# Patient Record
Sex: FEMALE | Race: BLACK OR AFRICAN AMERICAN | NOT HISPANIC OR LATINO | Employment: OTHER | URBAN - METROPOLITAN AREA
[De-identification: names, ages, dates, MRNs, and addresses within clinical notes are randomized per-mention and may not be internally consistent; named-entity substitution may affect disease eponyms.]

---

## 2017-01-06 ENCOUNTER — OFFICE VISIT (OUTPATIENT)
Dept: FAMILY MEDICINE | Facility: CLINIC | Age: 77
End: 2017-01-06

## 2017-01-06 VITALS
OXYGEN SATURATION: 97 % | DIASTOLIC BLOOD PRESSURE: 68 MMHG | HEART RATE: 84 BPM | BODY MASS INDEX: 25.52 KG/M2 | RESPIRATION RATE: 18 BRPM | WEIGHT: 135 LBS | SYSTOLIC BLOOD PRESSURE: 110 MMHG | TEMPERATURE: 98.1 F

## 2017-01-06 DIAGNOSIS — R42 DIZZINESS: Primary | ICD-10-CM

## 2017-01-06 DIAGNOSIS — M54.16 CHRONIC LUMBAR RADICULOPATHY: ICD-10-CM

## 2017-01-06 NOTE — PROGRESS NOTES
HPI:       Lou Winston is a 77 year old who presents for the following  Patient presents with:  Musculoskeletal Problem      Back Pain Follow Up       Description:   Location of pain:  Lumbar,  bilateral  Character of pain: sharp  Pain radiation: Does not radiate and radiates below the knee   Since last visit, pain is:  worsened  New numbness or weakness in legs, not attributed to pain:  Yes Details: lower extremity    Intensity: Currently 9/10,    History:   Pain interferes with job: Yes Details: cannot do anything at home.  Therapies tried without relief: muscle relaxants, NSAIDs, Physical Therapy and steroid injection  Therapies tried with relief: acetaminophen (Tylenol) 1000 mg TID     Accompanying Signs & Symptoms:  Risk of Fracture:  None  Risk of Cauda Equina:  None  Risk of Infection:  None  Risk of Cancer:  None             Dizziness: Report she has been dealing with dizziness for the past one month. Notices dizziness worse with standing and walking. Describe the dizziness as feeling blackout. Endorse palpitation. Not sure about the names of her medications. Per our medical records, patient is on minipress 6 mg at bedtime.     Problem, Medication and Allergy Lists were reviewed and are current.  Patient is an established patient of this clinic.         Review of Systems:   Review of Systems   Constitutional: Negative for fever and fatigue.   Respiratory: Negative for cough and shortness of breath.    Cardiovascular: Negative for chest pain, palpitations and leg swelling.   Musculoskeletal: Positive for back pain (chronic ).   Neurological: Positive for dizziness.             Physical Exam:   Patient Vitals for the past 24 hrs:   BP Temp Temp src Pulse Resp SpO2 Weight   01/06/17 1526 110/68 mmHg 98.1  F (36.7  C) Oral 84 18 97 % 135 lb (61.236 kg)     Body mass index is 25.52 kg/(m^2).  Vitals were reviewed and were normal     Physical Exam   Constitutional: She is oriented to person, place, and  time. She appears well-developed and well-nourished. No distress.   HENT:   Head: Normocephalic and atraumatic.   Cardiovascular: Normal rate, regular rhythm and normal heart sounds.    No murmur heard.  Pulmonary/Chest: Effort normal and breath sounds normal. No respiratory distress. She has no wheezes. She has no rales.   Musculoskeletal:        Lumbar back: She exhibits decreased range of motion and tenderness. She exhibits no bony tenderness, no swelling, no edema and no deformity.   Neurological: She is alert and oriented to person, place, and time.       Results:      Results from the last 24 hoursNo results found for this or any previous visit (from the past 24 hour(s)).  Assessment and Plan     Lou was seen today for musculoskeletal problem.    Diagnoses and all orders for this visit:    Dizziness: EKG showed normal sinus and normal orthostatic BP.   -Reviewed her medications list, but patient is does not know the names of medications she is taking. Discontinue prazosin and advised to schedule an appointment with one week for medication reconciliation and encouraged to bring all her medication pills with her to clinic   -Reassurance provided    -     EKG 12-lead complete w/read - Clinics  -     Orthostatic blood pressure and pulse    Chronic lumbar radiculopathy: Patient had MRI done in 2009 which showed disc herniation of L4-L5.  Failed multiple steroid injections and physical therapy. She was seen by surgeon and was told that she is not a good candidate for surgery.   -Continue scheduled tylenol 1000 mg TID   -Diclofenac cream   - Consider discontinue Zoloft and switching to Cymbalta since it will help with the pain. I am waiting until patient has an appointment with me or Pharm D for medication reconciliation   -     diclofenac (VOLTAREN) 1 % GEL topical gel; Apply 4 grams to lower back four times daily using enclosed dosing card.      Medications Discontinued During This Encounter   Medication Reason      prazosin (MINIPRESS) 2 MG capsule Stopped by Patient     diclofenac (VOLTAREN) 1 % GEL Reorder     Options for treatment and follow-up care were reviewed with the patient. Lou Winston  engaged in the decision making process and verbalized understanding of the options discussed and agreed with the final plan.    Tony Alarcon MD  PGY 3 General acute hospital  [phone](pg)

## 2017-01-06 NOTE — PROGRESS NOTES
Preceptor Attestation:   Patient seen and discussed with the resident. Reviewed EKG findings and agree with resident.  Assessment and plan reviewed with resident and agreed upon.   Supervising Physician:  Yonny Ibanez MD  [facility]

## 2017-01-06 NOTE — MR AVS SNAPSHOT
After Visit Summary   1/6/2017    Lou Winston    MRN: [id number]           Patient Information     Date Of Birth          1940        Visit Information        Provider Department      1/6/2017 3:20 PM Tony Alarcon MD [facility]        Today's Diagnoses     Chronic lumbar radiculopathy    -  1     Dizziness           Care Instructions    Here is the plan from today's visit    1. Chronic lumbar radiculopathy  -Schedule an appointment with either Dr. Alarcon or Pharm D for medication recon  -Continue scheduled tylenol and scheduled diclofenac cream   - diclofenac (VOLTAREN) 1 % GEL topical gel; Apply 4 grams to lower back four times daily using enclosed dosing card.  Dispense: 100 g; Refill: 11    2. Dizziness: Normal orthostatic BP pressure and EKG unremarkable  -Discontinue prazosin for now   - EKG 12-lead complete w/read - Clinics      Please call or return to clinic if your symptoms don't go away.    Follow up plan  Follow up in one week    Thank you for coming to [facility] today.  Lab Testing:  **If you had lab testing today and your results are reassuring or normal they will be mailed to you or sent through Raiing within 7 days.   **If the lab tests need quick action we will call you with the results.  The phone number we will call with results is # [phone] (home) . If this is not the best number please call our clinic and change the number.  Medication Refills:  If you need any refills please call your pharmacy and they will contact us.   If you need to  your refill at a new pharmacy, please contact the new pharmacy directly. The new pharmacy will help you get your medications transferred faster.   Scheduling:  If you have any concerns about today's visit or wish to schedule another appointment please call our office during normal business hours [phone] (8-5:00 M-F)  If a referral was made to a [facility] and you don't get  a call from central scheduling please call [phone].  If a Mammogram was ordered for you at [facility] call [phone] to schedule or change your appointment.  If you had an XRay/CT/Ultrasound/MRI ordered the number is [phone] to schedule or change your radiology appointment.   Medical Concerns:  If you have urgent medical concerns please call [phone] at any time of the day.  If you have a medical emergency please call [phone].              Follow-ups after your visit        Who to contact     Please call your clinic at [phone] to:    Ask questions about your health    Make or cancel appointments    Discuss your medicines    Learn about your test results    Speak to your doctor   If you have compliments or concerns about an experience at your clinic, or if you wish to file a complaint, please contact [facility] Patient Relations at [phone] or email us at [email or web address]         Additional Information About Your Visit        Sookbox[employer] Rocket Information     Halton is an electronic gateway that provides easy, online access to your medical records. With Halton, you can request a clinic appointment, read your test results, renew a prescription or communicate with your care team.     To sign up for Halton visit the website at [email or web address]   You will be asked to enter the access code listed below, as well as some personal information. Please follow the directions to create your username and password.     Your access code is: [email or web address]  Expires: 2017  4:14 PM     Your access code will  in 90 days. If you need help or a new code, please contact your [facility] or call [phone] for assistance.        Care EveryWhere ID     This is your Care EveryWhere ID. This could be used by other organizations to access your [place] medical records  [id number]        Your Vitals Were     Pulse Temperature  Respirations Pulse Oximetry Breastfeeding?       84 98.1  F (36.7  C) (Oral) 18 97% No        Blood Pressure from Last 3 Encounters:   01/06/17 110/68   07/21/16 117/77   06/24/16 116/79    Weight from Last 3 Encounters:   01/06/17 135 lb (61.236 kg)   07/21/16 131 lb 12.8 oz (59.784 kg)   06/24/16 136 lb 6.4 oz (61.871 kg)              We Performed the Following     EKG 12-lead complete w/read - Clinics     Orthostatic blood pressure and pulse          Where to get your medicines      These medications were sent to [facility] [place], [place] - [address]  [address] [facility] [address]     Phone:  [phone]    - diclofenac 1 % Gel topical gel       Primary Care Provider Office Phone # Fax #    Tony Alarcon [phone]       [facility] [address] [facility] [address]        Thank you!     Thank you for choosing [facility]  for your care. Our goal is always to provide you with excellent care. Hearing back from our patients is one way we can continue to improve our services. Please take a few minutes to complete the written survey that you may receive in the mail after your visit with us. Thank you!             Your Updated Medication List - Protect others around you: Learn how to safely use, store and throw away your medicines at www.disposemymeds.org.          This list is accurate as of: 1/6/17  4:14 PM.  Always use your most recent med list.                   Brand Name Dispense Instructions for use    * acetaminophen 650 MG CR tablet    TYLENOL    100 tablet    Take 1 tablet (650 mg) by mouth 3 times daily       * acetaminophen 500 MG tablet    TYLENOL    100 tablet    Take 2 tablets (1,000 mg) by mouth 3 times daily as needed for mild pain       * acetaminophen 500 MG tablet    TYLENOL    100 tablet    Take 2 tablets (1,000 mg) by mouth every 8 hours as needed for mild pain       alum & mag hydroxide-simethicone 200-200-20  MG/5ML Susp suspension    MYLANTA/MAALOX    1 Bottle    Take 30 mLs by mouth every 4 hours as needed       busPIRone 15 MG tablet    BUSPAR    270 tablet    Take 1 tablet (15 mg) by mouth 3 times daily       * calcium carbonate 500 MG chewable tablet    TUMS    150 tablet    Take 1 tablet (500 mg) by mouth 3 times daily as needed for heartburn       * calcium carbonate 500 MG chewable tablet    TUMS    120 tablet    Take 1 tablet (500 mg) by mouth daily       cyclobenzaprine 10 MG tablet    FLEXERIL    14 tablet    Take 1 tablet (10 mg) by mouth nightly as needed for muscle spasms       diclofenac 1 % Gel topical gel    VOLTAREN    100 g    Apply 4 grams to lower back four times daily using enclosed dosing card.       gabapentin 100 MG capsule    NEURONTIN    180 capsule    TAKE TWO CAPSULES BY MOUTH AT BEDTIME       pantoprazole 40 MG EC tablet    PROTONIX    30 tablet    TAKE ONE TABLET BY MOUTH EVERY DAY 30-60 MINUTES BEFORE A MEAL       senna-docusate 8.6-50 MG per tablet    SENOKOT-S;PERICOLACE    60 tablet    Take 1 tablet by mouth 2 times daily as needed for constipation       sertraline 100 MG tablet    ZOLOFT    180 tablet    Take 2 tablets (200 mg) by mouth daily       UNKNOWN TO PATIENT          * Notice:  This list has 5 medication(s) that are the same as other medications prescribed for you. Read the directions carefully, and ask your doctor or other care provider to review them with you.

## 2017-01-06 NOTE — PATIENT INSTRUCTIONS
Here is the plan from today's visit    1. Chronic lumbar radiculopathy  -Schedule an appointment with either Dr. Alarcon or Pharm D for medication recon  -Continue scheduled tylenol and scheduled diclofenac cream   - diclofenac (VOLTAREN) 1 % GEL topical gel; Apply 4 grams to lower back four times daily using enclosed dosing card.  Dispense: 100 g; Refill: 11    2. Dizziness: Normal orthostatic BP pressure and EKG unremarkable  -Discontinue prazosin for now   - EKG 12-lead complete w/read - Clinics      Please call or return to clinic if your symptoms don't go away.    Follow up plan  Follow up in one week    Thank you for coming to [facility] today.  Lab Testing:  **If you had lab testing today and your results are reassuring or normal they will be mailed to you or sent through EventMama within 7 days.   **If the lab tests need quick action we will call you with the results.  The phone number we will call with results is # [phone] (home) . If this is not the best number please call our clinic and change the number.  Medication Refills:  If you need any refills please call your pharmacy and they will contact us.   If you need to  your refill at a new pharmacy, please contact the new pharmacy directly. The new pharmacy will help you get your medications transferred faster.   Scheduling:  If you have any concerns about today's visit or wish to schedule another appointment please call our office during normal business hours [phone] (8-5:00 M-F)  If a referral was made to a [facility] and you don't get a call from central scheduling please call [phone].  If a Mammogram was ordered for you at [facility] call [phone] to schedule or change your appointment.  If you had an XRay/CT/Ultrasound/MRI ordered the number is [phone] to schedule or change your radiology appointment.   Medical Concerns:  If you have urgent medical concerns please call [phone] at any  time of the day.  If you have a medical emergency please call 911.

## 2017-01-15 ASSESSMENT — ENCOUNTER SYMPTOMS
SHORTNESS OF BREATH: 0
FATIGUE: 0
BACK PAIN: 1
FEVER: 0
DIZZINESS: 1
PALPITATIONS: 0
COUGH: 0

## 2017-01-17 ENCOUNTER — OFFICE VISIT (OUTPATIENT)
Dept: FAMILY MEDICINE | Facility: CLINIC | Age: 77
End: 2017-01-17

## 2017-01-17 ENCOUNTER — TELEPHONE (OUTPATIENT)
Dept: FAMILY MEDICINE | Facility: CLINIC | Age: 77
End: 2017-01-17

## 2017-01-17 VITALS
BODY MASS INDEX: 25.71 KG/M2 | HEART RATE: 79 BPM | RESPIRATION RATE: 14 BRPM | SYSTOLIC BLOOD PRESSURE: 121 MMHG | WEIGHT: 136 LBS | TEMPERATURE: 98.1 F | DIASTOLIC BLOOD PRESSURE: 74 MMHG | OXYGEN SATURATION: 100 %

## 2017-01-17 DIAGNOSIS — M54.42 CHRONIC BILATERAL LOW BACK PAIN WITH LEFT-SIDED SCIATICA: ICD-10-CM

## 2017-01-17 DIAGNOSIS — Z00.00 PREVENTATIVE HEALTH CARE: ICD-10-CM

## 2017-01-17 DIAGNOSIS — F33.41 RECURRENT MAJOR DEPRESSIVE DISORDER, IN PARTIAL REMISSION (H): Primary | ICD-10-CM

## 2017-01-17 DIAGNOSIS — G89.29 CHRONIC BILATERAL LOW BACK PAIN WITH LEFT-SIDED SCIATICA: ICD-10-CM

## 2017-01-17 DIAGNOSIS — H10.10 SEASONAL ALLERGIC CONJUNCTIVITIS: ICD-10-CM

## 2017-01-17 DIAGNOSIS — H04.123 DRY EYES: ICD-10-CM

## 2017-01-17 RX ORDER — CARBOXYMETHYLCELLULOSE SODIUM 5 MG/ML
1 SOLUTION/ DROPS OPHTHALMIC 3 TIMES DAILY PRN
Qty: 1 BOTTLE | Refills: 3 | Status: SHIPPED | OUTPATIENT
Start: 2017-01-17 | End: 2017-09-29

## 2017-01-17 RX ORDER — DULOXETIN HYDROCHLORIDE 30 MG/1
CAPSULE, DELAYED RELEASE ORAL
Qty: 180 CAPSULE | Refills: 3 | Status: SHIPPED | OUTPATIENT
Start: 2017-01-17 | End: 2017-01-18

## 2017-01-17 NOTE — MR AVS SNAPSHOT
After Visit Summary   1/17/2017    Lou Winston    MRN: [id number]           Patient Information     Date Of Birth          1940        Visit Information        Provider Department      1/17/2017 3:40 PM Tony Alarcon MD [facility]        Today's Diagnoses     Recurrent major depressive disorder, in partial remission (H)    -  1     Preventative health care         Chronic bilateral low back pain with left-sided sciatica         Seasonal allergic conjunctivitis         Dry eyes           Care Instructions    Here is the plan from today's visit    1. Recurrent major depressive disorder, in partial remission (H)  - DULoxetine (CYMBALTA) 30 MG EC capsule; Take 1 tablet daily for one week then take 2 tablets daily  Dispense: 180 capsule; Refill: 3    2. Preventative health care  - ADMIN VACCINE, INITIAL  - ADMIN VACCINE, EACH ADDITIONAL  - PNEUMOCOCCAL CONJ VACCINE 13 VALENT IM (PCV13)  - Flu vaccine, high dose    3. Chronic bilateral low back pain with left-sided sciatica  - DULoxetine (CYMBALTA) 30 MG EC capsule; Take 1 tablet daily for one week then take 2 tablets daily  Dispense: 180 capsule; Refill: 3    4. Seasonal allergic conjunctivitis  - ketotifen (KP KETOTIFEN FUMARATE) 0.025 % SOLN ophthalmic solution; Place 1 drop into both eyes 2 times daily  Dispense: 1 Bottle; Refill: 3    5. Dry eyes  - carboxymethylcellulose (REFRESH PLUS) 0.5 % SOLN ophthalmic solution; Place 1 drop into both eyes 3 times daily as needed for dry eyes  Dispense: 1 Bottle; Refill: 3      Please call or return to clinic if your symptoms don't go away.    Follow up plan  Follow up in 3 months     Thank you for coming to [facility] today.  Lab Testing:  **If you had lab testing today and your results are reassuring or normal they will be mailed to you or sent through Inviragen within 7 days.   **If the lab tests need quick action we will call you with the results.  The phone number we will  call with results is # [phone] (home) . If this is not the best number please call our clinic and change the number.  Medication Refills:  If you need any refills please call your pharmacy and they will contact us.   If you need to  your refill at a new pharmacy, please contact the new pharmacy directly. The new pharmacy will help you get your medications transferred faster.   Scheduling:  If you have any concerns about today's visit or wish to schedule another appointment please call our office during normal business hours [phone] (8-5:00 M-F)  If a referral was made to a [facility] and you don't get a call from central scheduling please call [phone].  If a Mammogram was ordered for you at [facility] call [phone] to schedule or change your appointment.  If you had an XRay/CT/Ultrasound/MRI ordered the number is [phone] to schedule or change your radiology appointment.   Medical Concerns:  If you have urgent medical concerns please call [phone] at any time of the day.  If you have a medical emergency please call [phone].        Follow-ups after your visit        Who to contact     Please call your clinic at [phone] to:    Ask questions about your health    Make or cancel appointments    Discuss your medicines    Learn about your test results    Speak to your doctor   If you have compliments or concerns about an experience at your clinic, or if you wish to file a complaint, please contact [facility] Patient Relations at [phone] or email us at [email or web address]         Additional Information About Your Visit        [employer] Information     [employer] is an electronic gateway that provides easy, online access to your medical records. With [employer], you can request a clinic appointment, read your test results, renew a prescription or communicate with your care team.     To sign up for [employer] visit the website  at [email or web address]   You will be asked to enter the access code listed below, as well as some personal information. Please follow the directions to create your username and password.     Your access code is: [email or web address]  Expires: 2017  4:14 PM     Your access code will  in 90 days. If you need help or a new code, please contact your [facility] or call [phone] for assistance.        Care EveryWhere ID     This is your Care EveryWhere ID. This could be used by other organizations to access your [place] medical records  [id number]        Your Vitals Were     Pulse Temperature Respirations Pulse Oximetry          79 98.1  F (36.7  C) (Oral) 14 100%         Blood Pressure from Last 3 Encounters:   17 121/74   17 110/68   16 117/77    Weight from Last 3 Encounters:   17 136 lb (61.689 kg)   17 135 lb (61.236 kg)   16 131 lb 12.8 oz (59.784 kg)              We Performed the Following     ADMIN VACCINE, EACH ADDITIONAL     ADMIN VACCINE, INITIAL     Flu vaccine, high dose     PNEUMOCOCCAL CONJ VACCINE 13 VALENT IM (PCV13)          Today's Medication Changes          These changes are accurate as of: 17  4:03 PM.  If you have any questions, ask your nurse or doctor.               Start taking these medicines.        Dose/Directions    carboxymethylcellulose 0.5 % Soln ophthalmic solution   Commonly known as:  REFRESH PLUS   Used for:  Dry eyes   Started by:  Tony Alarcon MD        Dose:  1 drop   Place 1 drop into both eyes 3 times daily as needed for dry eyes   Quantity:  1 Bottle   Refills:  3       DULoxetine 30 MG EC capsule   Commonly known as:  CYMBALTA   Used for:  Recurrent major depressive disorder, in partial remission (H), Chronic bilateral low back pain with left-sided sciatica   Started by:  Tony Alarcon MD        Take 1 tablet daily for one week then take 2 tablets daily   Quantity:  180 capsule    Refills:  3       ketotifen 0.025 % Soln ophthalmic solution   Commonly known as:  KP KETOTIFEN FUMARATE   Used for:  Seasonal allergic conjunctivitis   Started by:  Tony Alarcon MD        Dose:  1 drop   Place 1 drop into both eyes 2 times daily   Quantity:  1 Bottle   Refills:  3            Where to get your medicines      These medications were sent to [facility] [place], [place] - [address]  [address] [facility] [address]     Phone:  [phone]    - carboxymethylcellulose 0.5 % Soln ophthalmic solution  - DULoxetine 30 MG EC capsule  - ketotifen 0.025 % Soln ophthalmic solution             Primary Care Provider Office Phone # Fax #    Tony Alarcon [phone]       [facility] [address] [facility] [address]        Thank you!     Thank you for choosing [facility]  for your care. Our goal is always to provide you with excellent care. Hearing back from our patients is one way we can continue to improve our services. Please take a few minutes to complete the written survey that you may receive in the mail after your visit with us. Thank you!             Your Updated Medication List - Protect others around you: Learn how to safely use, store and throw away your medicines at www.disposemymeds.org.          This list is accurate as of: 1/17/17  4:03 PM.  Always use your most recent med list.                   Brand Name Dispense Instructions for use    acetaminophen 500 MG tablet    TYLENOL    100 tablet    Take 2 tablets (1,000 mg) by mouth every 8 hours as needed for mild pain       carboxymethylcellulose 0.5 % Soln ophthalmic solution    REFRESH PLUS    1 Bottle    Place 1 drop into both eyes 3 times daily as needed for dry eyes       diclofenac 1 % Gel topical gel    VOLTAREN    100 g    Apply 4 grams to lower back four times daily using enclosed dosing card.       DULoxetine 30 MG EC capsule    CYMBALTA    180 capsule    Take  1 tablet daily for one week then take 2 tablets daily       gabapentin 100 MG capsule    NEURONTIN    180 capsule    TAKE TWO CAPSULES BY MOUTH AT BEDTIME       ketotifen 0.025 % Soln ophthalmic solution    KP KETOTIFEN FUMARATE    1 Bottle    Place 1 drop into both eyes 2 times daily       pantoprazole 40 MG EC tablet    PROTONIX    30 tablet    TAKE ONE TABLET BY MOUTH EVERY DAY 30-60 MINUTES BEFORE A MEAL       UNKNOWN TO PATIENT

## 2017-01-17 NOTE — TELEPHONE ENCOUNTER
Insurance allows QTY# 34 per fill. Please submit for Prior Authorization       PRIOR AUTHORIZATION FOR   Drug: Duloxetine 30mg Capsules  Insurance: [employer]  ID Number: [id number]  BIN Number: [id number]  N Number: N/A  Group Number: [id number]  Phone Number: [phone]    Please notify pharmacy if Prior Auth is approved or denied    Thank You  Cha Franklin CPhT, [facility] Technician  [phone]  [email or web address]

## 2017-01-17 NOTE — PROGRESS NOTES
Preceptor Attestation:   Patient seen and discussed with the resident. Assessment and plan reviewed with resident and agreed upon.   Supervising Physician:  Tiffany Cabrera MD  [facility]

## 2017-01-17 NOTE — PATIENT INSTRUCTIONS
Here is the plan from today's visit    1. Recurrent major depressive disorder, in partial remission (H)  - DULoxetine (CYMBALTA) 30 MG EC capsule; Take 1 tablet daily for one week then take 2 tablets daily  Dispense: 180 capsule; Refill: 3    2. Preventative health care  - ADMIN VACCINE, INITIAL  - ADMIN VACCINE, EACH ADDITIONAL  - PNEUMOCOCCAL CONJ VACCINE 13 VALENT IM (PCV13)  - Flu vaccine, high dose    3. Chronic bilateral low back pain with left-sided sciatica  - DULoxetine (CYMBALTA) 30 MG EC capsule; Take 1 tablet daily for one week then take 2 tablets daily  Dispense: 180 capsule; Refill: 3    4. Seasonal allergic conjunctivitis  - ketotifen (KP KETOTIFEN FUMARATE) 0.025 % SOLN ophthalmic solution; Place 1 drop into both eyes 2 times daily  Dispense: 1 Bottle; Refill: 3    5. Dry eyes  - carboxymethylcellulose (REFRESH PLUS) 0.5 % SOLN ophthalmic solution; Place 1 drop into both eyes 3 times daily as needed for dry eyes  Dispense: 1 Bottle; Refill: 3      Please call or return to clinic if your symptoms don't go away.    Follow up plan  Follow up in 3 months     Thank you for coming to [facility] today.  Lab Testing:  **If you had lab testing today and your results are reassuring or normal they will be mailed to you or sent through [employer] within 7 days.   **If the lab tests need quick action we will call you with the results.  The phone number we will call with results is # [phone] (home) . If this is not the best number please call our clinic and change the number.  Medication Refills:  If you need any refills please call your pharmacy and they will contact us.   If you need to  your refill at a new pharmacy, please contact the new pharmacy directly. The new pharmacy will help you get your medications transferred faster.   Scheduling:  If you have any concerns about today's visit or wish to schedule another appointment please call our office during normal business hours [phone]  (8-5:00 M-F)  If a referral was made to a [facility] and you don't get a call from central scheduling please call [phone].  If a Mammogram was ordered for you at [facility] call [phone] to schedule or change your appointment.  If you had an XRay/CT/Ultrasound/MRI ordered the number is [phone] to schedule or change your radiology appointment.   Medical Concerns:  If you have urgent medical concerns please call [phone] at any time of the day.  If you have a medical emergency please call [phone].

## 2017-01-17 NOTE — PROGRESS NOTES
HPI:       Lou Winston is a 77 year old who presents for the following  Patient presents with:  Leg Pain: right leg   Recheck Medication: pt would like to go over medication   Refill Request: eye drops   Imm/Inj: PCV13 and flu    Medication reconcillation: Patient is here to go over her medication list. She brought her empty bottle pills and medications pills. Reports she stopped taking prazosin and zoloft one month ago. States she notices her acid reflux worse when she takes Zoloft. Minipress caused her to be lightheaded. She only takes scheduled tylenol 1000 mg TID, gabapentin 200 mg at bedtime and protonix once daily.      Problem, Medication and Allergy Lists were reviewed and are current.  Patient is an established patient of this clinic.         Review of Systems:   Review of Systems   Constitutional: Negative for fever and fatigue.   Eyes: Positive for discharge and itching.   Respiratory: Negative for cough and shortness of breath.    Cardiovascular: Negative for chest pain, palpitations and leg swelling.   Musculoskeletal: Positive for back pain.   Neurological: Negative for light-headedness.   Psychiatric/Behavioral: Positive for dysphoric mood. Negative for suicidal ideas, sleep disturbance and self-injury.             Physical Exam:   Patient Vitals for the past 24 hrs:   BP Temp Temp src Pulse Resp SpO2 Weight   01/17/17 1541 121/74 mmHg 98.1  F (36.7  C) Oral 79 14 100 % 136 lb (61.689 kg)     Body mass index is 25.71 kg/(m^2).  Vitals were reviewed and were normal     Physical Exam   Constitutional: She is oriented to person, place, and time. She appears well-developed and well-nourished. No distress.   HENT:   Head: Normocephalic and atraumatic.   Eyes: Pupils are equal, round, and reactive to light. Right eye exhibits discharge. Left eye exhibits discharge. Right conjunctiva is injected. Left conjunctiva is injected.   Cardiovascular: Normal rate, regular rhythm and normal heart sounds.     No murmur heard.  Pulmonary/Chest: Effort normal. No respiratory distress. She has no wheezes. She has no rales.   Neurological: She is alert and oriented to person, place, and time.   Psychiatric: She has a normal mood and affect. Her speech is normal and behavior is normal. Thought content normal. Cognition and memory are normal.         Results:      Results from the last 24 hoursNo results found for this or any previous visit (from the past 24 hour(s)).  Assessment and Plan     Lou was seen today for leg pain, recheck medication, refill request and imm/inj.    Diagnoses and all orders for this visit:    Recurrent major depressive disorder, in partial remission (H)  -Discontinued Zoloft on her own one month ago and added Duloxetine to help with depression and chronic back pain   -     DULoxetine (CYMBALTA) 30 MG EC capsule; Take 1 tablet daily for one week then take 2 tablets daily  -Follow up in 3 months     Chronic bilateral low back pain with left-sided sciatica:   -Continue scheduled tylenol 1000mg TID and diclofenac 1% gel QID prn   -Added Cymbalta to help with depression and pain   -Gabapentin 200 mg at bedtime to help with neuropathy pain   -     DULoxetine (CYMBALTA) 30 MG EC capsule; Take 1 tablet daily for one week then take 2 tablets daily    Seasonal allergic conjunctivitis  -     ketotifen ( KETOTIFEN FUMARATE) 0.025 % SOLN ophthalmic solution; Place 1 drop into both eyes 2 times daily    Dry eyes  -     carboxymethylcellulose (REFRESH PLUS) 0.5 % SOLN ophthalmic solution; Place 1 drop into both eyes 3 times daily as needed for dry eyes    Preventative health care  -     ADMIN VACCINE, INITIAL  -     ADMIN VACCINE, EACH ADDITIONAL  -     PNEUMOCOCCAL CONJ VACCINE 13 VALENT IM (PCV13)  -     Flu vaccine, high dose      Medications Discontinued During This Encounter   Medication Reason     calcium carbonate (TUMS) 500 MG chewable tablet Stopped by Patient     alum & mag hydroxide-simethicone  (MYLANTA/MAALOX) 200-200-20 MG/5ML SUSP Stopped by Patient     acetaminophen (TYLENOL) 650 MG CR tablet Stopped by Patient     cyclobenzaprine (FLEXERIL) 10 MG tablet Stopped by Patient     calcium carbonate (TUMS) 500 MG chewable tablet Stopped by Patient     sertraline (ZOLOFT) 100 MG tablet Stopped by Patient     acetaminophen (TYLENOL) 500 MG tablet Stopped by Patient     busPIRone (BUSPAR) 15 MG tablet Stopped by Patient     senna-docusate (SENOKOT-S;PERICOLACE) 8.6-50 MG per tablet Stopped by Patient     Options for treatment and follow-up care were reviewed with the patient. Lou Winston  engaged in the decision making process and verbalized understanding of the options discussed and agreed with the final plan.    Tony Alarcon MD  PGY 3 [facility]  [phone](pg)

## 2017-01-18 RX ORDER — DULOXETIN HYDROCHLORIDE 60 MG/1
CAPSULE, DELAYED RELEASE ORAL
Qty: 120 CAPSULE | Refills: 3 | Status: SHIPPED | OUTPATIENT
Start: 2017-01-18 | End: 2017-04-17

## 2017-01-18 ASSESSMENT — PATIENT HEALTH QUESTIONNAIRE - PHQ9: SUM OF ALL RESPONSES TO PHQ QUESTIONS 1-9: 10

## 2017-01-24 ASSESSMENT — ENCOUNTER SYMPTOMS
EYE DISCHARGE: 1
LIGHT-HEADEDNESS: 0
SLEEP DISTURBANCE: 0
COUGH: 0
DYSPHORIC MOOD: 1
EYE ITCHING: 1
SHORTNESS OF BREATH: 0
PALPITATIONS: 0
FATIGUE: 0
FEVER: 0
BACK PAIN: 1

## 2017-03-21 DIAGNOSIS — M54.16 CHRONIC LUMBAR RADICULOPATHY: ICD-10-CM

## 2017-03-21 NOTE — TELEPHONE ENCOUNTER
Refill Request:     Drug:Tylenol 500mg  Last Fill Date: 11/22/2016  Last Fill Quantity: 100  Last Office Visit: 01/17/17    Thank you,  Deepika Warren, PharmD  [facility]

## 2017-03-22 ENCOUNTER — TELEPHONE (OUTPATIENT)
Dept: FAMILY MEDICINE | Facility: CLINIC | Age: 77
End: 2017-03-22

## 2017-03-22 DIAGNOSIS — M54.16 CHRONIC LUMBAR RADICULOPATHY: ICD-10-CM

## 2017-03-22 RX ORDER — ACETAMINOPHEN 500 MG
1000 TABLET ORAL EVERY 8 HOURS PRN
Qty: 100 TABLET | Refills: 0 | Status: SHIPPED | OUTPATIENT
Start: 2017-03-22 | End: 2017-03-22

## 2017-03-22 RX ORDER — ACETAMINOPHEN 500 MG
1000 TABLET ORAL EVERY 8 HOURS PRN
Qty: 100 TABLET | Refills: 0 | Status: SHIPPED | OUTPATIENT
Start: 2017-03-22 | End: 2017-05-15

## 2017-04-10 DIAGNOSIS — Z78.9 PATIENT TRAVELS: Primary | ICD-10-CM

## 2017-04-17 ENCOUNTER — OFFICE VISIT (OUTPATIENT)
Dept: FAMILY MEDICINE | Facility: CLINIC | Age: 77
End: 2017-04-17

## 2017-04-17 VITALS
BODY MASS INDEX: 26.87 KG/M2 | WEIGHT: 142.2 LBS | HEART RATE: 83 BPM | TEMPERATURE: 98.3 F | RESPIRATION RATE: 18 BRPM | SYSTOLIC BLOOD PRESSURE: 123 MMHG | OXYGEN SATURATION: 96 % | DIASTOLIC BLOOD PRESSURE: 78 MMHG

## 2017-04-17 DIAGNOSIS — G89.29 CHRONIC BILATERAL LOW BACK PAIN WITH LEFT-SIDED SCIATICA: ICD-10-CM

## 2017-04-17 DIAGNOSIS — M79.7 FIBROMYALGIA: Primary | ICD-10-CM

## 2017-04-17 DIAGNOSIS — F33.41 RECURRENT MAJOR DEPRESSIVE DISORDER, IN PARTIAL REMISSION (H): ICD-10-CM

## 2017-04-17 DIAGNOSIS — M54.42 CHRONIC BILATERAL LOW BACK PAIN WITH LEFT-SIDED SCIATICA: ICD-10-CM

## 2017-04-17 RX ORDER — DULOXETIN HYDROCHLORIDE 60 MG/1
CAPSULE, DELAYED RELEASE ORAL
Qty: 120 CAPSULE | Refills: 0 | Status: SHIPPED | OUTPATIENT
Start: 2017-04-17 | End: 2017-05-01

## 2017-04-17 NOTE — PATIENT INSTRUCTIONS
Here is the plan from today's visit    1. Recurrent major depressive disorder, in partial remission (H)-  -Will resume Cymbalta today. Take 1 tablet daily for one week then increase to 1 tablet twice daily   - DULoxetine (CYMBALTA) 60 MG EC capsule; Take 1 tablet daily then increase to 1 tablet BID  Dispense: 120 capsule; Refill: 0    2. Chronic bilateral low back pain with left-sided sciatica  - DULoxetine (CYMBALTA) 60 MG EC capsule; Take 1 tablet daily then increase to 1 tablet BID  Dispense: 120 capsule; Refill: 0        Please call or return to clinic if your symptoms don't go away.    Follow up plan  Follow up in 3 weeks    Thank you for coming to [facility] today.  Lab Testing:  **If you had lab testing today and your results are reassuring or normal they will be mailed to you or sent through [employer] within 7 days.   **If the lab tests need quick action we will call you with the results.  The phone number we will call with results is # [phone] (home) . If this is not the best number please call our clinic and change the number.  Medication Refills:  If you need any refills please call your pharmacy and they will contact us.   If you need to  your refill at a new pharmacy, please contact the new pharmacy directly. The new pharmacy will help you get your medications transferred faster.   Scheduling:  If you have any concerns about today's visit or wish to schedule another appointment please call our office during normal business hours [phone] (8-5:00 M-F)  If a referral was made to a [facility] and you don't get a call from central scheduling please call [phone].  If a Mammogram was ordered for you at [facility] call [phone] to schedule or change your appointment.  If you had an XRay/CT/Ultrasound/MRI ordered the number is [phone] to schedule or change your radiology appointment.   Medical Concerns:  If you have urgent medical concerns please  call [phone] at any time of the day.  If you have a medical emergency please call [phone].

## 2017-04-17 NOTE — PROGRESS NOTES
HPI:       Lou Winston is a 77 year old who presents for the following  Patient presents with:  Pain: all over body. muscle and joint pain.    Fibromyalgia: Report she has been having pain all over her body and her muscle for the past 2 weeks. She has been out of Cymbalta for the past 2 weeks. States the Cymbalta helped with the body pain. Notices fatigue, sleepiness and tiredness.     Chronic lumbar radiculopathy: Reports her back pain is better. She takes tylenol 1000 mg TID and diclofenac cream. She also feels the duloxetine helps with her back pain.       Major depression: Report her mood has been good except the past several weeks. She has been feeling more tired, fatigue and tired. Sleeps all day        Problem, Medication and Allergy Lists were reviewed and are current.  Patient is an established patient of this clinic.         Review of Systems:   Review of Systems   Constitutional: Positive for fatigue. Negative for diaphoresis and fever.   Respiratory: Negative for cough and shortness of breath.    Cardiovascular: Negative for chest pain, palpitations and leg swelling.   Musculoskeletal: Positive for back pain and myalgias.   Psychiatric/Behavioral: Positive for dysphoric mood and sleep disturbance. Negative for suicidal ideas.             Physical Exam:   Patient Vitals for the past 24 hrs:   BP Temp Temp src Pulse Resp SpO2 Weight   04/17/17 1414 123/78 98.3  F (36.8  C) Oral 83 18 96 % 142 lb 3.2 oz (64.5 kg)     Body mass index is 26.87 kg/(m^2).  Vitals were reviewed and were normal     Physical Exam   Constitutional: She is oriented to person, place, and time. She appears well-developed and well-nourished. No distress.   HENT:   Head: Normocephalic and atraumatic.   Cardiovascular: Normal rate, regular rhythm and normal heart sounds.    No murmur heard.  Pulmonary/Chest: Effort normal and breath sounds normal. No respiratory distress. She has no wheezes. She has no rales.   Neurological:  She is alert and oriented to person, place, and time.   Psychiatric: Her speech is normal. Judgment and thought content normal. She is withdrawn. Cognition and memory are normal. She exhibits a depressed mood.       Results:      Results from the last 24 hoursNo results found for this or any previous visit (from the past 24 hour(s)).  Assessment and Plan     Patient Instructions   Here is the plan from today's visit    1. Recurrent major depressive disorder, in partial remission (H)-She had PHQ-9 score of 12 today. Discussed with the patient that her symptoms could be due to abrupt discontinuation of Duloxetine. Since her duloxetine helps with her depression, back pain and fibromyalgia.   -Will resume Cymbalta today. Take 1 tablet daily for one week then increase to 1 tablet twice daily  -Follow up in 3 weeks    - DULoxetine (CYMBALTA) 60 MG EC capsule; Take 1 tablet daily then increase to 1 tablet BID  Dispense: 120 capsule; Refill: 0    2. Chronic bilateral low back pain with left-sided sciatica  - DULoxetine (CYMBALTA) 60 MG EC capsule; Take 1 tablet daily then increase to 1 tablet BID  Dispense: 120 capsule; Refill: 0      Please call or return to clinic if your symptoms don't go away.    Follow up plan  Follow up in 3 weeks    Thank you for coming to [facility] today.  .  Medications Discontinued During This Encounter   Medication Reason     DULoxetine (CYMBALTA) 60 MG EC capsule Reorder     Options for treatment and follow-up care were reviewed with the patient. Lou Winston  engaged in the decision making process and verbalized understanding of the options discussed and agreed with the final plan.    Tony Alarcon MD  PGY 3 [facility]  [phone](pg)

## 2017-04-17 NOTE — PROGRESS NOTES
Preceptor Attestation:   Patient seen and discussed with the resident. Assessment and plan reviewed with resident and agreed upon.   Supervising Physician:  Yonny Ibanez MD  [facility]

## 2017-04-17 NOTE — NURSING NOTE
name: Irageorge Wray  Language: Oromo  Agency: [employer]  Phone number: [phone]    phq9 form given to  to help pt fill out

## 2017-04-17 NOTE — MR AVS SNAPSHOT
After Visit Summary   4/17/2017    Lou Winston    MRN: [id number]           Patient Information     Date Of Birth          1940        Visit Information        Provider Department      4/17/2017 2:00 PM Tony Alarcon MD [facility]        Today's Diagnoses     Fibromyalgia    -  1    Recurrent major depressive disorder, in partial remission (H)        Chronic bilateral low back pain with left-sided sciatica          Care Instructions    Here is the plan from today's visit    1. Recurrent major depressive disorder, in partial remission (H)-  -Will resume Cymbalta today. Take 1 tablet daily for one week then increase to 1 tablet twice daily   - DULoxetine (CYMBALTA) 60 MG EC capsule; Take 1 tablet daily then increase to 1 tablet BID  Dispense: 120 capsule; Refill: 0    2. Chronic bilateral low back pain with left-sided sciatica  - DULoxetine (CYMBALTA) 60 MG EC capsule; Take 1 tablet daily then increase to 1 tablet BID  Dispense: 120 capsule; Refill: 0        Please call or return to clinic if your symptoms don't go away.    Follow up plan  Follow up in 3 weeks    Thank you for coming to [facility] today.  Lab Testing:  **If you had lab testing today and your results are reassuring or normal they will be mailed to you or sent through [employer] within 7 days.   **If the lab tests need quick action we will call you with the results.  The phone number we will call with results is # [phone] (home) . If this is not the best number please call our clinic and change the number.  Medication Refills:  If you need any refills please call your pharmacy and they will contact us.   If you need to  your refill at a new pharmacy, please contact the new pharmacy directly. The new pharmacy will help you get your medications transferred faster.   Scheduling:  If you have any concerns about today's visit or wish to schedule another appointment please call our office during  normal business hours [phone] (8-5:00 M-F)  If a referral was made to a [facility] and you don't get a call from central scheduling please call [phone].  If a Mammogram was ordered for you at [facility] call [phone] to schedule or change your appointment.  If you had an XRay/CT/Ultrasound/MRI ordered the number is [phone] to schedule or change your radiology appointment.   Medical Concerns:  If you have urgent medical concerns please call [phone] at any time of the day.  If you have a medical emergency please call [phone].        Follow-ups after your visit        Who to contact     Please call your clinic at [phone] to:    Ask questions about your health    Make or cancel appointments    Discuss your medicines    Learn about your test results    Speak to your doctor   If you have compliments or concerns about an experience at your clinic, or if you wish to file a complaint, please contact [facility] Patient Relations at [phone] or email us at [email or web address]         Additional Information About Your Visit        [employer] Information     [employer] is an electronic gateway that provides easy, online access to your medical records. With [employer], you can request a clinic appointment, read your test results, renew a prescription or communicate with your care team.     To sign up for [employer] visit the website at [email or web address]   You will be asked to enter the access code listed below, as well as some personal information. Please follow the directions to create your username and password.     Your access code is: [email or web address]  Expires: 2017  2:44 PM     Your access code will  in 90 days. If you need help or a new code, please contact your [facility] or call [phone] for assistance.        Care EveryWhere ID     This is your Care EveryWhere ID. This could be used  by other organizations to access your [facility] medical records  [id number]        Your Vitals Were     Pulse Temperature Respirations Pulse Oximetry BMI (Body Mass Index)       83 98.3  F (36.8  C) (Oral) 18 96% 26.87 kg/m2        Blood Pressure from Last 3 Encounters:   04/17/17 123/78   01/17/17 121/74   01/06/17 110/68    Weight from Last 3 Encounters:   04/17/17 142 lb 3.2 oz (64.5 kg)   01/17/17 136 lb (61.7 kg)   01/06/17 135 lb (61.2 kg)              Today, you had the following     No orders found for display         Today's Medication Changes          These changes are accurate as of: 4/17/17  2:45 PM.  If you have any questions, ask your nurse or doctor.               These medicines have changed or have updated prescriptions.        Dose/Directions    DULoxetine 60 MG EC capsule   Commonly known as:  CYMBALTA   This may have changed:  additional instructions   Used for:  Recurrent major depressive disorder, in partial remission (H), Chronic bilateral low back pain with left-sided sciatica   Changed by:  Tony Alarcon MD        Take 1 tablet daily then increase to 1 tablet BID   Quantity:  120 capsule   Refills:  0            Where to get your medicines      These medications were sent to [facility] [place], [place] - [address] [facility] [address]     Phone:  [phone]     DULoxetine 60 MG EC capsule                Primary Care Provider Office Phone # Fax #    Tony Alarcon [phone]       [facility] [address] [place] [id number]        Thank you!     Thank you for choosing [facility]  for your care. Our goal is always to provide you with excellent care. Hearing back from our patients is one way we can continue to improve our services. Please take a few minutes to complete the written survey that you may receive in the mail after your visit with us. Thank you!             Your Updated Medication List -  Protect others around you: Learn how to safely use, store and throw away your medicines at www.disposemymeds.org.          This list is accurate as of: 4/17/17  2:45 PM.  Always use your most recent med list.                   Brand Name Dispense Instructions for use    acetaminophen 500 MG tablet    TYLENOL    100 tablet    Take 2 tablets (1,000 mg) by mouth every 8 hours as needed for mild pain       carboxymethylcellulose 0.5 % Soln ophthalmic solution    REFRESH PLUS    1 Bottle    Place 1 drop into both eyes 3 times daily as needed for dry eyes       diclofenac 1 % Gel topical gel    VOLTAREN    100 g    Apply 4 grams to lower back four times daily using enclosed dosing card.       DULoxetine 60 MG EC capsule    CYMBALTA    120 capsule    Take 1 tablet daily then increase to 1 tablet BID       gabapentin 100 MG capsule    NEURONTIN    180 capsule    TAKE TWO CAPSULES BY MOUTH AT BEDTIME       ketotifen 0.025 % Soln ophthalmic solution    KP KETOTIFEN FUMARATE    1 Bottle    Place 1 drop into both eyes 2 times daily       pantoprazole 40 MG EC tablet    PROTONIX    30 tablet    TAKE ONE TABLET BY MOUTH EVERY DAY 30-60 MINUTES BEFORE A MEAL       UNKNOWN TO PATIENT

## 2017-04-19 PROBLEM — M79.7 FIBROMYALGIA: Status: ACTIVE | Noted: 2017-04-19

## 2017-04-19 ASSESSMENT — ENCOUNTER SYMPTOMS
SHORTNESS OF BREATH: 0
DYSPHORIC MOOD: 1
FEVER: 0
PALPITATIONS: 0
COUGH: 0
BACK PAIN: 1
SLEEP DISTURBANCE: 1
FATIGUE: 1
MYALGIAS: 1
DIAPHORESIS: 0

## 2017-05-01 ENCOUNTER — OFFICE VISIT (OUTPATIENT)
Dept: FAMILY MEDICINE | Facility: CLINIC | Age: 77
End: 2017-05-01

## 2017-05-01 VITALS
TEMPERATURE: 98.3 F | SYSTOLIC BLOOD PRESSURE: 110 MMHG | WEIGHT: 141 LBS | DIASTOLIC BLOOD PRESSURE: 74 MMHG | HEART RATE: 80 BPM | BODY MASS INDEX: 26.64 KG/M2 | RESPIRATION RATE: 18 BRPM | OXYGEN SATURATION: 100 %

## 2017-05-01 DIAGNOSIS — M54.16 CHRONIC LUMBAR RADICULOPATHY: ICD-10-CM

## 2017-05-01 DIAGNOSIS — F33.1 MAJOR DEPRESSIVE DISORDER, RECURRENT EPISODE, MODERATE (H): Primary | ICD-10-CM

## 2017-05-01 PROBLEM — M79.7 FIBROMYALGIA: Status: RESOLVED | Noted: 2017-04-19 | Resolved: 2017-05-01

## 2017-05-01 RX ORDER — DULOXETIN HYDROCHLORIDE 60 MG/1
60 CAPSULE, DELAYED RELEASE ORAL 2 TIMES DAILY
Qty: 120 CAPSULE | Refills: 3 | Status: SHIPPED | OUTPATIENT
Start: 2017-05-01 | End: 2017-05-15

## 2017-05-01 ASSESSMENT — ENCOUNTER SYMPTOMS
COUGH: 0
BACK PAIN: 1
PALPITATIONS: 0
FATIGUE: 0
FEVER: 0
SHORTNESS OF BREATH: 0

## 2017-05-01 NOTE — PROGRESS NOTES
HPI:       Lou Winston is a 77 year old who presents for the following  Patient presents with:  Generalized Body Aches    Chronic back pain/Fibromyalgia/MDD follow-up: Patient was seen on 4/17 and she was out her Cymbalta last time. We restarted her duloxetine and now doing much better. Reports her pain and mood is better. She is leaving on May 15th, 2017 and going to [facility]. She is able to do activities and house chores.  States she is feeling very good    Problem, Medication and Allergy Lists were reviewed and are current.  Patient is an established patient of this clinic.         Review of Systems:   Review of Systems   Constitutional: Negative for fatigue and fever.   Respiratory: Negative for cough and shortness of breath.    Cardiovascular: Negative for chest pain, palpitations and leg swelling.   Musculoskeletal: Positive for back pain.             Physical Exam:   Patient Vitals for the past 24 hrs:   BP Temp Temp src Pulse Resp SpO2 Weight   05/01/17 1358 110/74 98.3  F (36.8  C) Oral 80 18 100 % 141 lb (64 kg)     Body mass index is 26.64 kg/(m^2).  Vitals were reviewed and were normal     Physical Exam   Constitutional: She is oriented to person, place, and time. She appears well-developed and well-nourished. No distress.   HENT:   Head: Normocephalic and atraumatic.   Cardiovascular: Normal rate, regular rhythm and normal heart sounds.    No murmur heard.  Pulmonary/Chest: Effort normal and breath sounds normal. No respiratory distress. She has no wheezes. She has no rales.   Neurological: She is alert and oriented to person, place, and time.   Psychiatric: Her speech is normal and behavior is normal. Judgment and thought content normal. Cognition and memory are normal.   Mood and affect: Happy and engaging.        Results:      Results from the last 24 hoursNo results found for this or any previous visit (from the past 24 hour(s)).  Assessment and Plan     Lou was seen today for  generalized body aches.    Diagnoses and all orders for this visit:    Major depressive disorder, recurrent episode, moderate (H)  Chronic lumbar radiculopathy: Scored 6 on PHQ-9 today. Patient looks happy and engaging.   -Encouraged continue Cymbalta 60 BID. Discussed the withdrawal symptoms with abrupt cessation with Cymbalta. Advised to  her medications three days prior to her emptying her pill box  -Follow up after she returns from [place].   -     DULoxetine (CYMBALTA) 60 MG EC capsule; Take 1 capsule (60 mg) by mouth 2 times daily      Medications Discontinued During This Encounter   Medication Reason     DULoxetine (CYMBALTA) 60 MG EC capsule Reorder     Options for treatment and follow-up care were reviewed with the patient. Lou Winston  engaged in the decision making process and verbalized understanding of the options discussed and agreed with the final plan.    Tony Alarcon MD  PGY 3 [facility]  [phone](pg)

## 2017-05-01 NOTE — MR AVS SNAPSHOT
After Visit Summary   5/1/2017    Lou Winston    MRN: [id number]           Patient Information     Date Of Birth          1940        Visit Information        Provider Department      5/1/2017 1:20 PM Tony Alarcon MD [facility]        Today's Diagnoses     Major depressive disorder, recurrent episode, moderate (H)    -  1    Chronic lumbar radiculopathy        Fibromyalgia        Recurrent major depressive disorder, in partial remission (H)        Chronic bilateral low back pain with left-sided sciatica          Care Instructions    Recurrent major depressive disorder, in partial remission (H)  Chronic lumbar radiculopathy  Fibromyalgia  -Continue Duloxetine   -Safe travel and follow up after she returns to [facility]     - DULoxetine (CYMBALTA) 60 MG EC capsule; Take 1 capsule (60 mg) by mouth 2 times daily  Dispense: 120 capsule; Refill: 3            Follow-ups after your visit        Who to contact     Please call your clinic at [phone] to:    Ask questions about your health    Make or cancel appointments    Discuss your medicines    Learn about your test results    Speak to your doctor   If you have compliments or concerns about an experience at your clinic, or if you wish to file a complaint, please contact [facility] Patient Relations at [phone] or email us at [email or web address]         Additional Information About Your Visit        MyChart Information     Tunaspot is an electronic gateway that provides easy, online access to your medical records. With Tunaspot, you can request a clinic appointment, read your test results, renew a prescription or communicate with your care team.     To sign up for [employer] visit the website at [email or web address]   You will be asked to enter the access code listed below, as well as some personal information. Please follow the directions to create your username and password.      Your access code is: [email or web address]  Expires: 2017  2:44 PM     Your access code will  in 90 days. If you need help or a new code, please contact your [facility] or call [phone] for assistance.        Care EveryWhere ID     This is your Care EveryWhere ID. This could be used by other organizations to access your [facility] medical records  [id number]        Your Vitals Were     Pulse Temperature Respirations Pulse Oximetry Breastfeeding? BMI (Body Mass Index)    80 98.3  F (36.8  C) (Oral) 18 100% No 26.64 kg/m2       Blood Pressure from Last 3 Encounters:   17 110/74   17 123/78   17 121/74    Weight from Last 3 Encounters:   17 141 lb (64 kg)   17 142 lb 3.2 oz (64.5 kg)   17 136 lb (61.7 kg)              Today, you had the following     No orders found for display         Today's Medication Changes          These changes are accurate as of: 17  2:21 PM.  If you have any questions, ask your nurse or doctor.               These medicines have changed or have updated prescriptions.        Dose/Directions    DULoxetine 60 MG EC capsule   Commonly known as:  CYMBALTA   This may have changed:    - how much to take  - how to take this  - when to take this  - additional instructions   Used for:  Recurrent major depressive disorder, in partial remission (H), Chronic bilateral low back pain with left-sided sciatica   Changed by:  Tony Alracon MD        Dose:  60 mg   Take 1 capsule (60 mg) by mouth 2 times daily   Quantity:  120 capsule   Refills:  3            Where to get your medicines      These medications were sent to [facility] [place], [place] - [address] [facility] [address]     Phone:  [phone]     DULoxetine 60 MG EC capsule                Primary Care Provider Office Phone # Fax #    Tony Alarcon [phone]       [facility] [address]  [place]  [place] [address]        Thank you!     Thank you for choosing [facility]  for your care. Our goal is always to provide you with excellent care. Hearing back from our patients is one way we can continue to improve our services. Please take a few minutes to complete the written survey that you may receive in the mail after your visit with us. Thank you!             Your Updated Medication List - Protect others around you: Learn how to safely use, store and throw away your medicines at www.disposemymeds.org.          This list is accurate as of: 5/1/17  2:21 PM.  Always use your most recent med list.                   Brand Name Dispense Instructions for use    acetaminophen 500 MG tablet    TYLENOL    100 tablet    Take 2 tablets (1,000 mg) by mouth every 8 hours as needed for mild pain       carboxymethylcellulose 0.5 % Soln ophthalmic solution    REFRESH PLUS    1 Bottle    Place 1 drop into both eyes 3 times daily as needed for dry eyes       diclofenac 1 % Gel topical gel    VOLTAREN    100 g    Apply 4 grams to lower back four times daily using enclosed dosing card.       DULoxetine 60 MG EC capsule    CYMBALTA    120 capsule    Take 1 capsule (60 mg) by mouth 2 times daily       gabapentin 100 MG capsule    NEURONTIN    180 capsule    TAKE TWO CAPSULES BY MOUTH AT BEDTIME       ketotifen 0.025 % Soln ophthalmic solution    KP KETOTIFEN FUMARATE    1 Bottle    Place 1 drop into both eyes 2 times daily       pantoprazole 40 MG EC tablet    PROTONIX    30 tablet    TAKE ONE TABLET BY MOUTH EVERY DAY 30-60 MINUTES BEFORE A MEAL       UNKNOWN TO PATIENT

## 2017-05-01 NOTE — PROGRESS NOTES
Preceptor Attestation:   Patient seen and discussed with the resident. Assessment and plan reviewed with resident and agreed upon.   Supervising Physician:  Yonny Ibanez MD  [facility]

## 2017-05-01 NOTE — PATIENT INSTRUCTIONS
Recurrent major depressive disorder, in partial remission (H)  Chronic lumbar radiculopathy  Fibromyalgia  -Continue Duloxetine   -Safe travel and follow up after she returns to [facility]     - DULoxetine (CYMBALTA) 60 MG EC capsule; Take 1 capsule (60 mg) by mouth 2 times daily  Dispense: 120 capsule; Refill: 3

## 2017-05-02 ASSESSMENT — PATIENT HEALTH QUESTIONNAIRE - PHQ9: SUM OF ALL RESPONSES TO PHQ QUESTIONS 1-9: 6

## 2017-05-11 ENCOUNTER — OFFICE VISIT (OUTPATIENT)
Dept: FAMILY MEDICINE | Facility: CLINIC | Age: 77
End: 2017-05-11
Payer: COMMERCIAL

## 2017-05-11 VITALS — HEART RATE: 69 BPM | TEMPERATURE: 97.5 F | DIASTOLIC BLOOD PRESSURE: 77 MMHG | SYSTOLIC BLOOD PRESSURE: 122 MMHG

## 2017-05-11 DIAGNOSIS — Z71.84 TRAVEL ADVICE ENCOUNTER: Primary | ICD-10-CM

## 2017-05-11 DIAGNOSIS — Z23 NEED FOR VACCINATION: ICD-10-CM

## 2017-05-11 PROCEDURE — 90471 IMMUNIZATION ADMIN: CPT | Performed by: NURSE PRACTITIONER

## 2017-05-11 PROCEDURE — 99402 PREV MED CNSL INDIV APPRX 30: CPT | Mod: 25 | Performed by: NURSE PRACTITIONER

## 2017-05-11 PROCEDURE — 90472 IMMUNIZATION ADMIN EACH ADD: CPT | Performed by: NURSE PRACTITIONER

## 2017-05-11 PROCEDURE — 90715 TDAP VACCINE 7 YRS/> IM: CPT | Performed by: NURSE PRACTITIONER

## 2017-05-11 PROCEDURE — 90632 HEPA VACCINE ADULT IM: CPT | Performed by: NURSE PRACTITIONER

## 2017-05-11 PROCEDURE — 90691 TYPHOID VACCINE IM: CPT | Performed by: NURSE PRACTITIONER

## 2017-05-11 NOTE — MR AVS SNAPSHOT
After Visit Summary   5/11/2017    Lou Winston    MRN: [id number]           Patient Information     Date Of Birth          1940        Visit Information        Provider Department      5/11/2017 1:15 PM Laya Freedman, LEE CNP; [employer] [facility] [employer] [facility]        Today's Diagnoses     Travel advice encounter    -  1    Need for vaccination          Care Instructions    Today May 11, 2017 you received the    Hepatitis A Vaccine - Please return on 11/7/17 or later for your 2nd and final dose.    Tetanus (Tdap) Vaccine    Typhoid - injectable. This vaccine is valid for two years.   .    These appointments can be made as a NURSE ONLY visit.    **It is very important for the vaccinations to be given on the scheduled day(s), this helps ensure you receive the full effectiveness of the vaccine.**    Please call [facility] with any questions [phone]    Thank you for visiting [facility]            Follow-ups after your visit        Who to contact     If you have questions or need follow up information about [facility]'s clinic visit or your schedule please contact [facility] directly at [phone].  Normal or non-critical lab and imaging results will be communicated to you by MyChart, letter or phone within 4 business days after the clinic has received the results. If you do not hear from us within 7 days, please contact the clinic through MyChart or phone. If you have a critical or abnormal lab result, we will notify you by phone as soon as possible.  Submit refill requests through Snapjoy or call your pharmacy and they will forward the refill request to us. Please allow 3 business days for your refill to be completed.          Additional Information About Your Visit        MyChart Information     Snapjoy lets you send messages to your doctor, view your test results, renew your prescriptions, schedule appointments and  "more. To sign up, go to [email or web address] . Click on \"Log in\" on the left side of the screen, which will take you to the Welcome page. Then click on \"Sign up Now\" on the right side of the page.     You will be asked to enter the access code listed below, as well as some personal information. Please follow the directions to create your username and password.     Your access code is: [email or web address]  Expires: 2017  2:44 PM     Your access code will  in 90 days. If you need help or a new code, please call your [place] clinic or [phone].        Care EveryWhere ID     This is your Care EveryWhere ID. This could be used by other organizations to access your [place] medical records  [id number]        Your Vitals Were     Pulse Temperature Breastfeeding?             69 97.5  F (36.4  C) (Oral) No          Blood Pressure from Last 3 Encounters:   17 122/77   17 110/74   17 123/78    Weight from Last 3 Encounters:   17 141 lb (64 kg)   17 142 lb 3.2 oz (64.5 kg)   17 136 lb (61.7 kg)              We Performed the Following     HEPA VACCINE ADULT IM     TDAP VACCINE (ADACEL)     TYPHOID VACCINE, IM        Primary Care Provider Office Phone # Fax #    Tony Alarcon [phone]       [facility] [address] [facility] [address]        Thank you!     Thank you for choosing [facility]  for your care. Our goal is always to provide you with excellent care. Hearing back from our patients is one way we can continue to improve our services. Please take a few minutes to complete the written survey that you may receive in the mail after your visit with us. Thank you!             Your Updated Medication List - Protect others around you: Learn how to safely use, store and throw away your medicines at www.disposemymeds.org.          This list is accurate as of: 17  1:29 PM.  Always use your most recent med list.                   " Brand Name Dispense Instructions for use    acetaminophen 500 MG tablet    TYLENOL    100 tablet    Take 2 tablets (1,000 mg) by mouth every 8 hours as needed for mild pain       carboxymethylcellulose 0.5 % Soln ophthalmic solution    REFRESH PLUS    1 Bottle    Place 1 drop into both eyes 3 times daily as needed for dry eyes       diclofenac 1 % Gel topical gel    VOLTAREN    100 g    Apply 4 grams to lower back four times daily using enclosed dosing card.       DULoxetine 60 MG EC capsule    CYMBALTA    120 capsule    Take 1 capsule (60 mg) by mouth 2 times daily       gabapentin 100 MG capsule    NEURONTIN    180 capsule    TAKE TWO CAPSULES BY MOUTH AT BEDTIME       ketotifen 0.025 % Soln ophthalmic solution    KP KETOTIFEN FUMARATE    1 Bottle    Place 1 drop into both eyes 2 times daily       pantoprazole 40 MG EC tablet    PROTONIX    30 tablet    TAKE ONE TABLET BY MOUTH EVERY DAY 30-60 MINUTES BEFORE A MEAL       UNKNOWN TO PATIENT

## 2017-05-11 NOTE — PATIENT INSTRUCTIONS
Today May 11, 2017 you received the    Hepatitis A Vaccine - Please return on 11/7/17 or later for your 2nd and final dose.    Tetanus (Tdap) Vaccine    Typhoid - injectable. This vaccine is valid for two years.   .    These appointments can be made as a NURSE ONLY visit.    **It is very important for the vaccinations to be given on the scheduled day(s), this helps ensure you receive the full effectiveness of the vaccine.**    Please call [facility] with any questions [phone]    Thank you for visiting [facility]

## 2017-05-11 NOTE — PROGRESS NOTES
Nurse Note      Itinerary:  [place]       Departure Date: 05/20/2017      Return Date: 06/21/2017      Length of Trip 1 month      Reason for Travel: Visiting friends and relatives           Urban or rural: urban      Accommodations: Family home        IMMUNIZATION HISTORY  Have you received any immunizations within the past 4 weeks?  No  Have you ever fainted from having your blood drawn or from an injection?  No  Have you ever had a fever reaction to vaccination?  No  Have you ever had any bad reaction or side effect from any vaccination?  No  Have you ever had hepatitis A or B vaccine?  Yes  Do you live (or work closely) with anyone who has AIDS, an AIDS-like condition, any other immune disorder or who is on chemotherapy for cancer?  No  Do you have a family history of immunodeficiency?  No  Have you received any injection of immune globulin or any blood products during the past 12 months?  No    Patient roomed by SAULO Segundo  Lou Winston is a 77 year old female seen today with child for counsultation for international travel to [place] for Visiting friends and relatives.  Patient will be departing in  9 day(s) and staying for   1 month(s) and  traveling with mother.      Patient itinerary :  will be in the urban region [facility] which presents risk for food borne illnesses, motor vehicle accidents, Typhoid and Leishmaniasis. exposure.      Patient's activities will include sightseeing and visiting friends .    Patient's country of birth is [place]    Special medical concerns: Anxiety/depression  Pre-travel questionnaire was completed by patient and reviewed by provider.     Vitals: /77  Pulse 69  Temp 97.5  F (36.4  C) (Oral)  Breastfeeding? No  BMI= There is no height or weight on file to calculate BMI.    EXAM:  General:  Well-nourished, well-developed in no acute distress.  Appears to be stated age, interacts appropriately and expresses understanding of information  given to patient.    Current Outpatient Prescriptions   Medication Sig Dispense Refill     DULoxetine (CYMBALTA) 60 MG EC capsule Take 1 capsule (60 mg) by mouth 2 times daily 120 capsule 3     acetaminophen (TYLENOL) 500 MG tablet Take 2 tablets (1,000 mg) by mouth every 8 hours as needed for mild pain 100 tablet 0     ketotifen (KP KETOTIFEN FUMARATE) 0.025 % SOLN ophthalmic solution Place 1 drop into both eyes 2 times daily 1 Bottle 3     carboxymethylcellulose (REFRESH PLUS) 0.5 % SOLN ophthalmic solution Place 1 drop into both eyes 3 times daily as needed for dry eyes 1 Bottle 3     diclofenac (VOLTAREN) 1 % GEL topical gel Apply 4 grams to lower back four times daily using enclosed dosing card. 100 g 11     gabapentin (NEURONTIN) 100 MG capsule TAKE TWO CAPSULES BY MOUTH AT BEDTIME 180 capsule 1     pantoprazole (PROTONIX) 40 MG enteric coated tablet TAKE ONE TABLET BY MOUTH EVERY DAY 30-60 MINUTES BEFORE A MEAL 30 tablet 3     UNKNOWN TO PATIENT        Patient Active Problem List   Diagnosis     Health Care Home     Anxiety disorder     Chronic post-traumatic stress disorder     Chronic tension type headache     Cognitive disorder     Herniated disc     Limb pain     Lower back pain     Lumbago     Lumbar radiculopathy     Vitamin D deficiency     Major depressive disorder, recurrent episode, moderate (H)     H. pylori infection     Osteopenia     Subclinical hyperthyroidism     Gastroesophageal reflux disease with esophagitis     Prediabetes     Chronic pain syndrome     Chronic lumbar radiculopathy     No Known Allergies      Immunizations discussed include:   Hepatitis A:  Ordered/given today, risks, benefits and side effects reviewed  Hepatitis B: Declined  Not concerned about risk of disease  Influenza: Declined  Not concerned about risk of disease  Typhoid: Ordered/given today, risks, benefits and side effects reviewed  Rabies: Declined  Cost  Not concerned about risk of disease  Yellow Fever: Not  indicated  Bhutanese Encephalitis: Not indicated  Meningococcus: Not indicated  Tetanus/Diphtheria: Ordered/given today, risks, benefits and side effects reviewed  Measles/Mumps/Rubella: Immune by disease history per patient report  Cholera: Not needed  Polio: Up to date  Pneumococcal: Up to date  Varicella: Up to date  Zostavax:  deferred  HPV:  Not indicated  TB:  Low risk     Altitude Exposure on this trip: No    ASSESSMENT/PLAN:    ICD-10-CM    1. Travel advice encounter Z71.89 TDAP VACCINE (ADACEL)     TYPHOID VACCINE, IM     HEPA VACCINE ADULT IM   2. Need for vaccination Z23 TDAP VACCINE (ADACEL)     TYPHOID VACCINE, IM     HEPA VACCINE ADULT IM     I have reviewed general recommendations for safe travel   including: food/water precautions, insect precautions, safer sex   practices given high prevalence of Zika, HIV and other STDs,   roadway safety. Educational materials and [employer] report provided.    Malaraia prophylaxis recommended: none  Symptomatic treatment for traveler's diarrhea: loperamide/diphenoxylate  Altitude illness prevention and treatment: no      Evacuation insurance advised and resources were provided to patient.    Total visit time 30 minutes  with over 50% of time spent counseling patient as detailed above.    Laya Freedman CNP

## 2017-05-15 ENCOUNTER — OFFICE VISIT (OUTPATIENT)
Dept: FAMILY MEDICINE | Facility: CLINIC | Age: 77
End: 2017-05-15

## 2017-05-15 VITALS
RESPIRATION RATE: 16 BRPM | DIASTOLIC BLOOD PRESSURE: 69 MMHG | TEMPERATURE: 98.2 F | WEIGHT: 141.2 LBS | SYSTOLIC BLOOD PRESSURE: 107 MMHG | OXYGEN SATURATION: 96 % | HEART RATE: 85 BPM | BODY MASS INDEX: 26.68 KG/M2

## 2017-05-15 DIAGNOSIS — F33.1 MAJOR DEPRESSIVE DISORDER, RECURRENT EPISODE, MODERATE (H): ICD-10-CM

## 2017-05-15 DIAGNOSIS — M54.16 CHRONIC LUMBAR RADICULOPATHY: ICD-10-CM

## 2017-05-15 DIAGNOSIS — A08.4 VIRAL GASTROENTERITIS: Primary | ICD-10-CM

## 2017-05-15 DIAGNOSIS — K21.9 GASTROESOPHAGEAL REFLUX DISEASE WITHOUT ESOPHAGITIS: ICD-10-CM

## 2017-05-15 RX ORDER — DULOXETIN HYDROCHLORIDE 60 MG/1
60 CAPSULE, DELAYED RELEASE ORAL 2 TIMES DAILY
Qty: 120 CAPSULE | Refills: 11 | Status: SHIPPED | OUTPATIENT
Start: 2017-05-15 | End: 2018-05-25

## 2017-05-15 RX ORDER — PANTOPRAZOLE SODIUM 40 MG/1
TABLET, DELAYED RELEASE ORAL
Qty: 30 TABLET | Refills: 11 | Status: SHIPPED | OUTPATIENT
Start: 2017-05-15 | End: 2018-02-19

## 2017-05-15 RX ORDER — ACETAMINOPHEN 500 MG
1000 TABLET ORAL EVERY 8 HOURS PRN
Qty: 100 TABLET | Refills: 0 | Status: SHIPPED | OUTPATIENT
Start: 2017-05-15 | End: 2017-05-15

## 2017-05-15 RX ORDER — ACETAMINOPHEN 500 MG
1000 TABLET ORAL EVERY 8 HOURS PRN
Qty: 100 TABLET | Refills: 11 | Status: SHIPPED | OUTPATIENT
Start: 2017-05-15 | End: 2018-02-19

## 2017-05-15 RX ORDER — LOPERAMIDE HYDROCHLORIDE 2 MG/1
TABLET ORAL
Qty: 30 TABLET | Refills: 0 | Status: SHIPPED | OUTPATIENT
Start: 2017-05-15 | End: 2018-10-10

## 2017-05-15 ASSESSMENT — ANXIETY QUESTIONNAIRES
1. FEELING NERVOUS, ANXIOUS, OR ON EDGE: SEVERAL DAYS
GAD7 TOTAL SCORE: 8
7. FEELING AFRAID AS IF SOMETHING AWFUL MIGHT HAPPEN: SEVERAL DAYS
2. NOT BEING ABLE TO STOP OR CONTROL WORRYING: SEVERAL DAYS
IF YOU CHECKED OFF ANY PROBLEMS ON THIS QUESTIONNAIRE, HOW DIFFICULT HAVE THESE PROBLEMS MADE IT FOR YOU TO DO YOUR WORK, TAKE CARE OF THINGS AT HOME, OR GET ALONG WITH OTHER PEOPLE: SOMEWHAT DIFFICULT
3. WORRYING TOO MUCH ABOUT DIFFERENT THINGS: SEVERAL DAYS
6. BECOMING EASILY ANNOYED OR IRRITABLE: MORE THAN HALF THE DAYS
5. BEING SO RESTLESS THAT IT IS HARD TO SIT STILL: SEVERAL DAYS

## 2017-05-15 ASSESSMENT — PATIENT HEALTH QUESTIONNAIRE - PHQ9: 5. POOR APPETITE OR OVEREATING: SEVERAL DAYS

## 2017-05-15 NOTE — PROGRESS NOTES
Preceptor Attestation:   Patient seen and discussed with the resident. Assessment and plan reviewed with resident and agreed upon.   Supervising Physician:  Walter Horn MD  [facility]

## 2017-05-15 NOTE — PATIENT INSTRUCTIONS
Here is the plan from today's visit    1. Viral gastroenteritis  PLAN:  -Patient education and reassurance provided.  -Discussed etiology and expected course.   -Supportive care.  Encourage fluids.  - loperamide (IMODIUM A-D) 2 MG tablet; Take 2 tabs (4 mg) after first loose stool, and then take one tab (2 mg) after each diarrheal stool.  Max of 8 tabs (16 mg) per day.  Dispense: 30 tablet; Refill: 0    2. Chronic lumbar radiculopathy  - acetaminophen (TYLENOL) 500 MG tablet; Take 2 tablets (1,000 mg) by mouth every 8 hours as needed for mild pain  Dispense: 100 tablet; Refill: 11  - diclofenac (VOLTAREN) 1 % GEL topical gel; Apply 4 grams to lower back four times daily using enclosed dosing card.  Dispense: 100 g; Refill: 11  - DULoxetine (CYMBALTA) 60 MG EC capsule; Take 1 capsule (60 mg) by mouth 2 times daily  Dispense: 120 capsule; Refill: 11    3. Gastroesophageal reflux disease without esophagitis  - pantoprazole (PROTONIX) 40 MG EC tablet; TAKE ONE TABLET BY MOUTH EVERY DAY 30-60 MINUTES BEFORE A MEAL  Dispense: 30 tablet; Refill: 11    4. Major depressive disorder, recurrent episode, moderate (H)    - DULoxetine (CYMBALTA) 60 MG EC capsule; Take 1 capsule (60 mg) by mouth 2 times daily  Dispense: 120 capsule; Refill: 11      Please call or return to clinic if your symptoms don't go away.    Follow up plan  Follow up as needed    Thank you for coming to [facility] today.  Lab Testing:  **If you had lab testing today and your results are reassuring or normal they will be mailed to you or sent through [employer]ience within 7 days.   **If the lab tests need quick action we will call you with the results.  The phone number we will call with results is # [phone] (home) . If this is not the best number please call our clinic and change the number.  Medication Refills:  If you need any refills please call your pharmacy and they will contact us.   If you need to  your refill at a new pharmacy, please  contact the new pharmacy directly. The new pharmacy will help you get your medications transferred faster.   Scheduling:  If you have any concerns about today's visit or wish to schedule another appointment please call our office during normal business hours [phone] (8-5:00 M-F)  If a referral was made to a [facility] and you don't get a call from central scheduling please call [phone].  If a Mammogram was ordered for you at [facility] call [phone] to schedule or change your appointment.  If you had an XRay/CT/Ultrasound/MRI ordered the number is [phone] to schedule or change your radiology appointment.   Medical Concerns:  If you have urgent medical concerns please call [phone] at any time of the day.  If you have a medical emergency please call 614.

## 2017-05-15 NOTE — NURSING NOTE
name: Iramadonna Wray  Language: Oromo  Agency: [employer]  Phone number: [phone]    Mirela Nieto

## 2017-05-15 NOTE — TELEPHONE ENCOUNTER
MAPAP 500mg Tablets      Last Written Prescription Date: 03/22/17  Last Fill Quantity: 100,  # refills: 0   Last Office Visit with [facility], [facility] or [facility] prescribing provider: 05/11/17    Thank You  Cha Franklin CPhT, [facility] Technician  [phone]  [email or web address]

## 2017-05-15 NOTE — PROGRESS NOTES
HPI:       Lou Winston is a 77 year old who presents for the following  Patient presents with:  Refill Request  Abdominal Pain: diarrhea, cramping for 2 days    Diarrhea: Report she has been dealing with diarrhea for the past 2 days. Denies any fever. Endorse mild abdomina pain. Denies any bloody stools. She drink plenty of fluids. She is leaving on May 20th, 2017 to [facility] to visit her son.     Chronic lumbar radiculopathy; Report her back pain is better with diclofenac cream and duloxetine. She is able to do activities.     Depression: Reports she is feeling better and able to do activities. She feels like the medications helps. She feels sad that I am leaving the clinic. She is excited that she is leaving in 5 days to [place] and spent time with her son.     Problem, Medication and Allergy Lists were reviewed and are current.  Patient is an established patient of this clinic.         Review of Systems:   Review of Systems   Constitutional: Positive for fatigue. Negative for fever.   Respiratory: Negative for cough and shortness of breath.    Cardiovascular: Negative for chest pain, palpitations and leg swelling.   Gastrointestinal: Positive for abdominal pain and diarrhea. Negative for nausea and vomiting.   Musculoskeletal: Positive for back pain.   Psychiatric/Behavioral: Negative for dysphoric mood.             Physical Exam:   Patient Vitals for the past 24 hrs:   BP Temp Temp src Pulse Resp SpO2 Weight   05/15/17 1627 107/69 98.2  F (36.8  C) Oral 85 16 96 % 141 lb 3.2 oz (64 kg)     Body mass index is 26.68 kg/(m^2).  Vitals were reviewed and were normal     Physical Exam   Constitutional: She is oriented to person, place, and time. She appears well-developed and well-nourished.   HENT:   Head: Normocephalic and atraumatic.   Cardiovascular: Normal rate, regular rhythm and normal heart sounds.    No murmur heard.  Pulmonary/Chest: Effort normal and breath sounds normal. No respiratory  distress. She has no wheezes. She has no rales.   Neurological: She is alert and oriented to person, place, and time.   Psychiatric: She has a normal mood and affect. Her speech is normal and behavior is normal. Judgment and thought content normal. Cognition and memory are normal.       Results:      Results from the last 24 hoursNo results found for this or any previous visit (from the past 24 hour(s)).  Assessment and Plan     Lou was seen today for refill request and abdominal pain.    Diagnoses and all orders for this visit:    Viral gastroenteritis:  PLAN:  -Patient education and reassurance provided.  -Discussed etiology and expected course.   -Supportive care.  Encourage fluids.  -     loperamide (IMODIUM A-D) 2 MG tablet; Take 2 tabs (4 mg) after first loose stool, and then take one tab (2 mg) after each diarrheal stool.  Max of 8 tabs (16 mg) per day.    Major depressive disorder, recurrent episode, moderate (H): improving. Her PHQ-9 score of 12 today.   -Continue Duloxetine 60 mg BID and refilled for 11 months   -Encouraged to continue exercise and getting involve at social events   -     DULoxetine (CYMBALTA) 60 MG EC capsule; Take 1 capsule (60 mg) by mouth 2 times daily    Chronic lumbar radiculopathy  -     acetaminophen (TYLENOL) 500 MG tablet; Take 2 tablets (1,000 mg) by mouth every 8 hours as needed for mild pain  -     diclofenac (VOLTAREN) 1 % GEL topical gel; Apply 4 grams to lower back four times daily using enclosed dosing card.  -     DULoxetine (CYMBALTA) 60 MG EC capsule; Take 1 capsule (60 mg) by mouth 2 times daily    Gastroesophageal reflux disease without esophagitis  -     pantoprazole (PROTONIX) 40 MG EC tablet; TAKE ONE TABLET BY MOUTH EVERY DAY 30-60 MINUTES BEFORE A MEAL      There are no discontinued medications.  Options for treatment and follow-up care were reviewed with the patient. Lou Winston  engaged in the decision making process and verbalized understanding of the  options discussed and agreed with the final plan.    Tony Alarcon MD  PGY 3 [facility]  [phone](pg)

## 2017-05-15 NOTE — MR AVS SNAPSHOT
After Visit Summary   5/15/2017    Lou Winston    MRN: [id number]           Patient Information     Date Of Birth          1940        Visit Information        Provider Department      5/15/2017 4:20 PM Tony Alarcon MD [facility]        Today's Diagnoses     Viral gastroenteritis    -  1    Chronic lumbar radiculopathy        Gastroesophageal reflux disease without esophagitis        Major depressive disorder, recurrent episode, moderate (H)          Care Instructions    Here is the plan from today's visit    1. Viral gastroenteritis  PLAN:  -Patient education and reassurance provided.  -Discussed etiology and expected course.   -Supportive care.  Encourage fluids.  - loperamide (IMODIUM A-D) 2 MG tablet; Take 2 tabs (4 mg) after first loose stool, and then take one tab (2 mg) after each diarrheal stool.  Max of 8 tabs (16 mg) per day.  Dispense: 30 tablet; Refill: 0    2. Chronic lumbar radiculopathy  - acetaminophen (TYLENOL) 500 MG tablet; Take 2 tablets (1,000 mg) by mouth every 8 hours as needed for mild pain  Dispense: 100 tablet; Refill: 11  - diclofenac (VOLTAREN) 1 % GEL topical gel; Apply 4 grams to lower back four times daily using enclosed dosing card.  Dispense: 100 g; Refill: 11  - DULoxetine (CYMBALTA) 60 MG EC capsule; Take 1 capsule (60 mg) by mouth 2 times daily  Dispense: 120 capsule; Refill: 11    3. Gastroesophageal reflux disease without esophagitis  - pantoprazole (PROTONIX) 40 MG EC tablet; TAKE ONE TABLET BY MOUTH EVERY DAY 30-60 MINUTES BEFORE A MEAL  Dispense: 30 tablet; Refill: 11    4. Major depressive disorder, recurrent episode, moderate (H)    - DULoxetine (CYMBALTA) 60 MG EC capsule; Take 1 capsule (60 mg) by mouth 2 times daily  Dispense: 120 capsule; Refill: 11      Please call or return to clinic if your symptoms don't go away.    Follow up plan  Follow up as needed    Thank you for coming to [facility] today.  Lab Testing:  **If  you had lab testing today and your results are reassuring or normal they will be mailed to you or sent through [employer] within 7 days.   **If the lab tests need quick action we will call you with the results.  The phone number we will call with results is # [phone] (home) . If this is not the best number please call our clinic and change the number.  Medication Refills:  If you need any refills please call your pharmacy and they will contact us.   If you need to  your refill at a new pharmacy, please contact the new pharmacy directly. The new pharmacy will help you get your medications transferred faster.   Scheduling:  If you have any concerns about today's visit or wish to schedule another appointment please call our office during normal business hours [phone] (8-5:00 M-F)  If a referral was made to a [facility] and you don't get a call from central scheduling please call [phone].  If a Mammogram was ordered for you at [facility] call [phone] to schedule or change your appointment.  If you had an XRay/CT/Ultrasound/MRI ordered the number is [phone] to schedule or change your radiology appointment.   Medical Concerns:  If you have urgent medical concerns please call [phone] at any time of the day.  If you have a medical emergency please call 360.        Follow-ups after your visit        Who to contact     Please call your clinic at [phone] to:    Ask questions about your health    Make or cancel appointments    Discuss your medicines    Learn about your test results    Speak to your doctor   If you have compliments or concerns about an experience at your clinic, or if you wish to file a complaint, please contact [facility] Patient Relations at [phone] or email us at [email or web address]         Additional Information About Your Visit        [employer] Information     [employer] is an electronic gateway that  provides easy, online access to your medical records. With [employer], you can request a clinic appointment, read your test results, renew a prescription or communicate with your care team.     To sign up for [employer] visit the website at [email or web address]   You will be asked to enter the access code listed below, as well as some personal information. Please follow the directions to create your username and password.     Your access code is: [email or web address]  Expires: 2017  2:44 PM     Your access code will  in 90 days. If you need help or a new code, please contact your [facility] or call [phone] for assistance.        Care EveryWhere ID     This is your Care EveryWhere ID. This could be used by other organizations to access your [facility] medical records  [id number]        Your Vitals Were     Pulse Temperature Respirations Pulse Oximetry BMI (Body Mass Index)       85 98.2  F (36.8  C) (Oral) 16 96% 26.68 kg/m2        Blood Pressure from Last 3 Encounters:   05/15/17 107/69   17 122/77   17 110/74    Weight from Last 3 Encounters:   05/15/17 141 lb 3.2 oz (64 kg)   17 141 lb (64 kg)   17 142 lb 3.2 oz (64.5 kg)              Today, you had the following     No orders found for display         Today's Medication Changes          These changes are accurate as of: 5/15/17  4:58 PM.  If you have any questions, ask your nurse or doctor.               Start taking these medicines.        Dose/Directions    acetaminophen 500 MG tablet   Commonly known as:  TYLENOL   Used for:  Chronic lumbar radiculopathy   Started by:  Tony Alarcon MD        Dose:  1000 mg   Take 2 tablets (1,000 mg) by mouth every 8 hours as needed for mild pain   Quantity:  100 tablet   Refills:  11       loperamide 2 MG tablet   Commonly known as:  IMODIUM A-D   Used for:  Viral gastroenteritis   Started by:  Tony Alarcon MD        Take 2 tabs (4 mg) after first loose  stool, and then take one tab (2 mg) after each diarrheal stool.  Max of 8 tabs (16 mg) per day.   Quantity:  30 tablet   Refills:  0            Where to get your medicines      These medications were sent to [facility] [place], [place] - [address] [facility] [address]     Phone:  [phone]     acetaminophen 500 MG tablet    diclofenac 1 % Gel topical gel    DULoxetine 60 MG EC capsule    loperamide 2 MG tablet    pantoprazole 40 MG EC tablet                Primary Care Provider Office Phone # Fax #    Tony Alarcon [phone]       [facility] [address]  [facility] [address]        Thank you!     Thank you for choosing [facility]  for your care. Our goal is always to provide you with excellent care. Hearing back from our patients is one way we can continue to improve our services. Please take a few minutes to complete the written survey that you may receive in the mail after your visit with us. Thank you!             Your Updated Medication List - Protect others around you: Learn how to safely use, store and throw away your medicines at www.disposemymeds.org.          This list is accurate as of: 5/15/17  4:58 PM.  Always use your most recent med list.                   Brand Name Dispense Instructions for use    acetaminophen 500 MG tablet    TYLENOL    100 tablet    Take 2 tablets (1,000 mg) by mouth every 8 hours as needed for mild pain       carboxymethylcellulose 0.5 % Soln ophthalmic solution    REFRESH PLUS    1 Bottle    Place 1 drop into both eyes 3 times daily as needed for dry eyes       diclofenac 1 % Gel topical gel    VOLTAREN    100 g    Apply 4 grams to lower back four times daily using enclosed dosing card.       DULoxetine 60 MG EC capsule    CYMBALTA    120 capsule    Take 1 capsule (60 mg) by mouth 2 times daily       gabapentin 100 MG capsule    NEURONTIN    180 capsule    TAKE TWO CAPSULES BY MOUTH AT  BEDTIME       ketotifen 0.025 % Soln ophthalmic solution    KP KETOTIFEN FUMARATE    1 Bottle    Place 1 drop into both eyes 2 times daily       loperamide 2 MG tablet    IMODIUM A-D    30 tablet    Take 2 tabs (4 mg) after first loose stool, and then take one tab (2 mg) after each diarrheal stool.  Max of 8 tabs (16 mg) per day.       pantoprazole 40 MG EC tablet    PROTONIX    30 tablet    TAKE ONE TABLET BY MOUTH EVERY DAY 30-60 MINUTES BEFORE A MEAL       UNKNOWN TO PATIENT

## 2017-05-16 ASSESSMENT — ENCOUNTER SYMPTOMS
DIARRHEA: 1
DYSPHORIC MOOD: 0
COUGH: 0
VOMITING: 0
BACK PAIN: 1
FEVER: 0
FATIGUE: 1
NAUSEA: 0
PALPITATIONS: 0
ABDOMINAL PAIN: 1
SHORTNESS OF BREATH: 0

## 2017-05-16 ASSESSMENT — ANXIETY QUESTIONNAIRES: GAD7 TOTAL SCORE: 8

## 2017-05-16 ASSESSMENT — PATIENT HEALTH QUESTIONNAIRE - PHQ9: SUM OF ALL RESPONSES TO PHQ QUESTIONS 1-9: 12

## 2017-06-28 ENCOUNTER — DOCUMENTATION ONLY (OUTPATIENT)
Dept: FAMILY MEDICINE | Facility: CLINIC | Age: 77
End: 2017-06-28

## 2017-07-14 NOTE — PROGRESS NOTES
Visit to the client's home for annual health risk assessment and PCA assessment.  An  was present.    Current situation/living environment/hospitalization: Client is living alone in a one bedroom apartment with support from PCA and HMKR. Client is clean and free from clutter.     Activities of daily living (ADL)/instrumental activities of daily living (IADL) and functional issues: Client states that due to her low back pain that radiates down her right leg, she needs assistance with all IADL's d/t poor standing endurance and inability to lift and carry things. She also said she needs help dressing and bathing her L/E because she is not able to bend all the way forward d/t to pain. Recommend assistance in and out of tub for safety. HMKR and PCA in place to meet needs.     Health concerns/updates: As stated above, clients main c/o today is her pain. She said the neurontin helps but doesn't last. She said she would like to try PT again. She went in the past and thought it helped some. She will discuss with PCP at next appt.     Cognition/mental health: Client reports that she is more forgetful to recent events. She also reports that she feels like her depression has been better the past month. She went to see her son in [place] recently and she thinks this helped. Her ADC was increased last year which she said has been helpful as well.     Additional info: Discussed mammoram and colon CA screening with client. Client declines.     Client's Plan of Care consists of:  PCA (1.25 hours a day), HMKG (5 hours a week), Adult day care (3 days a week) and Supplies and equipment as needed      Melanie Lewis RN  [facility] Care Coordinator  [phone]

## 2017-07-14 NOTE — PROGRESS NOTES
Visit to the client's home for annual health risk assessment and PCA assessment.  An  was present.    Current situation/living environment/hospitalization: Client is living alone in a one bedroom apartment with support from PCA and HMKR. Client is clean and free from clutter.     Activities of daily living (ADL)/instrumental activities of daily living (IADL) and functional issues: Client states that due to her low back pain that radiates down her right leg, she needs assistance with all IADL's d/t poor standing endurance and inability to lift and carry things. She also said she needs help dressing and bathing her L/E because she is not able to bend all the way forward d/t to pain. Recommend assistance in and out of tub for safety. HMKR and PCA in place to meet needs.     Health concerns/updates: As stated above, clients main c/o today is her pain. She said the neurontin helps but doesn't last. She said she would like to try PT again. She went in the past and thought it helped some. She will discuss with PCP at next appt.     Cognition/mental health: Client reports that she is more forgetful to recent events. She also reports that she feels like her depression has been better the past month. She went to see her son in [place] recently and she thinks this helped. Her ADC was increased last year which she said has been helpful as well.     Additional info: Discussed mammoram and colon CA screening with client. Client declines.     Client's Plan of Care consists of:  PCA (1.25 hours a day), HMKG (5 hours a week), Adult day care (3 days a week) and Supplies and equipment as needed      Melanie Lewis RN  [facility] Care Coordinator  [phone]

## 2017-07-25 ENCOUNTER — OFFICE VISIT (OUTPATIENT)
Dept: FAMILY MEDICINE | Facility: CLINIC | Age: 77
End: 2017-07-25

## 2017-07-25 VITALS
OXYGEN SATURATION: 100 % | WEIGHT: 144.8 LBS | DIASTOLIC BLOOD PRESSURE: 77 MMHG | TEMPERATURE: 97.9 F | SYSTOLIC BLOOD PRESSURE: 122 MMHG | RESPIRATION RATE: 18 BRPM | BODY MASS INDEX: 27.36 KG/M2 | HEART RATE: 83 BPM

## 2017-07-25 DIAGNOSIS — M54.16 CHRONIC LUMBAR RADICULOPATHY: Primary | ICD-10-CM

## 2017-07-25 ASSESSMENT — ENCOUNTER SYMPTOMS
BACK PAIN: 1
PALPITATIONS: 0
COUGH: 0
SHORTNESS OF BREATH: 0

## 2017-07-25 ASSESSMENT — PAIN SCALES - GENERAL: PAINLEVEL: EXTREME PAIN (9)

## 2017-07-25 NOTE — PROGRESS NOTES
Preceptor Attestation:   Patient seen and discussed with the resident. Assessment and plan reviewed with resident and agreed upon.   Supervising Physician:  Tiffany Cabrera MD  [facility]

## 2017-07-25 NOTE — MR AVS SNAPSHOT
After Visit Summary   7/25/2017    Lou Winston    MRN: [id number]           Patient Information     Date Of Birth          1940        Visit Information        Provider Department      7/25/2017 4:00 PM Tony Alarcon MD [facility]        Today's Diagnoses     Chronic lumbar radiculopathy    -  1      Care Instructions    Here is the plan from today's visit    1. Chronic lumbar radiculopathy  -Increase Duloxetine 60 mg twice daily   -Continue diclofenac cream four times per day and tylenol prn   -Will do trial of physical therapy for 6 weeks.   -IF not improving then consider back surgery   - [facility]        Please call or return to clinic if your symptoms don't go away.    Follow up plan  FOllow up in 7 weeks     Thank you for coming to [facility] today.  Lab Testing:  **If you had lab testing today and your results are reassuring or normal they will be mailed to you or sent through [employer] within 7 days.   **If the lab tests need quick action we will call you with the results.  The phone number we will call with results is # [phone] (home) . If this is not the best number please call our clinic and change the number.  Medication Refills:  If you need any refills please call your pharmacy and they will contact us.   If you need to  your refill at a new pharmacy, please contact the new pharmacy directly. The new pharmacy will help you get your medications transferred faster.   Scheduling:  If you have any concerns about today's visit or wish to schedule another appointment please call our office during normal business hours [phone] (8-5:00 M-F)  If a referral was made to a [facility] and you don't get a call from central scheduling please call [phone].  If a Mammogram was ordered for you at [facility] call [phone] to schedule or change your appointment.  If you had an XRay/CT/Ultrasound/MRI  "ordered the number is [phone] to schedule or change your radiology appointment.   Medical Concerns:  If you have urgent medical concerns please call [phone] at any time of the day.  If you have a medical emergency please call 911.          Follow-ups after your visit        Additional Services     [facility]       *This therapy referral will be filtered to a centralized scheduling office at [facility] and the patient will receive a call to schedule an appointment at a [facility] location most convenient for them. *     [facility] provides Physical Therapy evaluation and treatment and many specialty services across the [facility] system.  If requesting a specialty program, please choose from the list below.    If you have not heard from the scheduling office within 2 business days, please call [phone] for all locations, with the exception of [place], please call [phone].  Treatment: Evaluation & Treatment  Special Instructions/Modalities: None  Special Programs: None    Please be aware that coverage of these services is subject to the terms and limitations of your health insurance plan.  Call member services at your health plan with any benefit or coverage questions.      **Note to Provider:  If you are referring outside of [facility] for the therapy appointment, please list the name of the location in the \"special instructions\" above, print the referral and give to the patient to schedule the appointment.                  Who to contact     Please call your clinic at [phone] to:    Ask questions about your health    Make or cancel appointments    Discuss your medicines    Learn about your test results    Speak to your doctor   If you have compliments or concerns about an experience at your clinic, or if you wish to file a complaint, please contact [facility] Patient Relations at [phone] or email us at " [email or web address]         Additional Information About Your Visit        Verified [employer] Information     [employer] is an electronic gateway that provides easy, online access to your medical records. With [employer], you can request a clinic appointment, read your test results, renew a prescription or communicate with your care team.     To sign up for [employer] visit the website at [email or web address]   You will be asked to enter the access code listed below, as well as some personal information. Please follow the directions to create your username and password.     Your access code is: [email or web address]  Expires: 10/23/2017  4:39 PM     Your access code will  in 90 days. If you need help or a new code, please contact your [facility] or call [phone] for assistance.        Care EveryWhere ID     This is your Care EveryWhere ID. This could be used by other organizations to access your [facility] medical records  [id number]        Your Vitals Were     Pulse Temperature Respirations Pulse Oximetry Breastfeeding? BMI (Body Mass Index)    83 97.9  F (36.6  C) (Oral) 18 100% No 27.36 kg/m2       Blood Pressure from Last 3 Encounters:   17 122/77   05/15/17 107/69   17 122/77    Weight from Last 3 Encounters:   17 144 lb 12.8 oz (65.7 kg)   05/15/17 141 lb 3.2 oz (64 kg)   17 141 lb (64 kg)              We Performed the Following     [facility]        Primary Care Provider Office Phone # Fax #    Ernestomeghana Esteban [phone]       [facility]  [address] [address]        Equal Access to Services     MAHI LYNCH : Giselle Rocha, joann christensen, mary noriega. So [facility] [phone].    ATENCIÓN: Si habla español, tiene a castano disposición servicios gratuitos de asistencia lingüística. Llame al [phone].    We comply  with applicable federal civil rights laws and [place] laws. We do not discriminate on the basis of race, color, national origin, age, disability sex, sexual orientation or gender identity.            Thank you!     Thank you for choosing [facility]  for your care. Our goal is always to provide you with excellent care. Hearing back from our patients is one way we can continue to improve our services. Please take a few minutes to complete the written survey that you may receive in the mail after your visit with us. Thank you!             Your Updated Medication List - Protect others around you: Learn how to safely use, store and throw away your medicines at www.disposemymeds.org.          This list is accurate as of: 7/25/17  4:40 PM.  Always use your most recent med list.                   Brand Name Dispense Instructions for use Diagnosis    acetaminophen 500 MG tablet    TYLENOL    100 tablet    Take 2 tablets (1,000 mg) by mouth every 8 hours as needed for mild pain    Chronic lumbar radiculopathy       carboxymethylcellulose 0.5 % Soln ophthalmic solution    REFRESH PLUS    1 Bottle    Place 1 drop into both eyes 3 times daily as needed for dry eyes    Dry eyes       diclofenac 1 % Gel topical gel    VOLTAREN    100 g    Apply 4 grams to lower back four times daily using enclosed dosing card.    Chronic lumbar radiculopathy       DULoxetine 60 MG EC capsule    CYMBALTA    120 capsule    Take 1 capsule (60 mg) by mouth 2 times daily    Major depressive disorder, recurrent episode, moderate (H), Chronic lumbar radiculopathy       gabapentin 100 MG capsule    NEURONTIN    180 capsule    TAKE TWO CAPSULES BY MOUTH AT BEDTIME    Chronic pain syndrome       ketotifen 0.025 % Soln ophthalmic solution    KP KETOTIFEN FUMARATE    1 Bottle    Place 1 drop into both eyes 2 times daily    Seasonal allergic conjunctivitis       loperamide 2 MG tablet    IMODIUM A-D    30 tablet    Take 2 tabs (4 mg)  after first loose stool, and then take one tab (2 mg) after each diarrheal stool.  Max of 8 tabs (16 mg) per day.    Viral gastroenteritis       pantoprazole 40 MG EC tablet    PROTONIX    30 tablet    TAKE ONE TABLET BY MOUTH EVERY DAY 30-60 MINUTES BEFORE A MEAL    Gastroesophageal reflux disease without esophagitis       UNKNOWN TO PATIENT       Stephen

## 2017-07-25 NOTE — PROGRESS NOTES
HPI:       Lou Winston is a 77 year old who presents for the following  Patient presents with:  Back Pain: starts in low back going all the way down right leg pain, ongoing but geting worse     Chronic lumbar radiculopathy: Report she started having back pain. She takes duloxetine 60 mg once or twice daily. She takes tylenol 500 mg three times per day. She recently traveled to [place] and came back to few weeks. She had MRI in 2011 which showed there is a broad-based disc bulge asymmetric to the right with facet arthropathy at L4-L5 causing mild to moderate right neural foraminal stenosis. She had tried steroid injection in the past with mild improvement. She is not interested in surgery option. She would like to try physical therapy      Problem, Medication and Allergy Lists were reviewed and are current.  Patient is an established patient of this clinic.         Review of Systems:   Review of Systems   Respiratory: Negative for cough and shortness of breath.    Cardiovascular: Negative for chest pain, palpitations and leg swelling.   Musculoskeletal: Positive for back pain.             Physical Exam:   Patient Vitals for the past 24 hrs:   BP Temp Temp src Pulse Resp SpO2 Weight   07/25/17 1624 122/77 97.9  F (36.6  C) Oral 83 18 100 % 144 lb 12.8 oz (65.7 kg)     Body mass index is 27.36 kg/(m^2).  Vitals were reviewed and were normal     Physical Exam   Constitutional: She is oriented to person, place, and time. She appears well-developed and well-nourished. No distress.   HENT:   Head: Normocephalic and atraumatic.   Cardiovascular: Normal rate, regular rhythm and normal heart sounds.    No murmur heard.  Pulmonary/Chest: Effort normal and breath sounds normal. No respiratory distress. She has no wheezes. She has no rales.   Musculoskeletal:        Lumbar back: She exhibits decreased range of motion, tenderness and bony tenderness.   Neurological: She is alert and oriented to person, place, and  time.         Results:      Results from the last 24 hoursNo results found for this or any previous visit (from the past 24 hour(s)).  Assessment and Plan     Patient Instructions   Here is the plan from today's visit    1. Chronic lumbar radiculopathy: MRI in 2011 showed large disc bulging  asymmetric to the right with facet arthropathy at L4-L5 causing moderate neural foraminal stenosis.  -Increase Duloxetine to 60 mg twice daily   -Continue diclofenac cream four times per day and tylenol prn   -Will do trial of physical therapy for 6 weeks.   -MRI of lumbar to revaluate the lumbar stenosis progression.  -Sport Medicine referral    - [facility]    Please call or return to clinic if your symptoms don't go away.    Follow up plan  FOllow up in 7 weeks     Thank you for coming to [facility] today.    There are no discontinued medications.  Options for treatment and follow-up care were reviewed with the patient. Lou Winston  engaged in the decision making process and verbalized understanding of the options discussed and agreed with the final plan.    oTny Alarcon MD  PGY 3 [facility]  [phone](pg)

## 2017-07-25 NOTE — PATIENT INSTRUCTIONS
Here is the plan from today's visit    1. Chronic lumbar radiculopathy  -Increase Duloxetine 60 mg twice daily   -Continue diclofenac cream four times per day and tylenol prn   -Will do trial of physical therapy for 6 weeks.   -IF not improving then consider back surgery   - [facility]        Please call or return to clinic if your symptoms don't go away.    Follow up plan  FOllow up in 7 weeks     Thank you for coming to [facility] today.  Lab Testing:  **If you had lab testing today and your results are reassuring or normal they will be mailed to you or sent through [employer] within 7 days.   **If the lab tests need quick action we will call you with the results.  The phone number we will call with results is # [phone] (home) . If this is not the best number please call our clinic and change the number.  Medication Refills:  If you need any refills please call your pharmacy and they will contact us.   If you need to  your refill at a new pharmacy, please contact the new pharmacy directly. The new pharmacy will help you get your medications transferred faster.   Scheduling:  If you have any concerns about today's visit or wish to schedule another appointment please call our office during normal business hours [phone] (8-5:00 M-F)  If a referral was made to a [facility] and you don't get a call from central scheduling please call [phone].  If a Mammogram was ordered for you at [facility] call [phone] to schedule or change your appointment.  If you had an XRay/CT/Ultrasound/MRI ordered the number is [phone] to schedule or change your radiology appointment.   Medical Concerns:  If you have urgent medical concerns please call [phone] at any time of the day.  If you have a medical emergency please call [phone].

## 2017-07-25 NOTE — NURSING NOTE
nterpreter name:Cydney Griffin  Language: Oromo  Agency: [facility]  Phone number: [phone]  Rocio Small CMA

## 2017-07-27 ENCOUNTER — TELEPHONE (OUTPATIENT)
Dept: FAMILY MEDICINE | Facility: CLINIC | Age: 77
End: 2017-07-27

## 2017-08-14 ENCOUNTER — HOSPITAL ENCOUNTER (OUTPATIENT)
Dept: MRI IMAGING | Facility: CLINIC | Age: 77
Discharge: HOME | End: 2017-08-14
Attending: FAMILY MEDICINE | Admitting: FAMILY MEDICINE
Payer: COMMERCIAL

## 2017-08-14 DIAGNOSIS — M54.16 CHRONIC LUMBAR RADICULOPATHY: ICD-10-CM

## 2017-08-14 PROCEDURE — 72148 MRI LUMBAR SPINE W/O DYE: CPT

## 2017-09-29 ENCOUNTER — OFFICE VISIT (OUTPATIENT)
Dept: FAMILY MEDICINE | Facility: CLINIC | Age: 77
End: 2017-09-29

## 2017-09-29 VITALS
TEMPERATURE: 98.4 F | BODY MASS INDEX: 27.66 KG/M2 | WEIGHT: 146.4 LBS | DIASTOLIC BLOOD PRESSURE: 79 MMHG | OXYGEN SATURATION: 98 % | HEART RATE: 79 BPM | RESPIRATION RATE: 16 BRPM | SYSTOLIC BLOOD PRESSURE: 117 MMHG

## 2017-09-29 DIAGNOSIS — M54.16 CHRONIC LUMBAR RADICULOPATHY: ICD-10-CM

## 2017-09-29 DIAGNOSIS — M62.81 GENERALIZED MUSCLE WEAKNESS: Primary | ICD-10-CM

## 2017-09-29 DIAGNOSIS — H04.123 DRY EYES: ICD-10-CM

## 2017-09-29 DIAGNOSIS — R53.82 CHRONIC FATIGUE: ICD-10-CM

## 2017-09-29 LAB
CK SERPL-CCNC: NORMAL U/L (ref 30–225)
TSH SERPL DL<=0.005 MIU/L-ACNC: 0.68 MU/L (ref 0.4–4)

## 2017-09-29 RX ORDER — CARBOXYMETHYLCELLULOSE SODIUM 5 MG/ML
1 SOLUTION/ DROPS OPHTHALMIC 3 TIMES DAILY PRN
Qty: 1 BOTTLE | Refills: 3 | Status: SHIPPED | OUTPATIENT
Start: 2017-09-29 | End: 2018-02-19

## 2017-09-29 NOTE — PATIENT INSTRUCTIONS
Here is the plan from today's visit    1. Chronic lumbar radiculopathy  - diclofenac (VOLTAREN) 1 % GEL topical gel; Apply 4 grams to lower back four times daily using enclosed dosing card.  Dispense: 100 g; Refill: 11    2. Dry eyes  - carboxymethylcellulose (REFRESH PLUS) 0.5 % SOLN ophthalmic solution; Place 1 drop into both eyes 3 times daily as needed for dry eyes  Dispense: 1 Bottle; Refill: 3    3. Generalized muscle weakness  - Vitamin D Level  - CK total  - Erythrocyte Sedimentation Rate (LabDAQ)  - TSH with free T4 reflex  - schedule an appointment to get a dexa scan done at [facility]    Please call or return to clinic if your symptoms don't go away.    Follow up plan as needed     Thank you for coming to [facility] today.  Lab Testing:  **If you had lab testing today and your results are reassuring or normal they will be mailed to you or sent through [employer] within 7 days.   **If the lab tests need quick action we will call you with the results.  The phone number we will call with results is # [phone] (home) . If this is not the best number please call our clinic and change the number.  Medication Refills:  If you need any refills please call your pharmacy and they will contact us.   If you need to  your refill at a new pharmacy, please contact the new pharmacy directly. The new pharmacy will help you get your medications transferred faster.   Scheduling:  If you have any concerns about today's visit or wish to schedule another appointment please call our office during normal business hours [phone] (8-5:00 M-F)  If a referral was made to a [facility] and you don't get a call from central scheduling please call [phone].  If a Mammogram was ordered for you at [facility] call [phone] to schedule or change your appointment.  If you had an XRay/CT/Ultrasound/MRI ordered the number is [phone] to schedule or change your radiology  appointment.   Medical Concerns:  If you have urgent medical concerns please call [phone] at any time of the day.

## 2017-09-29 NOTE — NURSING NOTE
name: Iramadonna Wray  Language: Oromo  Agency: [employer]  Phone number: [phone]    Mirela Nieto

## 2017-09-29 NOTE — MR AVS SNAPSHOT
After Visit Summary   9/29/2017    Lou Winston    MRN: [id number]           Patient Information     Date Of Birth          1940        Visit Information        Provider Department      9/29/2017 3:00 PM Herminia Esteban MD [facility]        Today's Diagnoses     Generalized muscle weakness    -  1    Chronic lumbar radiculopathy        Dry eyes          Care Instructions    Here is the plan from today's visit    1. Chronic lumbar radiculopathy  - diclofenac (VOLTAREN) 1 % GEL topical gel; Apply 4 grams to lower back four times daily using enclosed dosing card.  Dispense: 100 g; Refill: 11    2. Dry eyes  - carboxymethylcellulose (REFRESH PLUS) 0.5 % SOLN ophthalmic solution; Place 1 drop into both eyes 3 times daily as needed for dry eyes  Dispense: 1 Bottle; Refill: 3    3. Generalized muscle weakness  - Vitamin D Level  - CK total  - Erythrocyte Sedimentation Rate (LabDAQ)  - TSH with free T4 reflex  - schedule an appointment to get a dexa scan done at [facility]    Please call or return to clinic if your symptoms don't go away.    Follow up plan as needed     Thank you for coming to [facility] today.  Lab Testing:  **If you had lab testing today and your results are reassuring or normal they will be mailed to you or sent through [employer] within 7 days.   **If the lab tests need quick action we will call you with the results.  The phone number we will call with results is # [phone] (home) . If this is not the best number please call our clinic and change the number.  Medication Refills:  If you need any refills please call your pharmacy and they will contact us.   If you need to  your refill at a new pharmacy, please contact the new pharmacy directly. The new pharmacy will help you get your medications transferred faster.   Scheduling:  If you have any concerns about today's visit or wish to schedule another appointment please call our office  "during normal business hours [phone] (8-5:00 M-F)  If a referral was made to a [facility] and you don't get a call from central scheduling please call [phone].  If a Mammogram was ordered for you at [facility] call [phone] to schedule or change your appointment.  If you had an XRay/CT/Ultrasound/MRI ordered the number is [phone] to schedule or change your radiology appointment.   Medical Concerns:  If you have urgent medical concerns please call [phone] at any time of the day.            Follow-ups after your visit        Additional Services     [place] [facility]ERR[facility]       *This therapy referral will be filtered to a centralized scheduling office at [facility] and the patient will receive a call to schedule an appointment at a [facility] [place] location most convenient for them. *     [facility] provides Physical Therapy evaluation and treatment and many specialty services across the [place] system.  If requesting a specialty program, please choose from the list below.    If you have not heard from the scheduling office within 2 business days, please call [phone] for all locations, with the exception of [place], please call [phone].  Treatment: Evaluation & Treatment  Special Instructions/Modalities: none  Special Programs: none, at your discretion    Please be aware that coverage of these services is subject to the terms and limitations of your health insurance plan.  Call member services at your health plan with any benefit or coverage questions.      **Note to Provider:  If you are referring outside of [facility] for the therapy appointment, please list the name of the location in the \"special instructions\" above, print the referral and give to the patient to schedule the appointment.            MASSAGE THERAPY REFERRAL       Please be aware that coverage of these services is subject to the terms and limitations of " your health insurance plan.  Call member services at your health plan with any benefit or coverage questions.      Please bring the following to your appointment:    >>   Any x-rays, CTs or MRIs which have been performed.  Contact the facility where they were done to arrange for  prior to your scheduled appointment.    >>   List of current medications   >>   This referral request   >>   Any documents/labs given to you for this referral                  Who to contact     Please call your clinic at [phone] to:    Ask questions about your health    Make or cancel appointments    Discuss your medicines    Learn about your test results    Speak to your doctor   If you have compliments or concerns about an experience at your clinic, or if you wish to file a complaint, please contact [facility] Patient Relations at [phone] or email us at [email or web address]         Additional Information About Your Visit        [employer] Information     [employer] is an electronic gateway that provides easy, online access to your medical records. With [employer], you can request a clinic appointment, read your test results, renew a prescription or communicate with your care team.     To sign up for [employer] visit the website at [email or web address]   You will be asked to enter the access code listed below, as well as some personal information. Please follow the directions to create your username and password.     Your access code is: [email or web address]  Expires: 10/23/2017  4:39 PM     Your access code will  in 90 days. If you need help or a new code, please contact your [facility] Clinic or call [phone] for assistance.        Care EveryWhere ID     This is your Care EveryWhere ID. This could be used by other organizations to access your [place] medical records  [id number]        Your Vitals Were     Pulse Temperature Respirations Pulse Oximetry BMI  (Body Mass Index)       79 98.4  F (36.9  C) (Oral) 16 98% 27.66 kg/m2        Blood Pressure from Last 3 Encounters:   09/29/17 117/79   07/25/17 122/77   05/15/17 107/69    Weight from Last 3 Encounters:   09/29/17 146 lb 6.4 oz (66.4 kg)   07/25/17 144 lb 12.8 oz (65.7 kg)   05/15/17 141 lb 3.2 oz (64 kg)              We Performed the Following     CK total     Erythrocyte Sedimentation Rate (LabDAQ)     [place] [facility] RE[facility]ERRAL     MASSAGE THERAPY REFERRAL     TSH with free T4 reflex     Vitamin D Level          Where to get your medicines      These medications were sent to [facility] [place], [place] - [address] , [facility] [address]     Phone:  [phone]     carboxymethylcellulose 0.5 % Soln ophthalmic solution    diclofenac 1 % Gel topical gel          Primary Care Provider Office Phone # Fax #    Herminia Esteban [phone]       [facility] [address]   [facility] [address]        Equal Access to Services     MAHI LYNCH : Hadchandu Rocha, waallie christensen, qaharris coffey, mary fry. So [facility] [phone].    ATENCIÓN: Si habla español, tiene a castano disposición servicios gratuitos de asistencia lingüística. [facility] [phone].    We comply with applicable federal civil rights laws and [place] laws. We do not discriminate on the basis of race, color, national origin, age, disability, sex, sexual orientation, or gender identity.            Thank you!     Thank you for choosing [facility]  for your care. Our goal is always to provide you with excellent care. Hearing back from our patients is one way we can continue to improve our services. Please take a few minutes to complete the written survey that you may receive in the mail after your visit with us. Thank you!             Your Updated Medication List - Protect others around you: Learn how to  safely use, store and throw away your medicines at www.disposemymeds.org.          This list is accurate as of: 9/29/17  3:51 PM.  Always use your most recent med list.                   Brand Name Dispense Instructions for use Diagnosis    acetaminophen 500 MG tablet    TYLENOL    100 tablet    Take 2 tablets (1,000 mg) by mouth every 8 hours as needed for mild pain    Chronic lumbar radiculopathy       carboxymethylcellulose 0.5 % Soln ophthalmic solution    REFRESH PLUS    1 Bottle    Place 1 drop into both eyes 3 times daily as needed for dry eyes    Dry eyes       diclofenac 1 % Gel topical gel    VOLTAREN    100 g    Apply 4 grams to lower back four times daily using enclosed dosing card.    Chronic lumbar radiculopathy       DULoxetine 60 MG EC capsule    CYMBALTA    120 capsule    Take 1 capsule (60 mg) by mouth 2 times daily    Major depressive disorder, recurrent episode, moderate (H), Chronic lumbar radiculopathy       gabapentin 100 MG capsule    NEURONTIN    180 capsule    TAKE TWO CAPSULES BY MOUTH AT BEDTIME    Chronic pain syndrome       ketotifen 0.025 % Soln ophthalmic solution    KP KETOTIFEN FUMARATE    1 Bottle    Place 1 drop into both eyes 2 times daily    Seasonal allergic conjunctivitis       loperamide 2 MG tablet    IMODIUM A-D    30 tablet    Take 2 tabs (4 mg) after first loose stool, and then take one tab (2 mg) after each diarrheal stool.  Max of 8 tabs (16 mg) per day.    Viral gastroenteritis       pantoprazole 40 MG EC tablet    PROTONIX    30 tablet    TAKE ONE TABLET BY MOUTH EVERY DAY 30-60 MINUTES BEFORE A MEAL    Gastroesophageal reflux disease without esophagitis       UNKNOWN TO PATIENT       Lumbago

## 2017-09-29 NOTE — PROGRESS NOTES
HPI:       Lou Winston is a 77 year old who presents for the following  Patient presents with:  RECHECK: all over body aches and chronic pain.The topical cream helps relieve pain  Refill Request: Voltaren and Refresh Plus eye drops      Lou is a 77 year old with history of chronic lumbar radiculopathy who presents for follow up on her chronic pain and with new onset fatigue and generalized body aches.    1. Chronic lumbar radiculopathy  Last seen in clinic on 7/25/17 regarding this chronic pain. Prior to that visit, last MRI had been in 2011 and showed bulging disc with facet arthropathy at L4-L5 with moderate neural foraminal stenosis. An interval MRI was scheduled after Lou's visit on 7/25 in order to evaluate progression of the lumbar stenosis. Referral was also placed for PT. Lou completed the MRI on 8/14/17 and would like to review its results today. Has not scheduled PT yet because she was waiting to get the MRI results first. Says the pain is in her low back and down the right leg and is tolerable at this point. It is unchanged since the last visit. Pain medications are helping.    2. Fatigue, body aches  Separate from the chronic pain, Lou has noticed generalized fatigue and body aches over the last month. She has aches and pains all over her body and feels it is hard for her to  things with her hands due to weakness. Does not complain of joint stiffness. No fevers/chills and no recent change in appetite. Wonders whether her vitamin D levels are low.    An ividence  was used for  this visit.      Problem, Medication and Allergy Lists were reviewed and are current.  Patient is an established patient of this clinic.         Review of Systems:   Review of Systems   Constitutional: Positive for appetite change and fatigue. Negative for chills and fever.   HENT: Negative for congestion and sore throat.    Respiratory: Negative for shortness of breath.    Cardiovascular: Negative  for chest pain and leg swelling.   Gastrointestinal: Negative for abdominal pain.   Genitourinary: Negative for dysuria and urgency.   Musculoskeletal: Positive for back pain and myalgias.   Neurological: Negative for dizziness, light-headedness and numbness.             Physical Exam:   No data found.    Body mass index is 27.66 kg/(m^2).  Vitals were reviewed and were normal     Physical Exam   Constitutional: She is oriented to person, place, and time. She appears well-developed and well-nourished. No distress.   Cardiovascular: Normal rate and regular rhythm.    Pulmonary/Chest: Effort normal and breath sounds normal. No respiratory distress.   Abdominal: Soft. She exhibits no distension. There is no tenderness.   Neurological: She is alert and oriented to person, place, and time.   Moving all extremities equally.   strength and finger abduction 4/5 bilaterally.  Knee extension and flexion 5/5 bilaterally.   Skin: Skin is warm and dry.   Psychiatric: She has a normal mood and affect.       Results:     MR Lumbar Spine w/o Contrast on 8/14/17:    IMPRESSION: I see no significant change since the previous MRI (on 9/9/2011). Again  seen is mild degenerative disc disease of the lumbar spine with no  disc herniation demonstrated. There is mild foraminal stenosis  bilaterally at both L3-L4 and L4-L5. No other area of stenosis is  seen.    Assessment and Plan     1. Chronic lumbar radiculopathy  Lou's pain is unchanged today and is tolerable on current medications. Recent MRI showed no changes since 2011 and no new stenosis or disk herniation. Reviewed these results with Lou today and encouraged her to schedule PT. Also refilled voltaren gel and put in a referral for massage therapy at her request.    - diclofenac (VOLTAREN) 1 % GEL topical gel; Apply 4 grams to lower back four times daily using enclosed dosing card.  Dispense: 100 g; Refill: 11  [facility]  - MASSAGE THERAPY REFERRAL    2. Dry  eyes  Refilled this medication today.  - carboxymethylcellulose (REFRESH PLUS) 0.5 % SOLN ophthalmic solution; Place 1 drop into both eyes 3 times daily as needed for dry eyes  Dispense: 1 Bottle; Refill: 3    3. Generalized muscle weakness  4. Chronic fatigue  Lou describes fatigue and weakness along with aches throughout her body and sometimes in her joints. She also describes a decrease in appetite, but review of vitals from past visits is reassuring for no recent weight loss. On exam,  strength was somewhat decreased in both hands, but no other signs or symptoms of neurologic abnormalities. Differential for her fatigue and body aches is broad and includes vitamin D deficiency, hypothyroidism, anemia, and possibly an inflammatory arthropathy.  Labs today to evaluate for these causes.    - CK total  - TSH with free T4 reflex  - [place] [facility] REFERRAL  - MASSAGE THERAPY REFERRAL  - Vitamin D Level; Future  - Erythrocyte Sedimentation Rate (LabDAQ); Future  - CBC with Plt (Lutsen's); Future    Medications Discontinued During This Encounter   Medication Reason     diclofenac (VOLTAREN) 1 % GEL topical gel Reorder     carboxymethylcellulose (REFRESH PLUS) 0.5 % SOLN ophthalmic solution Reorder     Options for treatment and follow-up care were reviewed with the patient. Lou Winston  engaged in the decision making process and verbalized understanding of the options discussed and agreed with the final plan.    This note is scribed by Amrita Wilkins MS3, on behalf of Dr. Esteban.    I agree with the above note scribed by Amrita Wilkins. The above assessment and plan is representative of our joined decision making and was discussed with attending Dr. Emilie Harvey. Changes were made to the above if indicated.     Herminia Esteban MD  Family Medicine PGY2

## 2017-09-29 NOTE — PROGRESS NOTES
Preceptor Attestation:  I discussed the patient with the resident. Assessment and plan reviewed with resident and agreed upon.   Supervising Physician:  Emilie Harvey MD  [facility]

## 2017-09-30 ASSESSMENT — ENCOUNTER SYMPTOMS
SHORTNESS OF BREATH: 0
FEVER: 0
DYSURIA: 0
CHILLS: 0
DIZZINESS: 0
ABDOMINAL PAIN: 0
FATIGUE: 1
SORE THROAT: 0
MYALGIAS: 1
LIGHT-HEADEDNESS: 0
BACK PAIN: 1
NUMBNESS: 0
APPETITE CHANGE: 1

## 2017-10-02 DIAGNOSIS — M62.81 GENERALIZED MUSCLE WEAKNESS: ICD-10-CM

## 2017-10-02 DIAGNOSIS — R53.82 CHRONIC FATIGUE: ICD-10-CM

## 2017-10-02 DIAGNOSIS — E55.9 VITAMIN D DEFICIENCY: Primary | ICD-10-CM

## 2017-10-02 LAB
ERYTHROCYTE [SEDIMENTATION RATE] IN BLOOD: 20 MM/HR (ref 0–30)
HCT VFR BLD AUTO: 40.8 % (ref 35–47)
HEMOGLOBIN: 13.5 G/DL (ref 11.7–15.7)
MCH RBC QN AUTO: 32.3 PG (ref 26.5–35)
MCHC RBC AUTO-ENTMCNC: 33.1 G/DL (ref 32–36)
MCV RBC AUTO: 97.7 FL (ref 78–100)
PLATELET # BLD AUTO: 248 K/UL (ref 150–450)
RBC # BLD AUTO: 4.18 M/UL (ref 3.8–5.2)
WBC # BLD AUTO: 5.5 K/UL (ref 4–11)

## 2017-10-03 LAB — DEPRECATED CALCIDIOL+CALCIFEROL SERPL-MC: 15 UG/L (ref 20–75)

## 2017-10-23 ENCOUNTER — OFFICE VISIT (OUTPATIENT)
Dept: FAMILY MEDICINE | Facility: CLINIC | Age: 77
End: 2017-10-23

## 2017-10-23 VITALS
TEMPERATURE: 98 F | SYSTOLIC BLOOD PRESSURE: 134 MMHG | OXYGEN SATURATION: 97 % | HEART RATE: 88 BPM | BODY MASS INDEX: 27.93 KG/M2 | WEIGHT: 147.8 LBS | RESPIRATION RATE: 16 BRPM | DIASTOLIC BLOOD PRESSURE: 81 MMHG

## 2017-10-23 DIAGNOSIS — M54.41 CHRONIC RIGHT-SIDED LOW BACK PAIN WITH RIGHT-SIDED SCIATICA: ICD-10-CM

## 2017-10-23 DIAGNOSIS — H61.23 BILATERAL IMPACTED CERUMEN: Primary | ICD-10-CM

## 2017-10-23 DIAGNOSIS — F33.1 MAJOR DEPRESSIVE DISORDER, RECURRENT EPISODE, MODERATE (H): ICD-10-CM

## 2017-10-23 DIAGNOSIS — G89.29 CHRONIC RIGHT-SIDED LOW BACK PAIN WITH RIGHT-SIDED SCIATICA: ICD-10-CM

## 2017-10-23 ASSESSMENT — ANXIETY QUESTIONNAIRES
5. BEING SO RESTLESS THAT IT IS HARD TO SIT STILL: SEVERAL DAYS
7. FEELING AFRAID AS IF SOMETHING AWFUL MIGHT HAPPEN: SEVERAL DAYS
2. NOT BEING ABLE TO STOP OR CONTROL WORRYING: MORE THAN HALF THE DAYS
1. FEELING NERVOUS, ANXIOUS, OR ON EDGE: MORE THAN HALF THE DAYS
6. BECOMING EASILY ANNOYED OR IRRITABLE: SEVERAL DAYS
GAD7 TOTAL SCORE: 9
IF YOU CHECKED OFF ANY PROBLEMS ON THIS QUESTIONNAIRE, HOW DIFFICULT HAVE THESE PROBLEMS MADE IT FOR YOU TO DO YOUR WORK, TAKE CARE OF THINGS AT HOME, OR GET ALONG WITH OTHER PEOPLE: SOMEWHAT DIFFICULT
3. WORRYING TOO MUCH ABOUT DIFFERENT THINGS: SEVERAL DAYS

## 2017-10-23 ASSESSMENT — ENCOUNTER SYMPTOMS
STRIDOR: 0
SHORTNESS OF BREATH: 0
RHINORRHEA: 0
NUMBNESS: 0
DIZZINESS: 1
CHILLS: 0
FEVER: 0
HEADACHES: 0

## 2017-10-23 ASSESSMENT — PATIENT HEALTH QUESTIONNAIRE - PHQ9
5. POOR APPETITE OR OVEREATING: SEVERAL DAYS
SUM OF ALL RESPONSES TO PHQ QUESTIONS 1-9: 12

## 2017-10-23 NOTE — NURSING NOTE
name: Iramadonna Wray  Language: Oromo  Agency: [employer]  Phone number: [phone]    Mirela Nieto

## 2017-10-23 NOTE — PROGRESS NOTES
HPI:       Lou Winston is a 77 year old who presents for the following  Patient presents with:    Ear Problem:  L ear pain x 15 days. Worsening. Starts in left ear that radiates to upper chest and neck. Sounds like airplane sound in both ears. No hearing problems. Has not tried anything that makes it better or worse. Rates the pain as 8/10 on a pain scale and keeping her up at night. Has never had this before. Has occasional dizziness at anytime that resolves quickly and no change with position of head.    Referral: to [facility] for her right hip pain. Pain starts in the right lower lumbar back and radiates to the right foot. Describes the pain as throbbing with associated weakness of the right leg. Desires physical therapy since it has helped in the past.     Forms: [facility] health care summary form    A Ecuadorean  was used for this visit.      Problem, Medication and Allergy Lists were reviewed and are current.  Patient is   an established patient of this clinic.,   Past Medical History:   Diagnosis Date     Depressive disorder    ,   Family History     *Family history is unknown by patient.       and   Social History     Social History     Marital status:      Spouse name: N/A     Number of children: N/A     Years of education: N/A     Social History Main Topics     Smoking status: Never Smoker     Smokeless tobacco: Never Used     Alcohol use No     Drug use: No     Sexual activity: Not Asked     Other Topics Concern     None     Social History Narrative            Review of Systems:   Review of Systems   Constitutional: Negative for chills and fever.   HENT: Positive for ear pain. Negative for congestion and rhinorrhea.    Eyes: Negative for visual disturbance.   Respiratory: Negative for shortness of breath and stridor.    Skin: Negative for rash.   Neurological: Positive for dizziness. Negative for numbness and headaches.              Physical Exam:   Patient Vitals for the past 24 hrs:   BP Temp Temp src Pulse Resp SpO2 Weight   10/23/17 1513 134/81 98  F (36.7  C) Oral 88 16 97 % 147 lb 12.8 oz (67 kg)     Body mass index is 27.93 kg/(m^2).  Vitals were reviewed and were normal     Physical Exam   Constitutional: She appears well-developed and well-nourished. No distress.   HENT:   Head: Normocephalic and atraumatic.   Nose: Nose normal.   Mouth/Throat: Uvula is midline, oropharynx is clear and moist and mucous membranes are normal.   Cerumen impaction of bilateral ears with peripheral rim of upper left TM appears normal   Eyes: Conjunctivae are normal. No scleral icterus.   Cardiovascular: Normal rate, regular rhythm and normal heart sounds.    No murmur heard.  Pulmonary/Chest: Effort normal and breath sounds normal. She has no wheezes.   Abdominal: Soft. Bowel sounds are normal. She exhibits no distension. There is no tenderness.   Musculoskeletal:        Thoracic back: She exhibits no tenderness and no bony tenderness.        Lumbar back: She exhibits no tenderness and no bony tenderness.   Neurological: She has normal strength and normal reflexes.   Skin: Skin is warm and dry. No rash noted.   Psychiatric: She has a normal mood and affect. Her behavior is normal.         Results:      Results from the last 24 hoursNo results found for this or any previous visit (from the past 24 hour(s)).  Assessment and Plan   Lou was seen today for ear problem, referral and forms.    Diagnoses and all orders for this visit:    Bilateral impacted cerumen  Patient declined ear wax irrigation and desires to follow up next week due to time constraints. Advised her to use debrox BID until follow up.   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   PHQ-9 SCORE 5/1/2017 5/15/2017 10/23/2017   Total Score - - -   Total Score - - -   Total Score 6 12 12     YOUSUF-7 SCORE 4/6/2016 5/15/2017 10/23/2017   Total Score - - -   Total Score 9 8 9   Total Score BEH Adult - - -       There are no discontinued medications.  Options for treatment and follow-up care were reviewed with the patient. Lou Winston  engaged in the decision making process and verbalized understanding of the options discussed and agreed with the final plan.    Phuong Palma MD

## 2017-10-23 NOTE — MR AVS SNAPSHOT
After Visit Summary   10/23/2017    Lou Winston    MRN: [id number]           Patient Information     Date Of Birth          1940        Visit Information        Provider Department      10/23/2017 3:00 PM Jak Guardado MD [facility]        Today's Diagnoses     Bilateral impacted cerumen    -  1    Chronic right-sided low back pain with right-sided sciatica          Care Instructions    Here is the plan from today's visit    1. Bilateral impacted cerumen  - carbamide peroxide (DEBROX) 6.5 % otic solution; Place 5-10 drops into both ears 2 times daily  Dispense: 30 mL; Refill: 0    2. Chronic right-sided low back pain with right-sided sciatica  - PHYSICAL THERAPY REFERRAL - EXTERNAL    Please call or return to clinic if your symptoms don't go away.    Follow up plan  Please make a clinic appointment for follow up with me (JAK GUARDADO) in one week.      Thank you for coming to [facility] today.  Lab Testing:  **If you had lab testing today and your results are reassuring or normal they will be mailed to you or sent through [employer] within 7 days.   **If the lab tests need quick action we will call you with the results.  The phone number we will call with results is # [phone] (home) . If this is not the best number please call our clinic and change the number.  Medication Refills:  If you need any refills please call your pharmacy and they will contact us.   If you need to  your refill at a new pharmacy, please contact the new pharmacy directly. The new pharmacy will help you get your medications transferred faster.   Scheduling:  If you have any concerns about today's visit or wish to schedule another appointment please call our office during normal business hours [phone] (8-5:00 M-F)  If a referral was made to a [facility] and you don't get a call from central scheduling please call [phone].  If a Mammogram was  ordered for you at [facility] call [phone] to schedule or change your appointment.  If you had an XRay/CT/Ultrasound/MRI ordered the number is [phone] to schedule or change your radiology appointment.   Medical Concerns:  If you have urgent medical concerns please call [phone] at any time of the day.            Follow-ups after your visit        Additional Services     PHYSICAL THERAPY REFERRAL - EXTERNAL       [facility]  [address]   [phone]    [facility]   [address]  [facility], [place]  [phone]        At this location [facility]                  Who to contact     Please call your clinic at [phone] to:    Ask questions about your health    Make or cancel appointments    Discuss your medicines    Learn about your test results    Speak to your doctor   If you have compliments or concerns about an experience at your clinic, or if you wish to file a complaint, please contact [facility] Patient Relations at [phone] or email us at [email or web address]         Additional Information About Your Visit        Freedom[employer] Information     Fre[employer] is an electronic gateway that provides easy, online access to your medical records. With Freedom[employer], you can request a clinic appointment, read your test results, renew a prescription or communicate with your care team.     To sign up for [employer] visit the website at [email or web address]   You will be asked to enter the access code listed below, as well as some personal information. Please follow the directions to create your username and password.     Your access code is: [email or web address]  Expires: 10/23/2017  4:39 PM     Your access code will  in 90 days. If you need help or a new code, please contact your [facility] or call [phone] for assistance.        Care EveryWhere ID     This is your Care EveryWhere ID. This could be used by other organizations to  access your [facility] medical records  [id number]        Your Vitals Were     Pulse Temperature Respirations Pulse Oximetry BMI (Body Mass Index)       88 98  F (36.7  C) (Oral) 16 97% 27.93 kg/m2        Blood Pressure from Last 3 Encounters:   10/23/17 134/81   09/29/17 117/79   07/25/17 122/77    Weight from Last 3 Encounters:   10/23/17 147 lb 12.8 oz (67 kg)   09/29/17 146 lb 6.4 oz (66.4 kg)   07/25/17 144 lb 12.8 oz (65.7 kg)              We Performed the Following     PHYSICAL THERAPY REFERRAL - EXTERNAL          Today's Medication Changes          These changes are accurate as of: 10/23/17  4:04 PM.  If you have any questions, ask your nurse or doctor.               Start taking these medicines.        Dose/Directions    carbamide peroxide 6.5 % otic solution   Commonly known as:  DEBROX   Used for:  Bilateral impacted cerumen   Started by:  Phuong Palma MD        Dose:  5-10 drop   Place 5-10 drops into both ears 2 times daily   Quantity:  30 mL   Refills:  0            Where to get your medicines      These medications were sent to [facility] [place], [place] - [address] [place] [id number]     Phone:  [phone]     carbamide peroxide 6.5 % otic solution                Primary Care Provider Office Phone # Fax #    Herminia MD Carlito [phone]       [facility] [address]  [facility] [address]        Equal Access to Services     MAHI LYNCH AH: Hadii herman ku hadasho Soomaali, waaxda luqadaha, qaybta kaalmada adeegyada, waxay neri fry. So [facility] [phone].    ATENCIÓN: Si habla español, tiene a castano disposición servicios gratuitos de asistencia lingüística. Llame al [phone].    We comply with applicable federal civil rights laws and [place] laws. We do not discriminate on the basis of race, color, national origin, age, disability, sex, sexual orientation, or gender identity.             Thank you!     Thank you for choosing [facility]  for your care. Our goal is always to provide you with excellent care. Hearing back from our patients is one way we can continue to improve our services. Please take a few minutes to complete the written survey that you may receive in the mail after your visit with us. Thank you!             Your Updated Medication List - Protect others around you: Learn how to safely use, store and throw away your medicines at www.disposemymeds.org.          This list is accurate as of: 10/23/17  4:04 PM.  Always use your most recent med list.                   Brand Name Dispense Instructions for use Diagnosis    acetaminophen 500 MG tablet    TYLENOL    100 tablet    Take 2 tablets (1,000 mg) by mouth every 8 hours as needed for mild pain    Chronic lumbar radiculopathy       carbamide peroxide 6.5 % otic solution    DEBROX    30 mL    Place 5-10 drops into both ears 2 times daily    Bilateral impacted cerumen       carboxymethylcellulose 0.5 % Soln ophthalmic solution    REFRESH PLUS    1 Bottle    Place 1 drop into both eyes 3 times daily as needed for dry eyes    Dry eyes       cholecalciferol 1000 UNIT tablet    vitamin D3    100 tablet    Take 1 tablet (1,000 Units) by mouth daily    Vitamin D deficiency       diclofenac 1 % Gel topical gel    VOLTAREN    100 g    Apply 4 grams to lower back four times daily using enclosed dosing card.    Chronic lumbar radiculopathy       DULoxetine 60 MG EC capsule    CYMBALTA    120 capsule    Take 1 capsule (60 mg) by mouth 2 times daily    Major depressive disorder, recurrent episode, moderate (H), Chronic lumbar radiculopathy       gabapentin 100 MG capsule    NEURONTIN    180 capsule    TAKE TWO CAPSULES BY MOUTH AT BEDTIME    Chronic pain syndrome       ketotifen 0.025 % Soln ophthalmic solution    KP KETOTIFEN FUMARATE    1 Bottle    Place 1 drop into both eyes 2 times daily    Seasonal allergic conjunctivitis        loperamide 2 MG tablet    IMODIUM A-D    30 tablet    Take 2 tabs (4 mg) after first loose stool, and then take one tab (2 mg) after each diarrheal stool.  Max of 8 tabs (16 mg) per day.    Viral gastroenteritis       pantoprazole 40 MG EC tablet    PROTONIX    30 tablet    TAKE ONE TABLET BY MOUTH EVERY DAY 30-60 MINUTES BEFORE A MEAL    Gastroesophageal reflux disease without esophagitis       UNKNOWN TO PATIENT       Stephen

## 2017-10-23 NOTE — PROGRESS NOTES
Preceptor Attestation:   Patient seen and discussed with the resident. Assessment and plan reviewed with resident and agreed upon.   Supervising Physician:  Yonny Ibanez MD  [facility]

## 2017-10-23 NOTE — PATIENT INSTRUCTIONS
Here is the plan from today's visit    1. Bilateral impacted cerumen  - carbamide peroxide (DEBROX) 6.5 % otic solution; Place 5-10 drops into both ears 2 times daily  Dispense: 30 mL; Refill: 0    2. Chronic right-sided low back pain with right-sided sciatica  - PHYSICAL THERAPY REFERRAL - EXTERNAL    Please call or return to clinic if your symptoms don't go away.    Follow up plan  Please make a clinic appointment for follow up with me (JAK GUARDADO) in one week.      Thank you for coming to [facility] today.  Lab Testing:  **If you had lab testing today and your results are reassuring or normal they will be mailed to you or sent through [employer] within 7 days.   **If the lab tests need quick action we will call you with the results.  The phone number we will call with results is # [phone] (home) . If this is not the best number please call our clinic and change the number.  Medication Refills:  If you need any refills please call your pharmacy and they will contact us.   If you need to  your refill at a new pharmacy, please contact the new pharmacy directly. The new pharmacy will help you get your medications transferred faster.   Scheduling:  If you have any concerns about today's visit or wish to schedule another appointment please call our office during normal business hours [phone] (8-5:00 M-F)  If a referral was made to a [facility] and you don't get a call from central scheduling please call [phone].  If a Mammogram was ordered for you at [facility] call [phone] to schedule or change your appointment.  If you had an XRay/CT/Ultrasound/MRI ordered the number is [phone] to schedule or change your radiology appointment.   Medical Concerns:  If you have urgent medical concerns please call [phone] at any time of the day.

## 2017-10-24 ASSESSMENT — ANXIETY QUESTIONNAIRES: GAD7 TOTAL SCORE: 9

## 2017-10-31 ENCOUNTER — OFFICE VISIT (OUTPATIENT)
Dept: FAMILY MEDICINE | Facility: CLINIC | Age: 77
End: 2017-10-31

## 2017-10-31 VITALS
SYSTOLIC BLOOD PRESSURE: 138 MMHG | HEART RATE: 82 BPM | BODY MASS INDEX: 27.78 KG/M2 | TEMPERATURE: 98 F | RESPIRATION RATE: 20 BRPM | DIASTOLIC BLOOD PRESSURE: 83 MMHG | WEIGHT: 147 LBS | OXYGEN SATURATION: 98 %

## 2017-10-31 DIAGNOSIS — H93.12 TINNITUS OF LEFT EAR: Primary | ICD-10-CM

## 2017-10-31 ASSESSMENT — ENCOUNTER SYMPTOMS
FEVER: 0
COUGH: 0
CHILLS: 0
SORE THROAT: 0

## 2017-10-31 NOTE — PROGRESS NOTES
Preceptor Attestation:   Patient seen and discussed with the resident. Assessment and plan reviewed with resident and agreed upon.   Supervising Physician:  Tiffany Cabrera MD  [facility]

## 2017-10-31 NOTE — PROGRESS NOTES
HPI:       Lou Winston is a 77 year old who presents for the following  Patient presents with:  Otalgia: follow up    Left ear pain with associated symptoms of tinnitus of the left ear with whooshing noise, vertigo, and headache on the left side for the past 3 weeks. Her symptoms are unchanged since last week and come and go. She has been using debrox ear drops twice a day with burning. The ear pain occurs 2-3 times a day and worse during the night that lasts an hour at the most.     Problem, Medication and Allergy Lists were reviewed and are current.  Patient is an established patient of this clinic.         Review of Systems:   Review of Systems   Constitutional: Negative for chills and fever.   HENT: Positive for ear pain and tinnitus. Negative for congestion, hearing loss, rhinorrhea and sore throat.    Respiratory: Negative for cough.    Cardiovascular: Negative for chest pain.   Musculoskeletal: Positive for neck pain.   Neurological: Positive for headaches. Negative for dizziness, syncope, speech difficulty and weakness.             Physical Exam:     Patient Vitals for the past 24 hrs:   BP Temp Temp src Pulse Resp SpO2 Weight   10/31/17 1617 138/83 98  F (36.7  C) Oral 82 20 98 % 147 lb (66.7 kg)     Body mass index is 27.78 kg/(m^2).  Vitals were reviewed and were normal     Physical Exam   Constitutional: She appears well-developed and well-nourished. No distress.   HENT:   Head: Normocephalic and atraumatic.   Right Ear: Tympanic membrane, external ear and ear canal normal.   Left Ear: Tympanic membrane, external ear and ear canal normal.   Nose: Nose normal.   Mouth/Throat: Uvula is midline, oropharynx is clear and moist and mucous membranes are normal. No oropharyngeal exudate.   Eyes: Conjunctivae are normal. Pupils are equal, round, and reactive to light. No scleral icterus.   Neck: Neck supple. No thyromegaly present.   Cardiovascular: Normal rate, regular rhythm and normal heart sounds.     No murmur heard.  Pulmonary/Chest: Effort normal and breath sounds normal. She has no wheezes.   Lymphadenopathy:     She has no cervical adenopathy.   Neurological: She is alert. She has normal strength. No cranial nerve deficit.   No nystagmus or change in symptoms with [place]-Hallpike maneuver    Skin: Skin is warm and dry. No rash noted.   Psychiatric: She has a normal mood and affect. Her behavior is normal.         Results:      Results from the last 24 hoursNo results found for this or any previous visit (from the past 24 hour(s)).  Assessment and Plan     Lou was seen today for otalgia.    Diagnoses and all orders for this visit:    Tinnitus of left ear  New onset of left ear pain with associated unilateral tinnitus, headache, and vertigo x 3 weeks. Bilateral cerumen impaction resolved with debrox ear drops and ear wax irrigation in clinic. TM's bilaterally appear WNL. Differential diagnosis includes presbycusis vs vascular insufficiency or malformations vs otosclerosis vs acoustic neuroma. ENT referral for further evaluation and management given unilateral tinnitus and no improvement with cerumen removal.   -     OTOLARYNGOLOGY REFERRAL - INTERNAL    There are no discontinued medications.  Options for treatment and follow-up care were reviewed with the patient. Lou SILKE Catrachito  engaged in the decision making process and verbalized understanding of the options discussed and agreed with the final plan.    Phuong Palma MD

## 2017-10-31 NOTE — PATIENT INSTRUCTIONS
Here is the plan from today's visit    1. Tinnitus of left ear  - OTOLARYNGOLOGY REFERRAL - INTERNAL    Thank you for coming to [facility] today.  Lab Testing:  **If you had lab testing today and your results are reassuring or normal they will be mailed to you or sent through [employer] within 7 days.   **If the lab tests need quick action we will call you with the results.  The phone number we will call with results is # [phone] (home) . If this is not the best number please call our clinic and change the number.  Medication Refills:  If you need any refills please call your pharmacy and they will contact us.   If you need to  your refill at a new pharmacy, please contact the new pharmacy directly. The new pharmacy will help you get your medications transferred faster.   Scheduling:  If you have any concerns about today's visit or wish to schedule another appointment please call our office during normal business hours [phone] (8-5:00 M-F)  If a referral was made to a [facility] and you don't get a call from central scheduling please call [phone].  If a Mammogram was ordered for you at [facility] call [phone] to schedule or change your appointment.  If you had an XRay/CT/Ultrasound/MRI ordered the number is [phone] to schedule or change your radiology appointment.   Medical Concerns:  If you have urgent medical concerns please call [phone] at any time of the day.

## 2017-11-01 ASSESSMENT — ENCOUNTER SYMPTOMS
SPEECH DIFFICULTY: 0
HEADACHES: 1
DIZZINESS: 0
WEAKNESS: 0
NECK PAIN: 1
RHINORRHEA: 0

## 2018-02-19 ENCOUNTER — OFFICE VISIT (OUTPATIENT)
Dept: FAMILY MEDICINE | Facility: CLINIC | Age: 78
End: 2018-02-19
Payer: COMMERCIAL

## 2018-02-19 VITALS
OXYGEN SATURATION: 98 % | SYSTOLIC BLOOD PRESSURE: 117 MMHG | BODY MASS INDEX: 27.96 KG/M2 | WEIGHT: 148 LBS | DIASTOLIC BLOOD PRESSURE: 71 MMHG | RESPIRATION RATE: 18 BRPM | TEMPERATURE: 97.8 F | HEART RATE: 77 BPM

## 2018-02-19 DIAGNOSIS — K21.9 GASTROESOPHAGEAL REFLUX DISEASE WITHOUT ESOPHAGITIS: ICD-10-CM

## 2018-02-19 DIAGNOSIS — E55.9 VITAMIN D DEFICIENCY: ICD-10-CM

## 2018-02-19 DIAGNOSIS — H04.123 DRY EYES: ICD-10-CM

## 2018-02-19 DIAGNOSIS — G89.4 CHRONIC PAIN SYNDROME: ICD-10-CM

## 2018-02-19 DIAGNOSIS — M54.16 CHRONIC LUMBAR RADICULOPATHY: ICD-10-CM

## 2018-02-19 DIAGNOSIS — J30.2 CHRONIC SEASONAL ALLERGIC RHINITIS DUE TO OTHER ALLERGEN: Primary | ICD-10-CM

## 2018-02-19 DIAGNOSIS — Z23 IMMUNIZATION DUE: ICD-10-CM

## 2018-02-19 DIAGNOSIS — R41.3 MEMORY IMPAIRMENT: ICD-10-CM

## 2018-02-19 RX ORDER — CETIRIZINE HYDROCHLORIDE 10 MG/1
10 TABLET ORAL EVERY EVENING
Qty: 30 TABLET | Refills: 1 | Status: SHIPPED | OUTPATIENT
Start: 2018-02-19 | End: 2018-03-30

## 2018-02-19 RX ORDER — PANTOPRAZOLE SODIUM 40 MG/1
TABLET, DELAYED RELEASE ORAL
Qty: 30 TABLET | Refills: 11 | Status: SHIPPED | OUTPATIENT
Start: 2018-02-19 | End: 2018-05-25

## 2018-02-19 RX ORDER — ACETAMINOPHEN 500 MG
1000 TABLET ORAL EVERY 8 HOURS PRN
Qty: 100 TABLET | Refills: 11 | Status: SHIPPED | OUTPATIENT
Start: 2018-02-19 | End: 2019-09-11

## 2018-02-19 RX ORDER — CARBOXYMETHYLCELLULOSE SODIUM 5 MG/ML
1 SOLUTION/ DROPS OPHTHALMIC 3 TIMES DAILY PRN
Qty: 1 BOTTLE | Refills: 3 | Status: SHIPPED | OUTPATIENT
Start: 2018-02-19 | End: 2019-01-10

## 2018-02-19 RX ORDER — GABAPENTIN 100 MG/1
CAPSULE ORAL
Qty: 180 CAPSULE | Refills: 1 | Status: SHIPPED | OUTPATIENT
Start: 2018-02-19 | End: 2019-01-10

## 2018-02-19 ASSESSMENT — ENCOUNTER SYMPTOMS
RHINORRHEA: 0
ACTIVITY CHANGE: 0
SLEEP DISTURBANCE: 0
BACK PAIN: 1
CHILLS: 0
FEVER: 0
EYES NEGATIVE: 1
SORE THROAT: 0
MYALGIAS: 1
GASTROINTESTINAL NEGATIVE: 1
SINUS PRESSURE: 1
FATIGUE: 0
NERVOUS/ANXIOUS: 0
APPETITE CHANGE: 0
AGITATION: 0
SINUS PAIN: 1
CARDIOVASCULAR NEGATIVE: 1
CONFUSION: 1
UNEXPECTED WEIGHT CHANGE: 0

## 2018-02-19 ASSESSMENT — ANXIETY QUESTIONNAIRES
7. FEELING AFRAID AS IF SOMETHING AWFUL MIGHT HAPPEN: MORE THAN HALF THE DAYS
5. BEING SO RESTLESS THAT IT IS HARD TO SIT STILL: SEVERAL DAYS
GAD7 TOTAL SCORE: 9
1. FEELING NERVOUS, ANXIOUS, OR ON EDGE: MORE THAN HALF THE DAYS
3. WORRYING TOO MUCH ABOUT DIFFERENT THINGS: SEVERAL DAYS
6. BECOMING EASILY ANNOYED OR IRRITABLE: SEVERAL DAYS
2. NOT BEING ABLE TO STOP OR CONTROL WORRYING: SEVERAL DAYS
IF YOU CHECKED OFF ANY PROBLEMS ON THIS QUESTIONNAIRE, HOW DIFFICULT HAVE THESE PROBLEMS MADE IT FOR YOU TO DO YOUR WORK, TAKE CARE OF THINGS AT HOME, OR GET ALONG WITH OTHER PEOPLE: SOMEWHAT DIFFICULT

## 2018-02-19 ASSESSMENT — PATIENT HEALTH QUESTIONNAIRE - PHQ9: 5. POOR APPETITE OR OVEREATING: SEVERAL DAYS

## 2018-02-19 NOTE — NURSING NOTE
"Injectable Influenza Immunization Documentation    1.  Has the patient received the information for the injectable influenza vaccine? YES     2. Is the patient 6 months of age or older? YES     3. Does the patient have any of the following contraindications?         Severe allergy to eggs? No     Severe allergic reaction to previous influenza vaccines? No   Severe allergy to latex? No       History of Guillain-Pitkin syndrome? No     Currently have a temperature greater than 100.4F? No        4.  Severely egg allergic patients should have flu vaccine eligibility assessed by an MD, RN, or pharmacist, and those who received flu vaccine should be observed for 15 min by an MD, RN, Pharmacist, Medical Technician, or member of clinic staff.\": YES    5. Latex-allergic patients should be given latex-free influenza vaccine Yes. Please reference the Vaccine latex table to determine if your clinic s product is latex-containing.       Vaccination given by JUS Webb        "

## 2018-02-19 NOTE — PATIENT INSTRUCTIONS
"Here is the plan from today's visit:    What Is OMT (Osteopathic Manipulative Treatment)?    As part of their education, DOs (doctor of osteopathy) receive special training in the musculoskeletal system (your nerves, bones and muscles).     OMT involves using the hands to diagnose, treat and prevent illness or injury.  Using OMT, your osteopathic physician will move your muscles and joints using techniques including stretching, gentle pressure and resistance.     OMT can help people of all ages and backgrounds. In addition to muscle or joint pain, it can be used to treat a variety of conditions such as asthma, sinus pain, migraines and carpal tunnel syndrome.     For more information, please visit [email or web address]    How to Schedule OMT :  Please make an appointment for \"OMT\" with the .  Please inform them you are scheduling for OMT with any DO provider. A list is provided below:     Halina Mariee, DO Carolina Stanley, DO Josse Carolina, DO Phuong Palma, DO Jo Ann Arciniega, DO Juan José Murray, DO Carolann Haynes,       1. Chronic lumbar radiculopathy  - acetaminophen (TYLENOL) 500 MG tablet; Take 2 tablets (1,000 mg) by mouth every 8 hours as needed for mild pain  Dispense: 100 tablet; Refill: 11  - diclofenac (VOLTAREN) 1 % GEL topical gel; Apply 4 grams to lower back four times daily using enclosed dosing card.  Dispense: 100 g; Refill: 11    2. Chronic pain syndrome  - gabapentin (NEURONTIN) 100 MG capsule; TAKE TWO CAPSULES BY MOUTH AT BEDTIME  Dispense: 180 capsule; Refill: 1    3. Dry eyes  - carboxymethylcellulose (REFRESH PLUS) 0.5 % SOLN ophthalmic solution; Place 1 drop into both eyes 3 times daily as needed for dry eyes  Dispense: 1 Bottle; Refill: 3    4. Vitamin D deficiency  - cholecalciferol (VITAMIN D3) 1000 UNIT tablet; Take 1 tablet (1,000 Units) by mouth daily  Dispense: 100 tablet; Refill: 3    5. Gastroesophageal reflux disease without esophagitis  - pantoprazole (PROTONIX) 40 MG EC tablet; TAKE " ONE TABLET BY MOUTH EVERY DAY 30-60 MINUTES BEFORE A MEAL  Dispense: 30 tablet; Refill: 11    6 Chronic seasonal allergies:  Use nasal spray and Zyrtec to see if this helps.     Please call or return to clinic if your symptoms don't go away.    Follow up plan  Follow up if you are not improving in the next 1-2 weeks.    Thank you for coming to [facility] today.  Lab Testing:  **If you had lab testing today and your results are reassuring or normal they will be mailed to you or sent through [employer] within 7 days.   **If the lab tests need quick action we will call you with the results.  The phone number we will call with results is # [phone] (home) . If this is not the best number please call our clinic and change the number.  Medication Refills:  If you need any refills please call your pharmacy and they will contact us.   If you need to  your refill at a new pharmacy, please contact the new pharmacy directly. The new pharmacy will help you get your medications transferred faster.   Scheduling:  If you have any concerns about today's visit or wish to schedule another appointment please call our office during normal business hours [phone] (8-5:00 M-F)  If a referral was made to a [facility] and you don't get a call from central scheduling please call [phone].  If a Mammogram was ordered for you at [facility] call [phone] to schedule or change your appointment.  If you had an XRay/CT/Ultrasound/MRI ordered the number is [phone] to schedule or change your radiology appointment.   Medical Concerns:  If you have urgent medical concerns please call [phone] at any time of the day.    Ernesto Esteban MD

## 2018-02-19 NOTE — PROGRESS NOTES
HPI:       Lou Winston is a 78 year old who presents for the following  Patient presents with:  Nasal Congestion: very congested in the morning with some facial swelling    1) Sinus congestion: Worse at night and early morning. Facial pressure. Occurs always in the cold weather. No fever. No cough. No sore throat.   2) General body aches: Present for 1 month. Worst at nighttime. Really described as stiffness. Has a hard time getting out of bed in the morning.   3) Back pain: Finished physical therapy. Didn't really provide much relief. States pain is worse currently due to the cold weather so she is not surprised that it didn't help much. Gabapentin and diclofenac gel provide good relief.   4) In reviewing patient's PHQ9, she tells me she has had many issues with memory which result in her poor mood and concentration scores. States this waxes and wanes. Sometimes forgets to turn on/off appliances, put on shoes, put on winter coat to go outside. This has been stable for several years. She lives with her adult daughter who helps to care for her. Per patient's , patient goes to psychiatry at [facility] for this.     An Chelaile  was used for  this visit.      Problem, Medication and Allergy Lists were reviewed and are current.  Patient is an established patient of this clinic.         Review of Systems:   Review of Systems   Constitutional: Negative for activity change, appetite change, chills, fatigue, fever and unexpected weight change.   HENT: Positive for congestion, sinus pain and sinus pressure. Negative for postnasal drip, rhinorrhea and sore throat.    Eyes: Negative.    Cardiovascular: Negative.    Gastrointestinal: Negative.    Musculoskeletal: Positive for back pain and myalgias. Negative for gait problem.   Psychiatric/Behavioral: Positive for confusion (chronic, stable per patient. ). Negative for agitation, behavioral problems, sleep disturbance and suicidal ideas. The patient is  not nervous/anxious.              Physical Exam:     Patient Vitals for the past 24 hrs:   BP Temp Temp src Pulse Resp SpO2 Weight   02/19/18 1326 117/71 97.8  F (36.6  C) Oral 77 18 98 % 148 lb (67.1 kg)     Body mass index is 27.96 kg/(m^2).  Vitals were reviewed and were normal      Physical Exam   Constitutional: She is oriented to person, place, and time. She appears well-developed and well-nourished. No distress.   HENT:   Head: Normocephalic and atraumatic.   Right Ear: External ear normal.   Left Ear: External ear normal.   Nose: Nose normal.   Mouth/Throat: Oropharynx is clear and moist. No oropharyngeal exudate.   TM clear bilaterally. + tenderness to palpation of maxillary and frontal sinuses bilaterally.    Eyes: Conjunctivae are normal.   Neck: Normal range of motion. Neck supple. No thyromegaly present.   Cardiovascular: Normal rate, regular rhythm and normal heart sounds.    No murmur heard.  Pulmonary/Chest: Effort normal and breath sounds normal. No respiratory distress.   Musculoskeletal: She exhibits no edema.   Tenderness and hypertonicity of R trapezius and R lumbar paraspinal muscles.    Lymphadenopathy:     She has no cervical adenopathy.   Neurological: She is alert and oriented to person, place, and time.   Skin: Skin is warm and dry.   Psychiatric: She has a normal mood and affect. Her behavior is normal. Judgment and thought content normal.         Results:      Results from the last 24 hoursNo results found for this or any previous visit (from the past 24 hour(s)).  Assessment and Plan     Lou was seen today for nasal congestion.    Diagnoses and all orders for this visit:    Chronic seasonal allergic rhinitis due to other allergen  Given seasonality of symptoms, likely allergies. Tx with zyrtect and nasal spray.   -     cetirizine (ZYRTEC) 10 MG tablet; Take 1 tablet (10 mg) by mouth every evening  -     sodium chloride (OCEAN) 0.65 % nasal spray; Spray 1 spray into both nostrils  daily as needed for congestion    Chronic lumbar radiculopathy  Continue Tylenol, gabapentin, voltaren get. May try OMT if desired.   -     acetaminophen (TYLENOL) 500 MG tablet; Take 2 tablets (1,000 mg) by mouth every 8 hours as needed for mild pain  -     diclofenac (VOLTAREN) 1 % GEL topical gel; Apply 4 grams to lower back four times daily using enclosed dosing card.    Chronic pain syndrome  -     gabapentin (NEURONTIN) 100 MG capsule; TAKE TWO CAPSULES BY MOUTH AT BEDTIME    Dry eyes  -     carboxymethylcellulose (REFRESH PLUS) 0.5 % SOLN ophthalmic solution; Place 1 drop into both eyes 3 times daily as needed for dry eyes    Vitamin D deficiency  -     cholecalciferol (VITAMIN D3) 1000 UNIT tablet; Take 1 tablet (1,000 Units) by mouth daily    Gastroesophageal reflux disease without esophagitis  -     pantoprazole (PROTONIX) 40 MG EC tablet; TAKE ONE TABLET BY MOUTH EVERY DAY 30-60 MINUTES BEFORE A MEAL    Memory impairment  Discussed briefly with patient. She tells me she has already had bloodwork for this. Plan to return in 2 weeks to reassess sinus symptoms. Will discuss memory issues in further detail at that time and have patient complete FEMRIN for careeverywhere.       There are no discontinued medications.  Options for treatment and follow-up care were reviewed with the patient. Lou Winston  engaged in the decision making process and verbalized understanding of the options discussed and agreed with the final plan.    Halina Mariee DO

## 2018-02-20 ASSESSMENT — ANXIETY QUESTIONNAIRES: GAD7 TOTAL SCORE: 9

## 2018-03-30 ENCOUNTER — OFFICE VISIT (OUTPATIENT)
Dept: FAMILY MEDICINE | Facility: CLINIC | Age: 78
End: 2018-03-30
Payer: COMMERCIAL

## 2018-03-30 VITALS
WEIGHT: 149 LBS | RESPIRATION RATE: 16 BRPM | DIASTOLIC BLOOD PRESSURE: 83 MMHG | HEART RATE: 80 BPM | OXYGEN SATURATION: 99 % | SYSTOLIC BLOOD PRESSURE: 137 MMHG | BODY MASS INDEX: 28.15 KG/M2

## 2018-03-30 DIAGNOSIS — R73.03 PREDIABETES: ICD-10-CM

## 2018-03-30 DIAGNOSIS — J30.2 CHRONIC SEASONAL ALLERGIC RHINITIS DUE TO OTHER ALLERGEN: ICD-10-CM

## 2018-03-30 DIAGNOSIS — R42 VERTIGO: Primary | ICD-10-CM

## 2018-03-30 DIAGNOSIS — Z12.11 SPECIAL SCREENING FOR MALIGNANT NEOPLASMS, COLON: ICD-10-CM

## 2018-03-30 LAB — HBA1C MFR BLD: 6.2 % (ref 4.1–5.7)

## 2018-03-30 RX ORDER — MECLIZINE HYDROCHLORIDE 25 MG/1
25 TABLET ORAL 3 TIMES DAILY PRN
Qty: 30 TABLET | Refills: 1 | Status: SHIPPED | OUTPATIENT
Start: 2018-03-30 | End: 2018-10-10

## 2018-03-30 ASSESSMENT — ENCOUNTER SYMPTOMS
RHINORRHEA: 0
SINUS PAIN: 1
WEAKNESS: 0
HEADACHES: 0
DIZZINESS: 1
SINUS PRESSURE: 1
FACIAL ASYMMETRY: 0
CONSTITUTIONAL NEGATIVE: 1
NERVOUS/ANXIOUS: 1

## 2018-03-30 NOTE — MR AVS SNAPSHOT
After Visit Summary   3/30/2018    Lou Winston    MRN: [id number]           Patient Information     Date Of Birth          1940        Visit Information        Provider Department      3/30/2018 2:20 PM Halina Mariee DO [facility]        Today's Diagnoses     Vertigo    -  1    Prediabetes          Care Instructions    Here is the plan from today's visit    1. Vertigo  - meclizine (ANTIVERT) 25 MG tablet; Take 1 tablet (25 mg) by mouth 3 times daily as needed for dizziness  Dispense: 30 tablet; Refill: 1    2. Prediabetes  - Hemoglobin A1c ([facility])    Please call or return to clinic if your symptoms don't go away.    Follow up plan  Please make a clinic appointment for follow up with your primary physician Ernesto Esteban MD in 1 month for follow-up.    Thank you for coming to [facility] today.  Lab Testing:  **If you had lab testing today and your results are reassuring or normal they will be mailed to you or sent through [employer] within 7 days.   **If the lab tests need quick action we will call you with the results.  The phone number we will call with results is # [phone] (home) . If this is not the best number please call our clinic and change the number.  Medication Refills:  If you need any refills please call your pharmacy and they will contact us.   If you need to  your refill at a new pharmacy, please contact the new pharmacy directly. The new pharmacy will help you get your medications transferred faster.   Scheduling:  If you have any concerns about today's visit or wish to schedule another appointment please call our office during normal business hours [phone] (8-5:00 M-F)  If a referral was made to a [facility] and you don't get a call from central scheduling please call [phone].  If a Mammogram was ordered for you at [facility] call [phone] to schedule or change your appointment.  If you had an  XRay/CT/Ultrasound/MRI ordered the number is [phone] to schedule or change your radiology appointment.   Medical Concerns:  If you have urgent medical concerns please call [phone] at any time of the day.    Halina Mariee, DO            Follow-ups after your visit        Who to contact     Please call your clinic at [phone] to:    Ask questions about your health    Make or cancel appointments    Discuss your medicines    Learn about your test results    Speak to your doctor            Additional Information About Your Visit        [facility] Information     [employer] is an electronic gateway that provides easy, online access to your medical records. With [employer], you can request a clinic appointment, read your test results, renew a prescription or communicate with your care team.     To sign up for [employer] visit the website at [email or web address]   You will be asked to enter the access code listed below, as well as some personal information. Please follow the directions to create your username and password.     Your access code is: [email or web address]  Expires: 2018  2:53 PM     Your access code will  in 90 days. If you need help or a new code, please contact your [facility] or call [phone] for assistance.        Care EveryWhere ID     This is your Care EveryWhere ID. This could be used by other organizations to access your [place] medical records  [id number]        Your Vitals Were     Pulse Respirations Pulse Oximetry BMI (Body Mass Index)          80 16 99% 28.15 kg/m2         Blood Pressure from Last 3 Encounters:   18 137/83   18 117/71   10/31/17 138/83    Weight from Last 3 Encounters:   18 149 lb (67.6 kg)   18 148 lb (67.1 kg)   10/31/17 147 lb (66.7 kg)              We Performed the Following     Hemoglobin A1c ([facility])          Today's Medication Changes          These changes are accurate as of 3/30/18  2:42 PM.  If you  have any questions, ask your nurse or doctor.               Start taking these medicines.        Dose/Directions    meclizine 25 MG tablet   Commonly known as:  ANTIVERT   Used for:  Vertigo   Started by:  Halina Mariee DO        Dose:  25 mg   Take 1 tablet (25 mg) by mouth 3 times daily as needed for dizziness   Quantity:  30 tablet   Refills:  1            Where to get your medicines      These medications were sent to [facility] [place], [place] - [address], [facility] [address]     Phone:  [phone]     meclizine 25 MG tablet                Primary Care Provider Office Phone # Fax #    Herminia MD Carlito [phone]       [facility] [address]   [facility] [address]        Equal Access to Services     MAHI LYNCH : Giselle mclain Sodenise, waaxda luqadaha, qaybta kaalmada adeegyada, mary gonzalez . So [facility] [phone].    ATENCIÓN: Si habla español, tiene a castano disposición servicios gratuitos de asistencia lingüística. [facility] [phone].    We comply with applicable federal civil rights laws and [place] laws. We do not discriminate on the basis of race, color, national origin, age, disability, sex, sexual orientation, or gender identity.            Thank you!     Thank you for choosing [facility]  for your care. Our goal is always to provide you with excellent care. Hearing back from our patients is one way we can continue to improve our services. Please take a few minutes to complete the written survey that you may receive in the mail after your visit with us. Thank you!             Your Updated Medication List - Protect others around you: Learn how to safely use, store and throw away your medicines at www.disposemymeds.org.          This list is accurate as of 3/30/18  2:42 PM.  Always use your most recent med list.                   Brand Name Dispense Instructions  for use Diagnosis    acetaminophen 500 MG tablet    TYLENOL    100 tablet    Take 2 tablets (1,000 mg) by mouth every 8 hours as needed for mild pain    Chronic lumbar radiculopathy       carbamide peroxide 6.5 % otic solution    DEBROX    30 mL    Place 5-10 drops into both ears 2 times daily    Bilateral impacted cerumen       carboxymethylcellulose 0.5 % Soln ophthalmic solution    REFRESH PLUS    1 Bottle    Place 1 drop into both eyes 3 times daily as needed for dry eyes    Dry eyes       cetirizine 10 MG tablet    zyrTEC    30 tablet    Take 1 tablet (10 mg) by mouth every evening    Chronic seasonal allergic rhinitis due to other allergen       cholecalciferol 1000 UNIT tablet    vitamin D3    100 tablet    Take 1 tablet (1,000 Units) by mouth daily    Vitamin D deficiency       diclofenac 1 % Gel topical gel    VOLTAREN    100 g    Apply 4 grams to lower back four times daily using enclosed dosing card.    Chronic lumbar radiculopathy       DULoxetine 60 MG EC capsule    CYMBALTA    120 capsule    Take 1 capsule (60 mg) by mouth 2 times daily    Major depressive disorder, recurrent episode, moderate (H), Chronic lumbar radiculopathy       gabapentin 100 MG capsule    NEURONTIN    180 capsule    TAKE TWO CAPSULES BY MOUTH AT BEDTIME    Chronic pain syndrome       ketotifen 0.025 % Soln ophthalmic solution    KP KETOTIFEN FUMARATE    1 Bottle    Place 1 drop into both eyes 2 times daily    Seasonal allergic conjunctivitis       loperamide 2 MG tablet    IMODIUM A-D    30 tablet    Take 2 tabs (4 mg) after first loose stool, and then take one tab (2 mg) after each diarrheal stool.  Max of 8 tabs (16 mg) per day.    Viral gastroenteritis       meclizine 25 MG tablet    ANTIVERT    30 tablet    Take 1 tablet (25 mg) by mouth 3 times daily as needed for dizziness    Vertigo       pantoprazole 40 MG EC tablet    PROTONIX    30 tablet    TAKE ONE TABLET BY MOUTH EVERY DAY 30-60 MINUTES BEFORE A MEAL     Gastroesophageal reflux disease without esophagitis       sodium chloride 0.65 % nasal spray    OCEAN    1 Bottle    Spray 1 spray into both nostrils daily as needed for congestion    Chronic seasonal allergic rhinitis due to other allergen       UNKNOWN TO PATIENT       Stephen

## 2018-03-30 NOTE — PROGRESS NOTES
HPI:       Lou Winston is a 78 year old who presents for the following  Patient presents with:  Recheck Medication: f/u depression meds    Patient returns today for follow-up.   - Zyrtec didn't help her congestion symptoms. Not interested in trying other medications. States symptoms will improve when the weather gets warmer anyways.     Dizziness:  Describes intermittent episodes of dizziness. Described as the room moving around her. Occurs every other day. Lasts for 2 hours. No nausea, vomiting. No hearing loss. No clear precipitating or palliating factors - will just sit down until symptoms resolve.    Mood: Continues to have depressed mood. Describes lots of episodes of fear - fearful of other people. Doesn't feel safe if she is home alone. States she has seen a psychologist for this in the past, not interested in doing this or therapy again.     Problem, Medication and Allergy Lists were reviewed and are current.  Patient is an established patient of this clinic.         Review of Systems:   Review of Systems   Constitutional: Negative.    HENT: Positive for congestion, sinus pain and sinus pressure. Negative for ear discharge, ear pain, hearing loss, rhinorrhea and tinnitus.    Neurological: Positive for dizziness. Negative for facial asymmetry, weakness and headaches.   Psychiatric/Behavioral: The patient is nervous/anxious.              Physical Exam:   Patient Vitals for the past 24 hrs:   BP Pulse Resp SpO2 Weight   03/30/18 1417 137/83 80 16 99 % 149 lb (67.6 kg)     Body mass index is 28.15 kg/(m^2).  Vitals were reviewed and were normal     Physical Exam   Constitutional: She is oriented to person, place, and time. She appears well-developed.   HENT:   Head: Normocephalic and atraumatic.   Bilateral maxillary sinus tenderness.    Eyes: Conjunctivae and lids are normal. Pupils are equal, round, and reactive to light. Right eye exhibits nystagmus. Left eye exhibits nystagmus.   Horizontal  nystagmus, fatigues with 2-3 beats. Patient indicates this reproduces her symptoms.    Neck: Normal range of motion. Neck supple.   Pulmonary/Chest: Effort normal.   Neurological: She is alert and oriented to person, place, and time. She has normal strength and normal reflexes. No cranial nerve deficit.   Skin: Skin is warm and dry.   Psychiatric: She has a normal mood and affect. Her behavior is normal. Judgment and thought content normal.         Results:      Results from the last 24 hours  Results for orders placed or performed in visit on 03/30/18 (from the past 24 hour(s))   Hemoglobin A1c ([facility])   Result Value Ref Range    Hemoglobin A1C 6.2 (H) 4.1 - 5.7 %     Assessment and Plan     Lou was seen today for recheck medication.    Diagnoses and all orders for this visit:    Vertigo  Dizziness likely vertigo, trial meclizine.  -     meclizine (ANTIVERT) 25 MG tablet; Take 1 tablet (25 mg) by mouth 3 times daily as needed for dizziness    Chronic seasonal allergic rhinitis due to other allergen  Unchanged with Zyrtec. Would consider imaging (CT) if she has persistent sinus symptoms next visit despite change in weather. Should return in 1 month to reassess.     Prediabetes  Form completed that patient should continue on diabetic diet given her hx of prediabetes. Repeat A1c today (last done 2 years ago and was abnormal)  -     Hemoglobin A1c ([facility])    Special screening for malignant neoplasms, colon  -     Fecal colorectal cancer screen FITT; Future    RTC in 1 month for f/u vertigo and sinusitis    There are no discontinued medications.  Options for treatment and follow-up care were reviewed with the patient. Lou Winston  engaged in the decision making process and verbalized understanding of the options discussed and agreed with the final plan.    Halina Mariee DO

## 2018-03-30 NOTE — LETTER
April 2, 2018      Lou Winston  [address]  [place] [address]        Dear Lou,    Please see below for your test results. Your A1c (for diabetes) is still in the pre-diabetes level. We will keep checking this once a year to monitor.     Your results are reassuring.If you have questions please contact the clinic to make an appointment with  me or your primary doctor to discuss the results.     Citizen of the [place] translation:alvin hoyos gu renanqiijiclaudio jawaabtaadii dallin lunal qabto xuan martha wac dhaqtasoniaka merylu qaaska ah oo ka qabso cristóbal si uu rex vasquezxca jawaabtaada.      Resulted Orders   Hemoglobin A1c ([facility])   Result Value Ref Range    Hemoglobin A1C 6.2 (H) 4.1 - 5.7 %       If you have any questions, please call the clinic to make an appointment.    Sincerely,    Halina Mariee, DO

## 2018-03-30 NOTE — PATIENT INSTRUCTIONS
Here is the plan from today's visit    1. Vertigo  - meclizine (ANTIVERT) 25 MG tablet; Take 1 tablet (25 mg) by mouth 3 times daily as needed for dizziness  Dispense: 30 tablet; Refill: 1    2. Prediabetes  - Hemoglobin A1c ([facility])    Please call or return to clinic if your symptoms don't go away.    Follow up plan  Please make a clinic appointment for follow up with your primary physician Ernesto Esteban MD in 1 month for follow-up.    Thank you for coming to [facility] today.  Lab Testing:  **If you had lab testing today and your results are reassuring or normal they will be mailed to you or sent through [employer] within 7 days.   **If the lab tests need quick action we will call you with the results.  The phone number we will call with results is # [phone] (home) . If this is not the best number please call our clinic and change the number.  Medication Refills:  If you need any refills please call your pharmacy and they will contact us.   If you need to  your refill at a new pharmacy, please contact the new pharmacy directly. The new pharmacy will help you get your medications transferred faster.   Scheduling:  If you have any concerns about today's visit or wish to schedule another appointment please call our office during normal business hours [phone] (8-5:00 M-F)  If a referral was made to a [facility] and you don't get a call from central scheduling please call [phone].  If a Mammogram was ordered for you at [facility] call [phone] to schedule or change your appointment.  If you had an XRay/CT/Ultrasound/MRI ordered the number is [phone] to schedule or change your radiology appointment.   Medical Concerns:  If you have urgent medical concerns please call [phone] at any time of the day.    Halina Mariee DO

## 2018-04-10 ENCOUNTER — OFFICE VISIT (OUTPATIENT)
Dept: FAMILY MEDICINE | Facility: CLINIC | Age: 78
End: 2018-04-10
Payer: COMMERCIAL

## 2018-04-10 VITALS
WEIGHT: 148 LBS | OXYGEN SATURATION: 97 % | SYSTOLIC BLOOD PRESSURE: 125 MMHG | HEIGHT: 61 IN | TEMPERATURE: 97.8 F | BODY MASS INDEX: 27.94 KG/M2 | HEART RATE: 87 BPM | DIASTOLIC BLOOD PRESSURE: 81 MMHG | RESPIRATION RATE: 16 BRPM

## 2018-04-10 DIAGNOSIS — M62.830 BACK MUSCLE SPASM: Primary | ICD-10-CM

## 2018-04-10 RX ORDER — IBUPROFEN 800 MG/1
800 TABLET, FILM COATED ORAL EVERY 8 HOURS PRN
Qty: 30 TABLET | Refills: 1 | Status: SHIPPED | OUTPATIENT
Start: 2018-04-10 | End: 2019-01-10 | Stop reason: SINTOL

## 2018-04-10 RX ORDER — CYCLOBENZAPRINE HCL 5 MG
5-10 TABLET ORAL 3 TIMES DAILY PRN
Qty: 30 TABLET | Refills: 0 | Status: SHIPPED | OUTPATIENT
Start: 2018-04-10 | End: 2018-05-25

## 2018-04-10 ASSESSMENT — ENCOUNTER SYMPTOMS
RHINORRHEA: 0
CONSTIPATION: 0
SHORTNESS OF BREATH: 0
FEVER: 1
HEMATURIA: 0
COUGH: 0
ABDOMINAL PAIN: 0
VOMITING: 0
NAUSEA: 0
LIGHT-HEADEDNESS: 0
DIARRHEA: 0
DIZZINESS: 0
DIFFICULTY URINATING: 0
HEADACHES: 1
CHILLS: 0
SORE THROAT: 0
DIAPHORESIS: 0
DYSURIA: 0

## 2018-04-10 NOTE — PATIENT INSTRUCTIONS
Here is the plan from today's visit    1. Back muscle spasm  - cyclobenzaprine (FLEXERIL) 5 MG tablet; Take 1-2 tablets (5-10 mg) by mouth 3 times daily as needed for muscle spasms  Dispense: 30 tablet; Refill: 0  - ibuprofen (ADVIL/MOTRIN) 800 MG tablet; Take 1 tablet (800 mg) by mouth every 8 hours as needed for moderate pain  Dispense: 30 tablet; Refill: 1    Please call or return to clinic if your symptoms don't go away.    Follow up plan  Please make a clinic appointment for follow up with me (SHANIKA WELLER) in 4  weeks.    Thank you for coming to [facility] today.  Lab Testing:  **If you had lab testing today and your results are reassuring or normal they will be mailed to you or sent through [employer] within 7 days.   **If the lab tests need quick action we will call you with the results.  The phone number we will call with results is # [phone] (home) . If this is not the best number please call our clinic and change the number.  Medication Refills:  If you need any refills please call your pharmacy and they will contact us.   If you need to  your refill at a new pharmacy, please contact the new pharmacy directly. The new pharmacy will help you get your medications transferred faster.   Scheduling:  If you have any concerns about today's visit or wish to schedule another appointment please call our office during normal business hours [phone] (8-5:00 M-F)  If a referral was made to a [facility] and you don't get a call from central scheduling please call [phone].  If a Mammogram was ordered for you at [facility] call [phone] to schedule or change your appointment.  If you had an XRay/CT/Ultrasound/MRI ordered the number is [phone] to schedule or change your radiology appointment.   Medical Concerns:  If you have urgent medical concerns please call [phone] at any time of the day.  If you have a medical emergency please call [phone].

## 2018-04-10 NOTE — MR AVS SNAPSHOT
After Visit Summary   4/10/2018    Lou Winston    MRN: [id number]           Patient Information     Date Of Birth          1940        Visit Information        Provider Department      4/10/2018 11:20 AM Alexa Weller MD [facility]        Today's Diagnoses     Back muscle spasm    -  1      Care Instructions    Here is the plan from today's visit    1. Back muscle spasm  - cyclobenzaprine (FLEXERIL) 5 MG tablet; Take 1-2 tablets (5-10 mg) by mouth 3 times daily as needed for muscle spasms  Dispense: 30 tablet; Refill: 0  - ibuprofen (ADVIL/MOTRIN) 800 MG tablet; Take 1 tablet (800 mg) by mouth every 8 hours as needed for moderate pain  Dispense: 30 tablet; Refill: 1    Please call or return to clinic if your symptoms don't go away.    Follow up plan  Please make a clinic appointment for follow up with me (ALEXA WELLER) in 4  weeks.    Thank you for coming to [facility] today.  Lab Testing:  **If you had lab testing today and your results are reassuring or normal they will be mailed to you or sent through [employer] within 7 days.   **If the lab tests need quick action we will call you with the results.  The phone number we will call with results is # [phone] (home) . If this is not the best number please call our clinic and change the number.  Medication Refills:  If you need any refills please call your pharmacy and they will contact us.   If you need to  your refill at a new pharmacy, please contact the new pharmacy directly. The new pharmacy will help you get your medications transferred faster.   Scheduling:  If you have any concerns about today's visit or wish to schedule another appointment please call our office during normal business hours [phone] (8-5:00 M-F)  If a referral was made to a [facility] and you don't get a call from central scheduling please call [phone].  If a M[facility] was ordered for you at [facility] call [phone] to schedule or change your appointment.  If you had an XRay/CT/Ultrasound/MRI ordered the number is [phone] to schedule or change your radiology appointment.   Medical Concerns:  If you have urgent medical concerns please call [phone] at any time of the day.  If you have a medical emergency please call 911.            Follow-ups after your visit        Who to contact     Please call your clinic at [phone] to:    Ask questions about your health    Make or cancel appointments    Discuss your medicines    Learn about your test results    Speak to your doctor            Additional Information About Your Visit        POWWOWharSendmybag Information     [employer] is an electronic gateway that provides easy, online access to your medical records. With [employer], you can request a clinic appointment, read your test results, renew a prescription or communicate with your care team.     To sign up for [employer] visit the website at [email or web address]   You will be asked to enter the access code listed below, as well as some personal information. Please follow the directions to create your username and password.     Your access code is: [email or web address]  Expires: 2018  2:53 PM     Your access code will  in 90 days. If you need help or a new code, please contact your [facility] or call [phone] for assistance.        Care EveryWhere ID     This is your Care EveryWhere ID. This could be used by other organizations to access your [place] medical records  [id number]        Your Vitals Were     Pulse Temperature Respirations Height Pulse Oximetry Breastfeeding?    87 97.8  F (36.6  C) (Oral) 16 5' 1\" (154.9 cm) 97% No    BMI (Body Mass Index)                   27.96 kg/m2            Blood Pressure from Last 3 Encounters:   04/10/18 125/81   18 137/83   18 117/71    Weight from Last 3 Encounters:   04/10/18 148 lb (67.1 kg)   18 149 " lb (67.6 kg)   02/19/18 148 lb (67.1 kg)              Today, you had the following     No orders found for display         Today's Medication Changes          These changes are accurate as of 4/10/18 11:57 AM.  If you have any questions, ask your nurse or doctor.               Start taking these medicines.        Dose/Directions    cyclobenzaprine 5 MG tablet   Commonly known as:  FLEXERIL   Used for:  Back muscle spasm   Started by:  Alexa Biggs MD        Dose:  5-10 mg   Take 1-2 tablets (5-10 mg) by mouth 3 times daily as needed for muscle spasms   Quantity:  30 tablet   Refills:  0       ibuprofen 800 MG tablet   Commonly known as:  ADVIL/MOTRIN   Used for:  Back muscle spasm   Started by:  Alexa Biggs MD        Dose:  800 mg   Take 1 tablet (800 mg) by mouth every 8 hours as needed for moderate pain   Quantity:  30 tablet   Refills:  1            Where to get your medicines      These medications were sent to [facility] [place], [place] - [address]   [address] [facility] [address]     Phone:  [phone]     cyclobenzaprine 5 MG tablet    ibuprofen 800 MG tablet                Primary Care Provider Office Phone # Fax #    Herminia MD Carlito [phone]       [facility] [address] [facility] [address]  [facility] [address]        Equal Access to Services     MAHI LYNCH AH: Giselle mclain Sodenise, waaxda luqadaha, qaybta kaalmada adeegyada, mary gonzalez . So [facility] [phone].    ATENCIÓN: Si habla español, tiene a castano disposición servicios gratuitos de asistencia lingüística. Llame al [phone].    We comply with applicable federal civil rights laws and [place] laws. We do not discriminate on the basis of race, color, national origin, age, disability, sex, sexual orientation, or gender identity.            Thank you!     Thank you for choosing [facility]  for your care. Our goal is always to  provide you with excellent care. Hearing back from our patients is one way we can continue to improve our services. Please take a few minutes to complete the written survey that you may receive in the mail after your visit with us. Thank you!             Your Updated Medication List - Protect others around you: Learn how to safely use, store and throw away your medicines at www.disposemymeds.org.          This list is accurate as of 4/10/18 11:57 AM.  Always use your most recent med list.                   Brand Name Dispense Instructions for use Diagnosis    acetaminophen 500 MG tablet    TYLENOL    100 tablet    Take 2 tablets (1,000 mg) by mouth every 8 hours as needed for mild pain    Chronic lumbar radiculopathy       carbamide peroxide 6.5 % otic solution    DEBROX    30 mL    Place 5-10 drops into both ears 2 times daily    Bilateral impacted cerumen       carboxymethylcellulose 0.5 % Soln ophthalmic solution    REFRESH PLUS    1 Bottle    Place 1 drop into both eyes 3 times daily as needed for dry eyes    Dry eyes       cholecalciferol 1000 UNIT tablet    vitamin D3    100 tablet    Take 1 tablet (1,000 Units) by mouth daily    Vitamin D deficiency       cyclobenzaprine 5 MG tablet    FLEXERIL    30 tablet    Take 1-2 tablets (5-10 mg) by mouth 3 times daily as needed for muscle spasms    Back muscle spasm       diclofenac 1 % Gel topical gel    VOLTAREN    100 g    Apply 4 grams to lower back four times daily using enclosed dosing card.    Chronic lumbar radiculopathy       DULoxetine 60 MG EC capsule    CYMBALTA    120 capsule    Take 1 capsule (60 mg) by mouth 2 times daily    Major depressive disorder, recurrent episode, moderate (H), Chronic lumbar radiculopathy       gabapentin 100 MG capsule    NEURONTIN    180 capsule    TAKE TWO CAPSULES BY MOUTH AT BEDTIME    Chronic pain syndrome       ibuprofen 800 MG tablet    ADVIL/MOTRIN    30 tablet    Take 1 tablet (800 mg) by mouth every 8 hours as needed  for moderate pain    Back muscle spasm       ketotifen 0.025 % Soln ophthalmic solution    KP KETOTIFEN FUMARATE    1 Bottle    Place 1 drop into both eyes 2 times daily    Seasonal allergic conjunctivitis       loperamide 2 MG tablet    IMODIUM A-D    30 tablet    Take 2 tabs (4 mg) after first loose stool, and then take one tab (2 mg) after each diarrheal stool.  Max of 8 tabs (16 mg) per day.    Viral gastroenteritis       meclizine 25 MG tablet    ANTIVERT    30 tablet    Take 1 tablet (25 mg) by mouth 3 times daily as needed for dizziness    Vertigo       pantoprazole 40 MG EC tablet    PROTONIX    30 tablet    TAKE ONE TABLET BY MOUTH EVERY DAY 30-60 MINUTES BEFORE A MEAL    Gastroesophageal reflux disease without esophagitis       sodium chloride 0.65 % nasal spray    OCEAN    1 Bottle    Spray 1 spray into both nostrils daily as needed for congestion    Chronic seasonal allergic rhinitis due to other allergen       UNKNOWN TO PATIENT       [place]

## 2018-04-10 NOTE — PROGRESS NOTES
"      HPI:       Lou Winston is a 78 year old who presents for the following  Patient presents with:  Pain: Sharp back pain X3days    Patient reports sore muscles starting in the front at the sternal bone to the back. It is radiating down her right arm. She has been taking tylenol and that has not been helping. Heat helps.  Stretching makes the pain better. She denies any injury.     An [employer]  was used for  this visit.      Problem, Medication and Allergy Lists were reviewed and are current.  Patient is an established patient of this clinic.         Review of Systems:   Review of Systems   Constitutional: Positive for fever (subjective). Negative for chills and diaphoresis.   HENT: Negative for congestion, rhinorrhea and sore throat.    Eyes: Negative for visual disturbance.   Respiratory: Negative for cough and shortness of breath.    Cardiovascular: Negative for chest pain.   Gastrointestinal: Negative for abdominal pain, constipation, diarrhea, nausea and vomiting.   Genitourinary: Negative for difficulty urinating, dysuria and hematuria.   Neurological: Positive for headaches (sometimes). Negative for dizziness and light-headedness.             Physical Exam:   Patient Vitals for the past 24 hrs:   BP Temp Temp src Pulse Resp SpO2 Height Weight   04/10/18 1134 125/81 97.8  F (36.6  C) Oral 87 16 97 % 5' 1\" (154.9 cm) 148 lb (67.1 kg)     Body mass index is 27.96 kg/(m^2).  Vitals were reviewed and were normal     Physical Exam   Constitutional: She appears well-developed and well-nourished.   HENT:   Head: Normocephalic and atraumatic.   Eyes: Conjunctivae and EOM are normal. Pupils are equal, round, and reactive to light. No scleral icterus.   Neck: Normal range of motion. Neck supple.   Cardiovascular: Normal rate, regular rhythm and normal heart sounds.    Pulmonary/Chest: Effort normal and breath sounds normal.   Abdominal: Soft. Bowel sounds are normal.   Musculoskeletal:   Reproducible pain " on the right side of the chest anteriorly posteriorly and laterally.  Mostly tender around the right scapula.   Neurological: She is alert. No cranial nerve deficit.         Results:     No investigations ordered.  Assessment and Plan     1. Back muscle spasm  Patient presenting with acute onset right-sided body pain.  Her pain is reproducible and exacerbated by movements.  Her vital signs including respiratory rate, heart rate and pulse ox is normal.  Reassured patient.  Discussed this is most likely acute muscle spasm.  Encouraged her to continue to use heat as needed for pain relief.  Muscle relaxant and NSAIDs ordered for pain relief.  Recommended taking NSAIDs with food.  Advised against using heavy machinery or driving while on the muscle relaxant.  Follow-up in 4 weeks to evaluate for symptom progression vs resolution.   - cyclobenzaprine (FLEXERIL) 5 MG tablet; Take 1-2 tablets (5-10 mg) by mouth 3 times daily as needed for muscle spasms  Dispense: 30 tablet; Refill: 0  - ibuprofen (ADVIL/MOTRIN) 800 MG tablet; Take 1 tablet (800 mg) by mouth every 8 hours as needed for moderate pain  Dispense: 30 tablet; Refill: 1  There are no discontinued medications.  Options for treatment and follow-up care were reviewed with the patient. Lou Winston  engaged in the decision making process and verbalized understanding of the options discussed and agreed with the final plan.    Alexa Biggs MD MPH  PGY3- [facility], Family Medicine  Pager- [phone]

## 2018-04-11 ASSESSMENT — PATIENT HEALTH QUESTIONNAIRE - PHQ9: SUM OF ALL RESPONSES TO PHQ QUESTIONS 1-9: 0

## 2018-04-11 NOTE — PROGRESS NOTES
Preceptor Attestation:   Patient seen, evaluated and discussed with the resident. I have verified the content of the note, which accurately reflects my assessment of the patient and the plan of care.   Supervising Physician:  Jean Napoles MD

## 2018-05-03 ENCOUNTER — DOCUMENTATION ONLY (OUTPATIENT)
Dept: FAMILY MEDICINE | Facility: CLINIC | Age: 78
End: 2018-05-03

## 2018-05-21 NOTE — PROGRESS NOTES
Visit to the client's home for annual health risk assessment and PCA assessment.  An  was present.    Current situation/living environment  Client is living in an apartment with her granddaughter. The home is neat and clean. Client reports granddaughter is not home a lot but does provide daily assistance.    Activities of daily living (ADL)/instrumental activities of daily living (IADL) and functional issues  Client needs help with the following ADL's: dressing, grooming and bathing  Client needs help with the following IADL's: shopping, cooking, housekeeping, laundry, managing finances/bills and transportation  Client states she is unable to perform the above due to chronic back pain and R shoulder pain.      Health concerns for today  Client reports continued back pain. States R shoulder pain is newer. She has done therapy in the past and states she is still doing the exercises on her own. Client states pain meds help some but not a lot.  Has patient fallen 2 or more times in the last year? Yes  Has patient fallen with injury in the last year? No    Cognition/mental health  Client is alert and oriented. She states she has been having increased issues with her memory/cognition and has even left her day care in the winter without shoes. Client's daughter and granddaughter provide daily check ins several times a day to ensure her safety. Client reports she is having more depression symptoms. She plans to go back to her therapist at [facility] in the future if her mood does not improve.    STARS/Med Adherence  Client is non-compliant with the following quality measures: Client up to date based on age  Comments: n/a    Client's Plan of Care consists of:  Adult day care (3 days per week), Homemaker services (5 hours per week), Personal care assistance (PCA) (1.75 hours per day) and Transportation    Ashlee Lopes RN  [facility] Care Coordinator  [phone]

## 2018-05-25 ENCOUNTER — OFFICE VISIT (OUTPATIENT)
Dept: FAMILY MEDICINE | Facility: CLINIC | Age: 78
End: 2018-05-25
Payer: COMMERCIAL

## 2018-05-25 VITALS
SYSTOLIC BLOOD PRESSURE: 111 MMHG | HEART RATE: 75 BPM | WEIGHT: 142.8 LBS | OXYGEN SATURATION: 95 % | DIASTOLIC BLOOD PRESSURE: 75 MMHG | RESPIRATION RATE: 16 BRPM | TEMPERATURE: 98.6 F | BODY MASS INDEX: 26.98 KG/M2

## 2018-05-25 DIAGNOSIS — M62.830 BACK MUSCLE SPASM: ICD-10-CM

## 2018-05-25 DIAGNOSIS — K21.9 GASTROESOPHAGEAL REFLUX DISEASE WITHOUT ESOPHAGITIS: ICD-10-CM

## 2018-05-25 DIAGNOSIS — F33.1 MAJOR DEPRESSIVE DISORDER, RECURRENT EPISODE, MODERATE (H): ICD-10-CM

## 2018-05-25 DIAGNOSIS — M54.16 CHRONIC LUMBAR RADICULOPATHY: ICD-10-CM

## 2018-05-25 RX ORDER — DULOXETIN HYDROCHLORIDE 60 MG/1
60 CAPSULE, DELAYED RELEASE ORAL 2 TIMES DAILY
Qty: 60 CAPSULE | Refills: 11 | Status: SHIPPED | OUTPATIENT
Start: 2018-05-25 | End: 2019-04-05

## 2018-05-25 RX ORDER — PANTOPRAZOLE SODIUM 40 MG/1
TABLET, DELAYED RELEASE ORAL
Qty: 30 TABLET | Refills: 11 | Status: SHIPPED | OUTPATIENT
Start: 2018-05-25 | End: 2018-12-13

## 2018-05-25 RX ORDER — CYCLOBENZAPRINE HCL 5 MG
5-10 TABLET ORAL
Qty: 20 TABLET | Refills: 0 | Status: SHIPPED | OUTPATIENT
Start: 2018-05-25 | End: 2019-01-24

## 2018-05-25 ASSESSMENT — ENCOUNTER SYMPTOMS
CARDIOVASCULAR NEGATIVE: 1
BACK PAIN: 1
RESPIRATORY NEGATIVE: 1
CONSTITUTIONAL NEGATIVE: 1

## 2018-05-25 NOTE — NURSING NOTE
Due to patient being non-English speaking/uses sign language, an  was used for this visit. Only for face-to-face interpretation by an external agency, date and length of interpretation can be found on the scanned worksheet.     name: Ira Wray  Agency: [employer]  Language: [place]   Telephone number: [phone]  Type of interpretation: Face-to-face, spoken        Mirela Nieto CMA

## 2018-05-25 NOTE — PATIENT INSTRUCTIONS
Here is the plan from today's visit    Chronic lumbar radiculopathy  - DULoxetine (CYMBALTA) 60 MG EC capsule; Take 1 capsule (60 mg) by mouth 2 times daily  Dispense: 60 capsule; Refill: 11    Continue Tylenol, 1000 mg 3 times daily.     Back muscle spasm  - cyclobenzaprine (FLEXERIL) 5 MG tablet; Take 1-2 tablets (5-10 mg) by mouth nightly as needed for muscle spasms  Dispense: 20 tablet; Refill: 0    Gastroesophageal reflux disease without esophagitis    - pantoprazole (PROTONIX) 40 MG EC tablet; TAKE ONE TABLET BY MOUTH EVERY DAY 30-60 MINUTES BEFORE A MEAL  Dispense: 30 tablet; Refill: 11      Follow-up with no improvement or worsening of symptoms.      Thank you for coming to [facility] today.  Lab Testing:  **If you had lab testing today and your results are reassuring or normal they will be mailed to you or sent through [employer] within 7 days.   **If the lab tests need quick action we will call you with the results.  The phone number we will call with results is # [phone] (home) . If this is not the best number please call our clinic and change the number.  Medication Refills:  If you need any refills please call your pharmacy and they will contact us.   If you need to  your refill at a new pharmacy, please contact the new pharmacy directly. The new pharmacy will help you get your medications transferred faster.   Scheduling:  If you have any concerns about today's visit or wish to schedule another appointment please call our office during normal business hours [phone] (8-5:00 M-F)  If a referral was made to a [facility] and you don't get a call from central scheduling please call [phone].  If a Mammogram was ordered for you at [facility] call [phone] to schedule or change your appointment.  If you had an XRay/CT/Ultrasound/MRI ordered the number is [phone] to schedule or change your radiology appointment.   Medical Concerns:  If you have  urgent medical concerns please call [phone] at any time of the day.  If you have a medical emergency please call [phone].

## 2018-05-25 NOTE — PROGRESS NOTES
"      HPI:       Lou Winston is a 78 year old who presents for the following  Patient presents with:  Musculoskeletal Problem: chronic pain x's 1 week of the right leg. Taking tylenol which seems to help relieve some pain.  Refill Request: protonix and tylenol 500mg       Follow up for: chronic low back pain    - patient presents today with lower right side back pain that radiates down the entire extremity.   -throbbing pain with numbness.   -taking tylenol 500mg x's 3 times a day for severe pain or 1 x's a day for moderate pain.    Patient reports worsening of chronic back pain over the past one week.    Previously Tylenol was helpful for her pain.  Is currently taking Tylenol, 1000 mg 3 times daily.      Last participated in physical therapy \"not too long\" ago.    Previously went to [facility] - this was slightly helpful.     Normally walks on a daily basis, every morning.      Unable to tolerate Gabapentin at night due to heartburn.  Stopped this a month ago.      Last took Cymbalta 10 days ago, needs refill of medication.       An [employer]  was used for  this visit.        Problem, Medication and Allergy Lists were reviewed and are current.  Patient is an established patient of this clinic.         Review of Systems:   Review of Systems   Constitutional: Negative.    Respiratory: Negative.    Cardiovascular: Negative.    Musculoskeletal: Positive for back pain (worsening chronic back pain).             Physical Exam:     Patient Vitals for the past 24 hrs:   BP Temp Temp src Pulse Resp SpO2 Weight   05/25/18 1501 111/75 98.6  F (37  C) Oral 75 16 95 % 142 lb 12.8 oz (64.8 kg)     Body mass index is 26.98 kg/(m^2).  Vitals were reviewed and were normal     Physical Exam   Constitutional: She appears well-nourished. No distress.   Musculoskeletal:        Lumbar back: She exhibits tenderness (right lumbar paraspinal muscles with tenderness to palpation). She exhibits normal range of motion. "   Neurological:   Reflex Scores:       Patellar reflexes are 2+ on the right side and 2+ on the left side.  Strength in LE's is +5/5, symmetrical        Assessment and Plan     Lou was seen today for musculoskeletal problem and refill request.    Diagnoses and all orders for this visit:    Chronic lumbar radiculopathy  -     DULoxetine (CYMBALTA) 60 MG EC capsule; Take 1 capsule (60 mg) by mouth 2 times daily  Offered physical therapy referral, patient declined.   Declined restarting Gabapentin as this caused worsening of esophageal reflux.    Encouraged continued exercise/walking.    Continue to schedule Acetaminophen, 1000 mg three times daily.     Back muscle spasm  -     cyclobenzaprine (FLEXERIL) 5 MG tablet; Take 1-2 tablets (5-10 mg) by mouth nightly as needed for muscle spasms    Gastroesophageal reflux disease without esophagitis  -     pantoprazole (PROTONIX) 40 MG EC tablet; TAKE ONE TABLET BY MOUTH EVERY DAY 30-60 MINUTES BEFORE A MEAL    Follow-up in 2-4 weeks with PCP, sooner as needed.    Options for treatment and follow-up care were reviewed with the patient. Lou Winston  engaged in the decision making process and verbalized understanding of the options discussed and agreed with the final plan.    Carla Lanier, LEE CNP

## 2018-05-25 NOTE — MR AVS SNAPSHOT
After Visit Summary   5/25/2018    Lou Winston    MRN: [id number]           Patient Information     Date Of Birth          1940        Visit Information        Provider Department      5/25/2018 2:40 PM Carla Lanier APRN CNP [facility]        Today's Diagnoses     Major depressive disorder, recurrent episode, moderate (H)        Chronic lumbar radiculopathy        Back muscle spasm        Gastroesophageal reflux disease without esophagitis          Care Instructions    Here is the plan from today's visit    Chronic lumbar radiculopathy  - DULoxetine (CYMBALTA) 60 MG EC capsule; Take 1 capsule (60 mg) by mouth 2 times daily  Dispense: 60 capsule; Refill: 11    Continue Tylenol, 1000 mg 3 times daily.     Back muscle spasm  - cyclobenzaprine (FLEXERIL) 5 MG tablet; Take 1-2 tablets (5-10 mg) by mouth nightly as needed for muscle spasms  Dispense: 20 tablet; Refill: 0    Gastroesophageal reflux disease without esophagitis    - pantoprazole (PROTONIX) 40 MG EC tablet; TAKE ONE TABLET BY MOUTH EVERY DAY 30-60 MINUTES BEFORE A MEAL  Dispense: 30 tablet; Refill: 11      Follow-up with no improvement or worsening of symptoms.      Thank you for coming to [facility] today.  Lab Testing:  **If you had lab testing today and your results are reassuring or normal they will be mailed to you or sent through [employer] within 7 days.   **If the lab tests need quick action we will call you with the results.  The phone number we will call with results is # [phone] (home) . If this is not the best number please call our clinic and change the number.  Medication Refills:  If you need any refills please call your pharmacy and they will contact us.   If you need to  your refill at a new pharmacy, please contact the new pharmacy directly. The new pharmacy will help you get your medications transferred faster.   Scheduling:  If you have any concerns about today's visit or  wish to schedule another appointment please call our office during normal business hours [phone] (8-5:00 M-F)  If a referral was made to a [facility] and you don't get a call from central scheduling please call [phone].  If a Mammogram was ordered for you at [facility] call [phone] to schedule or change your appointment.  If you had an XRay/CT/Ultrasound/MRI ordered the number is [phone] to schedule or change your radiology appointment.   Medical Concerns:  If you have urgent medical concerns please call [phone] at any time of the day.  If you have a medical emergency please call [phone].            Follow-ups after your visit        Who to contact     Please call your clinic at [phone] to:    Ask questions about your health    Make or cancel appointments    Discuss your medicines    Learn about your test results    Speak to your doctor            Additional Information About Your Visit        Care EveryWhere ID     This is your Care EveryWhere ID. This could be used by other organizations to access your [place] medical records  [id number]        Your Vitals Were     Pulse Temperature Respirations Pulse Oximetry BMI (Body Mass Index)       75 98.6  F (37  C) (Oral) 16 95% 26.98 kg/m2        Blood Pressure from Last 3 Encounters:   05/25/18 111/75   04/10/18 125/81   03/30/18 137/83    Weight from Last 3 Encounters:   05/25/18 142 lb 12.8 oz (64.8 kg)   04/10/18 148 lb (67.1 kg)   03/30/18 149 lb (67.6 kg)              Today, you had the following     No orders found for display         Today's Medication Changes          These changes are accurate as of 5/25/18  3:46 PM.  If you have any questions, ask your nurse or doctor.               These medicines have changed or have updated prescriptions.        Dose/Directions    cyclobenzaprine 5 MG tablet   Commonly known as:  FLEXERIL   This may have changed:  when to take this   Used for:  Back muscle spasm    Changed by:  Carla Lanier APRN CNP        Dose:  5-10 mg   Take 1-2 tablets (5-10 mg) by mouth nightly as needed for muscle spasms   Quantity:  20 tablet   Refills:  0         Stop taking these medicines if you haven't already. Please contact your care team if you have questions.     carbamide peroxide 6.5 % otic solution   Commonly known as:  DEBROX   Stopped by:  Carla Lanier APRN CNP                Where to get your medicines      These medications were sent to [facility] [place], [place] - [address], [facility] [address]     Phone:  [phone]     cyclobenzaprine 5 MG tablet    DULoxetine 60 MG EC capsule    pantoprazole 40 MG EC tablet                Primary Care Provider Office Phone # Fax #    Herminia Esteban [phone]       [facility] [address]   [facility] [address]        Equal Access to Services     [facility] Center[facility] : Hadii herman Rocha, waaxda luanaid, qaybta kaalmada adevandanayacharlene, mary gonzalez . So [facility] [phone].    ATENCIÓN: Si hudsonla espmihir, tiene a castano disposición servicios gratuitos de asistencia lingüística. [facility] [phone].    We comply with applicable federal civil rights laws and [place] laws. We do not discriminate on the basis of race, color, national origin, age, disability, sex, sexual orientation, or gender identity.            Thank you!     Thank you for choosing [facility]  for your care. Our goal is always to provide you with excellent care. Hearing back from our patients is one way we can continue to improve our services. Please take a few minutes to complete the written survey that you may receive in the mail after your visit with us. Thank you!             Your Updated Medication List - Protect others around you: Learn how to safely use, store and throw away your medicines at www.disposemymeds.org.          This  list is accurate as of 5/25/18  3:46 PM.  Always use your most recent med list.                   Brand Name Dispense Instructions for use Diagnosis    acetaminophen 500 MG tablet    TYLENOL    100 tablet    Take 2 tablets (1,000 mg) by mouth every 8 hours as needed for mild pain    Chronic lumbar radiculopathy       carboxymethylcellulose 0.5 % Soln ophthalmic solution    REFRESH PLUS    1 Bottle    Place 1 drop into both eyes 3 times daily as needed for dry eyes    Dry eyes       cholecalciferol 1000 UNIT tablet    vitamin D3    100 tablet    Take 1 tablet (1,000 Units) by mouth daily    Vitamin D deficiency       cyclobenzaprine 5 MG tablet    FLEXERIL    20 tablet    Take 1-2 tablets (5-10 mg) by mouth nightly as needed for muscle spasms    Back muscle spasm       diclofenac 1 % Gel topical gel    VOLTAREN    100 g    Apply 4 grams to lower back four times daily using enclosed dosing card.    Chronic lumbar radiculopathy       DULoxetine 60 MG EC capsule    CYMBALTA    60 capsule    Take 1 capsule (60 mg) by mouth 2 times daily    Major depressive disorder, recurrent episode, moderate (H), Chronic lumbar radiculopathy       gabapentin 100 MG capsule    NEURONTIN    180 capsule    TAKE TWO CAPSULES BY MOUTH AT BEDTIME    Chronic pain syndrome       ibuprofen 800 MG tablet    ADVIL/MOTRIN    30 tablet    Take 1 tablet (800 mg) by mouth every 8 hours as needed for moderate pain    Back muscle spasm       ketotifen 0.025 % Soln ophthalmic solution    KP KETOTIFEN FUMARATE    1 Bottle    Place 1 drop into both eyes 2 times daily    Seasonal allergic conjunctivitis       loperamide 2 MG tablet    IMODIUM A-D    30 tablet    Take 2 tabs (4 mg) after first loose stool, and then take one tab (2 mg) after each diarrheal stool.  Max of 8 tabs (16 mg) per day.    Viral gastroenteritis       meclizine 25 MG tablet    ANTIVERT    30 tablet    Take 1 tablet (25 mg) by mouth 3 times daily as needed for dizziness    Vertigo        pantoprazole 40 MG EC tablet    PROTONIX    30 tablet    TAKE ONE TABLET BY MOUTH EVERY DAY 30-60 MINUTES BEFORE A MEAL    Gastroesophageal reflux disease without esophagitis       sodium chloride 0.65 % nasal spray    OCEAN    1 Bottle    Spray 1 spray into both nostrils daily as needed for congestion    Chronic seasonal allergic rhinitis due to other allergen       UNKNOWN TO PATIENT       Stephen

## 2018-05-26 ASSESSMENT — PATIENT HEALTH QUESTIONNAIRE - PHQ9: SUM OF ALL RESPONSES TO PHQ QUESTIONS 1-9: 13

## 2018-10-10 ENCOUNTER — OFFICE VISIT (OUTPATIENT)
Dept: FAMILY MEDICINE | Facility: CLINIC | Age: 78
End: 2018-10-10
Payer: COMMERCIAL

## 2018-10-10 VITALS
WEIGHT: 144 LBS | HEART RATE: 89 BPM | DIASTOLIC BLOOD PRESSURE: 83 MMHG | BODY MASS INDEX: 27.21 KG/M2 | TEMPERATURE: 98.5 F | RESPIRATION RATE: 16 BRPM | OXYGEN SATURATION: 99 % | SYSTOLIC BLOOD PRESSURE: 142 MMHG

## 2018-10-10 DIAGNOSIS — R22.2 SOFT TISSUE SWELLING OF CHEST WALL: Primary | ICD-10-CM

## 2018-10-10 NOTE — PROGRESS NOTES
"       HPI       Lou Winston is a 78 year old  who presents for   Chief Complaint   Patient presents with     swelling between breast bone     Musculoskeletal Problem     left hip and leg pain      Patient presents today for concern of \"swelling between breast bone.\" She denies pain, SOB, left sided chest pain, trouble swallowing, no change to overlying skin. No injuries or trauma or other known trigger per patient. Does not wear a bra with underwire.     Additional concern: chronic right leg pain for 15-16 years. Uses topical and oral pain medications. Gets worse during the winter so is starting to feel it act up. Summers are a good time for her.     A [employer]  was used for  this visit.    +++++++    Problem, Medication and Allergy Lists were reviewed and updated if needed..    Patient is an established patient of this clinic..         Review of Systems:   Review of Systems   Constitutional: Negative for fever.   HENT: Negative for trouble swallowing.    Respiratory: Negative for cough and shortness of breath.    Cardiovascular: Negative for chest pain and palpitations.   Musculoskeletal: Positive for arthralgias and myalgias.   Skin: Negative for color change, rash and wound.            Physical Exam:     Vitals:    10/10/18 1603   BP: 142/83   Pulse: 89   Resp: 16   Temp: 98.5  F (36.9  C)   TempSrc: Oral   SpO2: 99%   Weight: 144 lb (65.3 kg)     Body mass index is 27.21 kg/(m^2).  Vitals were reviewed and were normal     Physical Exam   Constitutional: No distress.   HENT:   Head: Normocephalic and atraumatic.   Cardiovascular: Normal rate, regular rhythm and normal heart sounds.    Pulmonary/Chest: Effort normal and breath sounds normal.       POC US completed with no sign of fluid collection or mass in area of concern.    Skin: She is not diaphoretic.   Psychiatric: She has a normal mood and affect.       Results:   No testing ordered today    Assessment and Plan        1. Soft tissue swelling " of chest wall  Patient is concerned of swelling and an underlying lump/mass at the xiphoid process of sternum. On exam, there is no visible mass or lump and none felt on palpation. A POCUS was completed which confirmed examination findings; no mass or lump. Patient was reassured and advised to return to clinic if she feels the swelling/lump is increasing in size.        Medications Discontinued During This Encounter   Medication Reason     meclizine (ANTIVERT) 25 MG tablet Stopped by Patient     sodium chloride (OCEAN) 0.65 % nasal spray Stopped by Patient     loperamide (IMODIUM A-D) 2 MG tablet Stopped by Patient     cholecalciferol (VITAMIN D3) 1000 UNIT tablet Stopped by Patient       Options for treatment and follow-up care were reviewed with the patient. Lou Winston  engaged in the decision making process and verbalized understanding of the options discussed and agreed with the final plan.      Lila Esteban MD  Family Medicine PGY3 Resident

## 2018-10-10 NOTE — Clinical Note
Would probably have done E3 since MDM was fairly straightforward and I'm not sure if we can bill for doing bedside imaging if not officially in the record. But you should let me know if I'm wrong about that. Love that you decided to put an ultrasound on the patient though!

## 2018-10-10 NOTE — NURSING NOTE
Due to patient being non-English speaking/uses sign language, an  was used for this visit. Only for face-to-face interpretation by an external agency, date and length of interpretation can be found on the scanned worksheet.   name:  name: Dalila Hurt  Phone number: [phone]  Agency: [employer]  Language: Panamanian   Type of interpretation: Face-to-face, spoken   Phone number: [phone]

## 2018-10-10 NOTE — PROGRESS NOTES
Preceptor Attestation:   Patient seen, evaluated and discussed with the resident. I have verified the content of the note, which accurately reflects my assessment of the patient and the plan of care.   Supervising Physician:  Radames Hall MD

## 2018-10-10 NOTE — MR AVS SNAPSHOT
After Visit Summary   10/10/2018    Lou Winston    MRN: [id number]           Patient Information     Date Of Birth          1940        Visit Information        Provider Department      10/10/2018 4:00 PM Herminia Esteban MD [facility]        Today's Diagnoses     Soft tissue swelling of chest wall    -  1       Follow-ups after your visit        Who to contact     Please call your clinic at [phone] to:    Ask questions about your health    Make or cancel appointments    Discuss your medicines    Learn about your test results    Speak to your doctor            Additional Information About Your Visit        Care EveryWhere ID     This is your Care EveryWhere ID. This could be used by other organizations to access your [place] medical records  [id number]        Your Vitals Were     Pulse Temperature Respirations Pulse Oximetry BMI (Body Mass Index)       89 98.5  F (36.9  C) (Oral) 16 99% 27.21 kg/m2        Blood Pressure from Last 3 Encounters:   10/10/18 142/83   05/25/18 111/75   04/10/18 125/81    Weight from Last 3 Encounters:   10/10/18 144 lb (65.3 kg)   05/25/18 142 lb 12.8 oz (64.8 kg)   04/10/18 148 lb (67.1 kg)              Today, you had the following     No orders found for display       Primary Care Provider Office Phone # Fax #    Herminia Esteban [phone]       [facility] [address] [address]        Equal Access to Services     [facility] : Hadii herman Rocha, waallida amparo, qaybta kaalmada erlinda, waxlynn neri feliciano vandana fry. So [facility] [phone].    ATENCIÓN: Si habla español, tiene a castano disposición servicios gratuitos de asistencia lingüística. Llame al [phone].    We comply with applicable federal civil rights laws and [place] laws. We do not discriminate on the basis of race, color, national origin, age, disability, sex, sexual orientation, or gender  identity.            Thank you!     Thank you for choosing [facility]  for your care. Our goal is always to provide you with excellent care. Hearing back from our patients is one way we can continue to improve our services. Please take a few minutes to complete the written survey that you may receive in the mail after your visit with us. Thank you!             Your Updated Medication List - Protect others around you: Learn how to safely use, store and throw away your medicines at www.disposemymeds.org.          This list is accurate as of 10/10/18 11:59 PM.  Always use your most recent med list.                   Brand Name Dispense Instructions for use Diagnosis    acetaminophen 500 MG tablet    TYLENOL    100 tablet    Take 2 tablets (1,000 mg) by mouth every 8 hours as needed for mild pain    Chronic lumbar radiculopathy       carboxymethylcellulose 0.5 % Soln ophthalmic solution    REFRESH PLUS    1 Bottle    Place 1 drop into both eyes 3 times daily as needed for dry eyes    Dry eyes       cyclobenzaprine 5 MG tablet    FLEXERIL    20 tablet    Take 1-2 tablets (5-10 mg) by mouth nightly as needed for muscle spasms    Back muscle spasm       diclofenac 1 % Gel topical gel    VOLTAREN    100 g    Apply 4 grams to lower back four times daily using enclosed dosing card.    Chronic lumbar radiculopathy       DULoxetine 60 MG EC capsule    CYMBALTA    60 capsule    Take 1 capsule (60 mg) by mouth 2 times daily    Major depressive disorder, recurrent episode, moderate (H), Chronic lumbar radiculopathy       gabapentin 100 MG capsule    NEURONTIN    180 capsule    TAKE TWO CAPSULES BY MOUTH AT BEDTIME    Chronic pain syndrome       ibuprofen 800 MG tablet    ADVIL/MOTRIN    30 tablet    Take 1 tablet (800 mg) by mouth every 8 hours as needed for moderate pain    Back muscle spasm       ketotifen 0.025 % Soln ophthalmic solution    KP KETOTIFEN FUMARATE    1 Bottle    Place 1 drop into both eyes 2  times daily    Seasonal allergic conjunctivitis       pantoprazole 40 MG EC tablet    PROTONIX    30 tablet    TAKE ONE TABLET BY MOUTH EVERY DAY 30-60 MINUTES BEFORE A MEAL    Gastroesophageal reflux disease without esophagitis       UNKNOWN TO PATIENT       Stephen

## 2018-10-11 ASSESSMENT — ENCOUNTER SYMPTOMS
COUGH: 0
TROUBLE SWALLOWING: 0
PALPITATIONS: 0
COLOR CHANGE: 0
SHORTNESS OF BREATH: 0
MYALGIAS: 1
WOUND: 0
ARTHRALGIAS: 1
FEVER: 0

## 2018-12-13 ENCOUNTER — OFFICE VISIT (OUTPATIENT)
Dept: FAMILY MEDICINE | Facility: CLINIC | Age: 78
End: 2018-12-13
Payer: COMMERCIAL

## 2018-12-13 VITALS
TEMPERATURE: 98.5 F | HEART RATE: 83 BPM | BODY MASS INDEX: 27.25 KG/M2 | WEIGHT: 144.2 LBS | RESPIRATION RATE: 16 BRPM | SYSTOLIC BLOOD PRESSURE: 120 MMHG | OXYGEN SATURATION: 99 % | DIASTOLIC BLOOD PRESSURE: 76 MMHG

## 2018-12-13 DIAGNOSIS — R10.11 ABDOMINAL PAIN, RIGHT UPPER QUADRANT: Primary | ICD-10-CM

## 2018-12-13 DIAGNOSIS — Z00.00 HEALTHCARE MAINTENANCE: ICD-10-CM

## 2018-12-13 DIAGNOSIS — K21.9 GASTROESOPHAGEAL REFLUX DISEASE WITHOUT ESOPHAGITIS: ICD-10-CM

## 2018-12-13 RX ORDER — PANTOPRAZOLE SODIUM 40 MG/1
40 TABLET, DELAYED RELEASE ORAL
Qty: 60 TABLET | Refills: 0 | Status: SHIPPED | OUTPATIENT
Start: 2018-12-13 | End: 2019-01-10

## 2018-12-13 ASSESSMENT — ENCOUNTER SYMPTOMS
FATIGUE: 0
DYSURIA: 0
CHILLS: 0
UNEXPECTED WEIGHT CHANGE: 0
BLOOD IN STOOL: 0
VOMITING: 0
NAUSEA: 0
DIAPHORESIS: 0
SHORTNESS OF BREATH: 0
ABDOMINAL PAIN: 1
CONSTIPATION: 0
FEVER: 0
DIARRHEA: 0

## 2018-12-13 NOTE — NURSING NOTE
Injectable influenza vaccine documentation    1. Has the patient received the information for the influenza vaccine? YES    2. Does the patient have a severe allergy to eggs (Patients with a severe egg allergy should be assessed by a medical provider, RN, or clinical pharmacist. If they receive the influenza vaccine, please have them observed for 15 minutes.)? No    3. Has the patient had an allergic reaction to previous influenza vaccines? No    4. Has the patient had any severe allergic reactions to past influenza vaccines ? No       5. Does patient have a history of Guillain-Stephens syndrome? No      Based on responses above, I administered the influenza vaccine.  Mirela Nieto, CMA

## 2018-12-13 NOTE — Clinical Note
She did have helicobacter seen on her biopsies! I don't know where my mind was when I looked at those results. Now it makes sense why she got the antibiotic treatment.Lila

## 2018-12-13 NOTE — PATIENT INSTRUCTIONS
Increase pantoprazole to twice a day.     Get the ultrasound of your stomach.     Here is the plan from today's visit    1. Abdominal pain, right upper quadrant  - US Abdomen Limited; Future    2. Gastroesophageal reflux disease without esophagitis  - pantoprazole (PROTONIX) 40 MG EC tablet; Take 1 tablet (40 mg) by mouth 2 times daily (before meals) TAKE ONE TABLET BY MOUTH EVERY DAY 30-60 MINUTES BEFORE A MEAL  Dispense: 60 tablet; Refill: 0      Please call or return to clinic if your symptoms don't go away.    Follow up plan 1 month    Thank you for coming to [facility] today.  Lab Testing:  **If you had lab testing today and your results are reassuring or normal they will be mailed to you or sent through [employer] within 7 days.   **If the lab tests need quick action we will call you with the results.  The phone number we will call with results is # [phone] (home) . If this is not the best number please call our clinic and change the number.  Medication Refills:  If you need any refills please call your pharmacy and they will contact us.   If you need to  your refill at a new pharmacy, please contact the new pharmacy directly. The new pharmacy will help you get your medications transferred faster.   Scheduling:  If you have any concerns about today's visit or wish to schedule another appointment please call our office during normal business hours [phone] (8-5:00 M-F)  If a referral was made to a [facility] and you don't get a call from central scheduling please call [phone].  If a Mammogram was ordered for you at [facility] call [phone] to schedule or change your appointment.  If you had an XRay/CT/Ultrasound/MRI ordered the number is [phone] to schedule or change your radiology appointment.   Medical Concerns:  If you have urgent medical concerns please call [phone] at any time of the day.    Lila Esteban MD

## 2018-12-13 NOTE — NURSING NOTE
Due to patient being non-English speaking/uses sign language, an  was used for this visit. Only for face-to-face interpretation by an external agency, date and length of interpretation can be found on the scanned worksheet.     name: Dalila Hurt  Agency: [employer]  Language: [facility]  Telephone number: [phone]  Type of interpretation: Face-to-face, spoken     Mirela Nieto CMA

## 2018-12-13 NOTE — PROGRESS NOTES
HPI       Lou Winston is a 78 year old  who presents for   Chief Complaint   Patient presents with     Heartburn     her acid reflux has not improved over the last 2 months. No nausea,vomiting or diarrhea. Some bloating. Still occuring after greasy/spicy foods. The Protonix is not helping and takes 2 occasionally per pt.      Patient presents for follow-up of GERD.  She reports worsening of symptoms over the last 2 months.  Will experience pressure and burning pain under both breasts that then radiates up the midline of her chest.  No nausea, vomiting, diarrhea, constipation.  Pain is exacerbated with spicy foods, oranges, greasy foods.  No fevers.  She takes her pantoprazole once daily and does not miss any doses.  She has taken 2 on certain days and reports some improvement with taking 2 pills.     Of note, patient had an upper endoscopy completed 5/26/2015 which showed chronic gastritis.  Biopsies were taken which were notable for Helicobacter organisms present in the stomach and esophagus.  Patient reports she was treated with antibiotics for 2 weeks with plan to continue with pantoprazole after this.    A [employer]  was used for  this visit.    +++++++    Problem, Medication and Allergy Lists were reviewed and updated if needed..    Patient is an established patient of this clinic..         Review of Systems:   Review of Systems   Constitutional: Negative for chills, diaphoresis, fatigue, fever and unexpected weight change.   Respiratory: Negative for shortness of breath.    Cardiovascular: Negative for chest pain.   Gastrointestinal: Positive for abdominal pain. Negative for blood in stool, constipation, diarrhea, nausea and vomiting.   Genitourinary: Negative for dysuria.            Physical Exam:     Vitals:    12/13/18 1605   BP: 120/76   Pulse: 83   Resp: 16   Temp: 98.5  F (36.9  C)   TempSrc: Oral   SpO2: 99%   Weight: 65.4 kg (144 lb 3.2 oz)     Body mass index is 27.25 kg/m .  Vitals  were reviewed and were normal     Physical Exam   Constitutional: No distress.   HENT:   Head: Normocephalic and atraumatic.   Cardiovascular: Normal rate and regular rhythm.   Pulmonary/Chest: Effort normal and breath sounds normal.   Abdominal: Soft. She exhibits no distension and no mass. There is tenderness (RUQ, epigastric, and LUQ region). There is no guarding.   Neurological: She is alert.   Skin: She is not diaphoretic.   Psychiatric: She has a normal mood and affect. Her behavior is normal.         Results:   No testing ordered today    Assessment and Plan        1. Abdominal pain, right upper quadrant  2. Gastroesophageal reflux disease without esophagitis  Patient has a history of chronic gastritis and H. pylori for which she was treated.  She has worsening of her reflux over the last 2 months with upper abdominal pain which is exacerbated by certain foods.  Will trial pantoprazole 40 mg twice daily for 4 weeks.  We will also order an ultrasound to examine the gallbladder for gallstones as this pain may be due to biliary colic.  If symptoms do not improve after 4 weeks, may consider referring to GI.  Also must consider angina on the differential as women tend to present in an  atypical nature when experiencing angina. Discussed flu shot and patient agreed to have administered today  -US Abdomen Limited; Future  - pantoprazole (PROTONIX) 40 MG EC tablet; Take 1 tablet (40 mg) by mouth 2 times daily (before meals) TAKE ONE TABLET BY MOUTH EVERY DAY 30-60 MINUTES BEFORE A MEAL  Dispense: 60 tablet; Refill: 0    3. Healthcare maintenance  Discussed flu shot and patient agreed to have administered today.  - ADMIN VACCINE, INITIAL  - FLU VACCINE, INCREASED ANTIGEN, PRESV FREE       There are no discontinued medications.    Options for treatment and follow-up care were reviewed with the patient. Lou Winston  engaged in the decision making process and verbalized understanding of the options discussed and  agreed with the final plan.    Lila Esteban MD  Family Medicine PGY3 Resident

## 2018-12-13 NOTE — PROGRESS NOTES
Preceptor Attestation:   Patient seen, evaluated and discussed with the resident. I have verified the content of the note, which accurately reflects my assessment of the patient and the plan of care.   Supervising Physician:  Samantha Ibanez MD

## 2018-12-20 ENCOUNTER — HOSPITAL ENCOUNTER (OUTPATIENT)
Dept: ULTRASOUND IMAGING | Facility: CLINIC | Age: 78
Discharge: HOME | End: 2018-12-20
Attending: FAMILY MEDICINE | Admitting: FAMILY MEDICINE
Payer: COMMERCIAL

## 2018-12-20 DIAGNOSIS — R10.11 ABDOMINAL PAIN, RIGHT UPPER QUADRANT: ICD-10-CM

## 2018-12-20 PROCEDURE — 76705 ECHO EXAM OF ABDOMEN: CPT

## 2019-01-02 ENCOUNTER — OFFICE VISIT (OUTPATIENT)
Dept: FAMILY MEDICINE | Facility: CLINIC | Age: 79
End: 2019-01-02
Payer: COMMERCIAL

## 2019-01-02 VITALS
HEART RATE: 86 BPM | SYSTOLIC BLOOD PRESSURE: 139 MMHG | OXYGEN SATURATION: 100 % | BODY MASS INDEX: 27.57 KG/M2 | DIASTOLIC BLOOD PRESSURE: 86 MMHG | RESPIRATION RATE: 16 BRPM | WEIGHT: 145.9 LBS | TEMPERATURE: 98.5 F

## 2019-01-02 DIAGNOSIS — K21.9 GASTROESOPHAGEAL REFLUX DISEASE, ESOPHAGITIS PRESENCE NOT SPECIFIED: Primary | ICD-10-CM

## 2019-01-02 ASSESSMENT — PATIENT HEALTH QUESTIONNAIRE - PHQ9: SUM OF ALL RESPONSES TO PHQ QUESTIONS 1-9: 2

## 2019-01-02 NOTE — PATIENT INSTRUCTIONS
Your ultrasound showed no stones in your gallbladder.     Follow up on January 10, 2019 for your complete physical.

## 2019-01-02 NOTE — NURSING NOTE
Due to patient being non-English speaking/uses sign language, an  was used for this visit. Only for face-to-face interpretation by an external agency, date and length of interpretation can be found on the scanned worksheet.     name: Dalila Hurt  Agency: [employer]  Language: Oromo   Telephone number:    Type of interpretation: Face-to-face, spoken    Mirela Nieto CMA

## 2019-01-02 NOTE — PROGRESS NOTES
Preceptor Attestation:   Patient seen, evaluated and discussed with the resident.   I have verified the content of the note, which accurately reflects my assessment of the patient and the plan of care.   Supervising Physician:  Ian Arriaga MD

## 2019-01-02 NOTE — PROGRESS NOTES
FLOWER Winston is a 79 year old  who presents for   Chief Complaint   Patient presents with     RECHECK     US results of the abdomen on 12/20/18     Patient presents for follow up abd US. She was seen in clinic on 12/13/2018 for epigastric and RUQ abdominal pain. Visit note reviewed, see note for further details. At this visit, patient's pantoprazole was increased from 40mg daily to 40mg BID. She also reported RUQ abd pain, worse with food, and had tenderness in this area on exam. Therefore a RUQ US was obtained. The US did not show any gallstones, it did show hepatic steatosis.     Since our last visit, she reports an improvement in her abdominal pain with taking pantoprazole 40mg BID. She is no longer experiencing abd pain with food. No nausea or vomiting.     A [employer]  was used for  this visit.    +++++++    Problem, Medication and Allergy Lists were reviewed and updated if needed..    Patient is an established patient of this clinic..         Review of Systems:   Review of Systems   Constitutional: Negative for chills and fever.   Respiratory: Negative for shortness of breath.    Cardiovascular: Negative for chest pain.   Gastrointestinal: Negative for abdominal distention, abdominal pain, blood in stool, constipation, diarrhea, nausea and vomiting.          Physical Exam:     Vitals:    01/02/19 1606 01/02/19 1609   BP: 157/80 139/86   Pulse: 86    Resp: 16    Temp: 98.5  F (36.9  C)    TempSrc: Oral    SpO2: 100%    Weight: 66.2 kg (145 lb 14.4 oz)      Body mass index is 27.57 kg/m .  Vital signs normal except BP elevated, improved on recheck.      Physical Exam   Constitutional: No distress.   HENT:   Head: Normocephalic and atraumatic.   Pulmonary/Chest: Effort normal.   Abdominal: Soft. There is no tenderness.   Neurological: She is alert.   Skin: She is not diaphoretic.   Psychiatric: She has a normal mood and affect. Her behavior is normal.       Results:     EXAMINATION:  Limited Abdominal Ultrasound, 12/20/2018 9:38 AM      COMPARISON: Ultrasound 11/7/2006     HISTORY: Right upper quadrant pain, worse with food, evaluate for  gallstones     FINDINGS:   Fluid: No evidence of ascites or pleural effusions.     Liver: The liver diffuse increased echogenicity, measuring 15.9 cm in  craniocaudal dimension . Anechoic, avascular lesion with posterior  acoustic enhancement measures approximately 0.9 x 0.9 x 0.8 cm within  the left hepatic lobe and is compatible with a simple hepatic cyst.  The main portal vein is patent and is in normal antegrade fashion.     Gallbladder: There is no wall thickening, pericholecystic fluid,  positive sonographic Villegas's sign or evidence for cholelithiasis.     Bile Ducts: Both the intra- and extrahepatic biliary system are of  normal caliber.  The common bile duct measures 3 mm in diameter.     Pancreas: Visualized portions of the head and body of the pancreas are  unremarkable.      Kidney: The right kidney measures 10.3 cm long. There is no  hydronephrosis or hydroureter, no shadowing renal calculi, cystic  lesion or mass.                                                                    IMPRESSION:   1.  Hepatic steatosis.  2.  No cholelithiasis as questioned.  3. Simple hepatic cyst.  Assessment and Plan      1. Gastroesophageal reflux disease, esophagitis presence not specified  Symptoms improved with taking Pantoprazole 40mg BID. This was increased from 40mg daily on 12/13. Will plan to continue for 1 month total at BID dosing. Following this, will decrease back to 40mg daily. If symptoms worsen, will refer patient to GI for upper endoscopy to evaluate for esophagitis, cortez's esophogus, PUD, etc. Additionally, although RUQ US showed no gallstones, will consider HIDA scan to evaluate gallbladder function if patient's RUQ abd pain exacerbated by food returns.        There are no discontinued medications.    Options for treatment and follow-up care  were reviewed with the patient. Lou Winston  engaged in the decision making process and verbalized understanding of the options discussed and agreed with the final plan.    Lila Esteban MD  Family Medicine PGY3 Resident

## 2019-01-03 ASSESSMENT — ENCOUNTER SYMPTOMS
NAUSEA: 0
SHORTNESS OF BREATH: 0
CONSTIPATION: 0
ABDOMINAL PAIN: 0
BLOOD IN STOOL: 0
VOMITING: 0
ABDOMINAL DISTENTION: 0
FEVER: 0
CHILLS: 0
DIARRHEA: 0

## 2019-01-10 ENCOUNTER — OFFICE VISIT (OUTPATIENT)
Dept: PHARMACY | Facility: CLINIC | Age: 79
End: 2019-01-10
Payer: COMMERCIAL

## 2019-01-10 ENCOUNTER — OFFICE VISIT (OUTPATIENT)
Dept: FAMILY MEDICINE | Facility: CLINIC | Age: 79
End: 2019-01-10
Payer: COMMERCIAL

## 2019-01-10 VITALS
SYSTOLIC BLOOD PRESSURE: 135 MMHG | BODY MASS INDEX: 27.96 KG/M2 | HEART RATE: 77 BPM | TEMPERATURE: 97.5 F | DIASTOLIC BLOOD PRESSURE: 81 MMHG | OXYGEN SATURATION: 96 % | WEIGHT: 148 LBS

## 2019-01-10 VITALS
BODY MASS INDEX: 27.96 KG/M2 | OXYGEN SATURATION: 96 % | SYSTOLIC BLOOD PRESSURE: 135 MMHG | HEART RATE: 77 BPM | TEMPERATURE: 97.5 F | WEIGHT: 148 LBS | DIASTOLIC BLOOD PRESSURE: 81 MMHG

## 2019-01-10 DIAGNOSIS — H04.123 DRY EYES: ICD-10-CM

## 2019-01-10 DIAGNOSIS — K21.9 GASTROESOPHAGEAL REFLUX DISEASE WITHOUT ESOPHAGITIS: ICD-10-CM

## 2019-01-10 DIAGNOSIS — Z00.00 MEDICARE ANNUAL WELLNESS VISIT, SUBSEQUENT: Primary | ICD-10-CM

## 2019-01-10 DIAGNOSIS — K21.00 GASTROESOPHAGEAL REFLUX DISEASE WITH ESOPHAGITIS: Primary | ICD-10-CM

## 2019-01-10 RX ORDER — CARBOXYMETHYLCELLULOSE SODIUM 5 MG/ML
1 SOLUTION/ DROPS OPHTHALMIC 3 TIMES DAILY PRN
Qty: 1 BOTTLE | Refills: 6 | Status: SHIPPED | OUTPATIENT
Start: 2019-01-10 | End: 2019-04-05

## 2019-01-10 RX ORDER — PANTOPRAZOLE SODIUM 40 MG/1
40 TABLET, DELAYED RELEASE ORAL
Qty: 60 TABLET | Refills: 0 | Status: SHIPPED | OUTPATIENT
Start: 2019-01-10 | End: 2019-01-10

## 2019-01-10 RX ORDER — PANTOPRAZOLE SODIUM 40 MG/1
40 TABLET, DELAYED RELEASE ORAL DAILY
Qty: 30 TABLET | Refills: 0 | Status: SHIPPED | OUTPATIENT
Start: 2019-01-10 | End: 2019-01-23

## 2019-01-10 NOTE — NURSING NOTE
Due to patient being non-English speaking/uses sign language, an  was used for this visit. Only for face-to-face interpretation by an external agency, date and length of interpretation can be found on the scanned worksheet.     name: suhas burgos  Agency: [employer]  Language: Oromo   Telephone number: [phone]  Type of interpretation: Face-to-face, spoken

## 2019-01-10 NOTE — PROGRESS NOTES
Medicare Annual Wellness Visit         HPI     This 79 year old female presents as an established patient  Herminia Esteban who presents for an subsequent Medicare Wellness Exam.    Patient also reports chronic back pain  GERD symptoms improved on pantoprazole 40mg BID. Has been on this high dose for about 1 month.     A [employer]  was used for  this visit.      Patient Active Problem List   Diagnosis     Health Care Home     Anxiety disorder     Chronic post-traumatic stress disorder     Chronic tension type headache     Cognitive disorder     Herniated disc     Limb pain     Lower back pain     Lumbago     Lumbar radiculopathy     Vitamin D deficiency     Major depressive disorder, recurrent episode, moderate (H)     H. pylori infection     Osteopenia     Subclinical hyperthyroidism     Gastroesophageal reflux disease with esophagitis     Prediabetes     Chronic pain syndrome     Chronic lumbar radiculopathy     Past Medical History:   Diagnosis Date     Depressive disorder        Family History   Family history unknown: Yes         Past Surgical History:   Procedure Laterality Date     NO HISTORY OF SURGERY         Reviewed no other significant FH    Family History and past Medical History reviewed and it is unchanged/updated.       Review of Systems     Constitutional:   fevers, night sweats or unintentional weight change ?  NO      Eyes:   vision change, diplopia or red eyes?  NO      Ears, Nose, Mouth, Throat:   tinnitus or hearing change,  epistaxis or nasal discharge,  oral lesions, throat pain ?  NO      Neck:   stiffness?  NO           Cardiovascular:   chest pain, palpitations, or pain with walking, orthopnea or PND?  NO   Breasts:  Any bumps or unusual discharge?     NO         Respiratory:   dyspnea, cough, shortness of breath or wheezing?  NO    someimtes SOB  GI:   nausea, vomiting, diarrhea or constipation,  abdominal pain ?  YES-reflux         :   change in urine,  dysuria or hematuria,   sexual dysfunction ?  NO        Musculoskeletal:   joint or muscle pain or swelling?  YES, back pain (chronic)            Skin:   concerning lesions or moles?  NO           Nervous System:   loss of strength or sensation,  numbness or tingling,  tremor,  dizziness,  headache?  NO   Endocrine/Homone:   polyuria or polydipsia,  temperature intolerance?  NO            Blood and Lymphnodes:   concerning bumps,  bleeding problems?  NO            Allergy:   environmental allergies?  NO            Mental Health:   depression or anxiety,  sleep problems?  NO               Medical Care     Have you been to an ER or a hospital in the last year? No  What other specialists or organizations are involved in your medical care?  No others  Current providers sharing in care for this patient include:  Patient Care Team:  Herminia Esteban MD as PCP - General (Student in organized health care education/training program)  Margarita Lopes RN as Clinic Care Coordinator         Social History     Social History     Tobacco Use     Smoking status: Never Smoker     Smokeless tobacco: Never Used   Substance Use Topics     Alcohol use: No     Marital Status:  Who lives in your household? 4 children  Does your home have any of the following safety concerns? Loose rugs in the hallway, no grab bars in the bathroom, no handrails on the stairs or have poorly lit areas?  No  Do you feel threatened or controlled by a partner, ex-partner or anyone in your life? No  Has anyone hurt you physically, for example by pushing, hitting, slapping or kicking you   or forcing you to have sex? No  Do you need help with the phone, transportation, shopping, preparing meals, housework, laundry, medications or managing money? Yes has help from neighbor   Have you noticed any hearing difficulties? No      Risk Behaviors and Healthy Habits     How many servings of fruits and vegetables do you eat a day? Yes, eats many  How often do you exercise and what do you  do? Yes, half hours 3-4 times per week  Do you frequently ride without a seatbelt? No  Do you use tobacco?  No  Do you use any other drugs? No         Do you use alcohol?No    Today's PHQ-2 Score: 0    Sexual Health     Are you sexually active?  No   Have you had any sexually transmitted infections? No   Any sexual concerns? No     FOR WOMEN  What year did you stop having periods? Does not remember  Any vaginal bleeding in the last year? No  Have you ever had an abnormal Pap smear? Is not sure if she has ever had one.     FUNCTIONAL ABILITY/SAFETY SCREENING     Fall Risk Assessment Today:  not completed, patient not cooperative to complete.     Hearing evaluation if done: No    EVALUATION OF COGNITIVE FUNCTION     Mood/affect:Normal  Appearance:Normal  Family member/caregiver input: no family member present    Mini Cog Scoring   Unable to complete Mini Cog as patient did not cooperate to complete.   Clock Draw Test result:  Unable to complete as mentioned above    SCREENING FOR PREVENTION and EARLY DETECTION     ECG (if done)not performed    Corrected Visual acuity: not performed    Cervical cancer screening:  Covered  until age 70 every 2 years unless high risk):  Testing not indicated   Colon CA Screening (>50-75 ) (FITT annually or colonoscopy every 10years):  Testing not indicated   Breast CA Screenin-2 years 50-74years:  Testing not indicated     CV Risk based on Pooled Cohort Risk:  The 10-year ASCVD risk score (Bentonjason WHITE Jr., et al., 2013) is: 12.3%    Values used to calculate the score:      Age: 79 years      Sex: Female      Is Non- : Yes      Diabetic: No      Tobacco smoker: No      Systolic Blood Pressure: 135 mmHg      Is BP treated: No      HDL Cholesterol: 37 mg/dL      Total Cholesterol: 139 mg/dL    Advanced Directives: Discussed and patient desires to to have further information and discuss with family.      Immunization History   Administered Date(s) Administered      DTaP, Unspecified 05/11/2017     FLU 6-35 months 01/31/2013, 09/30/2013     Flu 65+ Years 10/30/2014     HepA-Adult 05/11/2017     HepB 04/21/2008, 09/15/2008, 02/23/2010     Influenza (High Dose) 3 valent vaccine 01/17/2017, 02/19/2018, 12/13/2018     Influenza (IIV3) PF 01/03/2007, 11/13/2007, 02/23/2010, 11/04/2010, 10/11/2011, 01/31/2013, 09/30/2013     Influenza Vaccine, 3 YRS +, IM (QUADRIVALENT W/PRESERVATIVES) 10/30/2014     MMR 04/21/2008, 04/01/2011     Pneumo Conj 13-V (2010&after) 01/17/2017     Pneumococcal 23 valent 07/12/2007     Poliovirus, inactivated (IPV) 10/25/2006     TD (ADULT, 7+) 01/03/2007, 09/25/2007, 04/21/2008     TDAP Vaccine (Adacel) 05/11/2017     Typhoid IM 05/11/2017     Varicella 01/03/2007, 07/03/2007, 07/12/2007       Reviewed Immunization Record Today  Pneumoccocal Vaccine: No  Varicella Vaccine: No  TDaP:No         Physical Exam     Vitals: /81   Pulse 77   Temp 97.5  F (36.4  C)   Wt 67.1 kg (148 lb)   SpO2 96%   BMI 27.96 kg/m    BMI= Body mass index is 27.96 kg/m .     GENERAL APPEARANCE: healthy, alert and no distress  EYES: Eyes grossly normal to inspection, PERRL and conjunctivae and sclerae normal  HENT: ear canals and TM's normal, nose and mouth without ulcers or lesions, oropharynx clear and oral mucous membranes moist  NECK: no adenopathy, no asymmetry, masses, or scars and thyroid normal to palpation  RESP: lungs clear to auscultation - no rales, rhonchi or wheezes  BREAST: not examined  CV: regular rate and rhythm, normal S1 S2, no S3 or S4, no murmur, click or rub, no peripheral edema  ABDOMEN: soft, nontender, no hepatosplenomegaly, no masses  MS: no musculoskeletal defects are noted and gait is age appropriate without ataxia  SKIN: no suspicious lesions or rashes  NEURO: Normal strength and tone, sensory exam grossly normal, mentation intact and speech normal  PSYCH: mentation appears normal and affect normal/bright        Assessment and Plan    subsequent   Medicare Wellness Exam  1. Health Care Maintenance: Normal Physical Exam    PLAN:  1. Reviewed [place]'s Preventive Services form with patient and preventive service plan  2. Routine follow up in one year  2. Discuss advance directive planning with follow up visit  3. Complete portions of AWV at future visit which were unable to be completed today (Mini-cog, get up and go test, clock drawing, hearing, vision)  4. Refresh eye drops refilled  5. Pantoprazole: decrease dose from 40mg BID to 40mg daily. Follow up in 2 weeks. If worsening of GERD, will refer to GI for upper endoscopy.     Lou was seen today for medicare visit.    Diagnoses and all orders for this visit:    Medicare annual wellness visit, subsequent    Gastroesophageal reflux disease without esophagitis  -     Discontinue: pantoprazole (PROTONIX) 40 MG EC tablet; Take 1 tablet (40 mg) by mouth 2 times daily (before meals) TAKE ONE TABLET BY MOUTH EVERY DAY 30-60 MINUTES BEFORE A MEAL  -     pantoprazole (PROTONIX) 40 MG EC tablet; Take 1 tablet (40 mg) by mouth daily TAKE ONE TABLET BY MOUTH EVERY DAY 30-60 MINUTES BEFORE A MEAL    Dry eyes  -     carboxymethylcellulose (REFRESH PLUS) 0.5 % SOLN ophthalmic solution; Place 1 drop into both eyes 3 times daily as needed for dry eyes      Options for treatment and follow-up care were reviewed with the Lou Winston and/or guardian engaged in the decision making process and verbalized understanding of the options discussed and agreed with the final plan.    Lila Esteban MD  Family Medicine PGY3 Resident

## 2019-01-10 NOTE — NURSING NOTE
Due to patient being non-English speaking/uses sign language, an  was used for this visit. Only for face-to-face interpretation by an external agency, date and length of interpretation can be found on the scanned worksheet.     name: suhas burgos  Agency: [employer]  Language: Oromo   Telephone number: [phone]  Type of interpretation: Face-to-face, spoken

## 2019-01-10 NOTE — PROGRESS NOTES
Clinical Pharmacy Wellness Note     Lou was referred by Dr. Esteban for an annual wellness encounter       MEDICATION REVIEW:  Discussed all medication indications, dosage and effectiveness, adverse effects, and adherence with patient/caregiver.    Pt had meds with them: no  Pt had med list with them: no  Pt was knowledgeable about meds: yes but unclear on the name and identifies by color  Medications set up by: self  Medications administered by someone else (e.g., [facility]): No  Pt uses a medication box or automated dispenser: no  Called pharmacy to obtain or clarify med list:  no  Called [facility] or [facility] to obtain or clarify med list:  no    Medication Discrepancies  Medications on EMR med list that pt is NOT taking:  ketaprofen eye drops , flexaril  Medications pt IS taking that are NOT on EMR med list (e.g., from specialist, hospital): none  OTC meds/ dietary supplements pt taking on own that are NOT on EMR med list:  none  Dosage listed differently than how patient is taking: none  Frequency listed differently than how patient is taking: none  Duplicate medication on list (two occurrences of the same medication):  none  TOTAL NUMBER OF MEDICATION DISCREPANCIES:  0  ______________________________________________________________________      Subjective:  Lou is here today for an annual wellness assessment by Dr. Esteban    Med Rec:    1)  Pain ;     Currently takes duloxetine 60 mg twice daily.    Patient also uses Voltaren gel applied as needed.    Current acetaminophen dose is 1000 mg 3 times daily.  She reports that on occasion when pain is worse she will take some extra tablets.  She is not aware of a max dose for this medication.  She does not describe how many additional tablets she may take.    2) GERD  ;  Continues to take pantoprazole 40 mg 1 time daily.    3) gabapentin    Patient discontinue the use of gabapentin.  She felt this made her reflux worse.      Patient reported being compliant all of the time    Patient reports no side effects.      Objective:    /81   Pulse 77   Temp 97.5  F (36.4  C)   Wt 67.1 kg (148 lb)   SpO2 96%   BMI 27.96 kg/m    GFR Estimate   Date Value Ref Range Status   01/27/2016 82 >60 mL/min/1.7m2 Final     Comment:     Non  GFR Calc   06/14/2014 >90 >60 mL/min/1.7m2 Final   08/21/2012 >90 >60 mL/min/1.7m2 Final     GFR Estimate If Black   Date Value Ref Range Status   01/27/2016 >90   GFR Calc   >60 mL/min/1.7m2 Final   06/14/2014 >90 >60 mL/min/1.7m2 Final   08/21/2012 >90 >60 mL/min/1.7m2 Final       Patient Active Problem List   Diagnosis     Health Care Home     Anxiety disorder     Chronic post-traumatic stress disorder     Chronic tension type headache     Cognitive disorder     Herniated disc     Limb pain     Lower back pain     Lumbago     Lumbar radiculopathy     Vitamin D deficiency     Major depressive disorder, recurrent episode, moderate (H)     H. pylori infection     Osteopenia     Subclinical hyperthyroidism     Gastroesophageal reflux disease with esophagitis     Prediabetes     Chronic pain syndrome     Chronic lumbar radiculopathy     SOCIAL HISTORY:   reports that  has never smoked. she has never used smokeless tobacco. She reports that she does not drink alcohol or use drugs.    Current Outpatient Medications   Medication Sig Dispense Refill     acetaminophen (TYLENOL) 500 MG tablet Take 2 tablets (1,000 mg) by mouth every 8 hours as needed for mild pain 100 tablet 11     cyclobenzaprine (FLEXERIL) 5 MG tablet Take 1-2 tablets (5-10 mg) by mouth nightly as needed for muscle spasms (Patient not taking: Reported on 1/10/2019) 20 tablet 0     diclofenac (VOLTAREN) 1 % GEL topical gel Apply 4 grams to lower back four times daily using enclosed dosing card. 100 g 11     DULoxetine (CYMBALTA) 60 MG EC capsule Take 1 capsule (60 mg) by mouth 2 times daily 60 capsule 11     ketotifen (KP KETOTIFEN FUMARATE) 0.025 % SOLN ophthalmic  solution Place 1 drop into both eyes 2 times daily (Patient not taking: Reported on 1/10/2019) 1 Bottle 3     UNKNOWN TO PATIENT        carboxymethylcellulose (REFRESH PLUS) 0.5 % SOLN ophthalmic solution Place 1 drop into both eyes 3 times daily as needed for dry eyes 1 Bottle 6     pantoprazole (PROTONIX) 40 MG EC tablet Take 1 tablet (40 mg) by mouth daily TAKE ONE TABLET BY MOUTH EVERY DAY 30-60 MINUTES BEFORE A MEAL 30 tablet 0     No Known Allergies    Environmental factors that may impact patient: none.    /81   Pulse 77   Temp 97.5  F (36.4  C)   Wt 67.1 kg (148 lb)   SpO2 96%   BMI 27.96 kg/m      Drug Therapy Assessment:  Drug therapy problems identified:     I have reviewed All medications taken by the Lou.  Medications were reviewed and assessed for indicated, effective, safe and convenient. Drug therapy problem identified today listed described below:          Drug Therapy Plan and Follow up:    Plan  Medication list was updated today to reflect the medication reconciliation performed.       REFILLS:  Refill sent today for lubricant eyedrops, and pantoprazole 40 mg.        Follow up: with PCP as directed   Patient was provided with written instructions/medication list via AVS        Options for treatment and/or follow-up care were reviewed with the patient. Patient was engaged and actively involved in the decision making process.  Lou Winston verbalized understanding of the options discussed and was satisfied with the final plan.      Dr. Esteban  was provided my action today in clinic and Dr. Arriaga was available for supervision during this visit and is the authorizing prescriber for this visit through the pharmacist collaborative practice agreement.      Conrad Phelps, Pharm.D                     Total Time: 20  # DTPs Identified: 0    Medical Condition 1: GERD, Goals of therapy: At Goal,  ,  ,  ,  ,  ,  ,     ,  ,  ,  ,  ,  ,  ,  ,     ,  ,  ,  ,  ,  ,  ,  ,     ,  ,  ,

## 2019-01-10 NOTE — PATIENT INSTRUCTIONS
Preventive Health Recommendations    See your health care provider every year to    Review health changes.     Discuss preventive care.      Review your medicines if your doctor has prescribed any.      You no longer need a yearly Pap test unless you've had an abnormal Pap test in the past 10 years. If you have vaginal symptoms, such as bleeding or discharge, be sure to talk with your provider about a Pap test.      Every 1 to 2 years, have a mammogram.  If you are over 69, talk with your health care provider about whether or not you want to continue having screening mammograms.      Every 10 years, have a colonoscopy. Or, have a yearly FIT test (stool test). These exams will check for colon cancer.       Have a cholesterol test every 5 years, or more often if your doctor advises it.       Have a diabetes test (fasting glucose) every three years. If you are at risk for diabetes, you should have this test more often.       At age 65, have a bone density scan (DEXA) to check for osteoporosis (brittle bone disease).    Shots:    Get a flu shot each year.    Get a tetanus shot every 10 years.    Talk to your doctor about your pneumonia vaccines. There are now two you should receive - Pneumovax (PPSV 23) and Prevnar (PCV 13).    Talk to your pharmacist about the shingles vaccine.    Talk to your doctor about the hepatitis B vaccine.    Nutrition:     Eat at least 5 servings of fruits and vegetables each day.      Eat whole-grain bread, whole-wheat pasta and brown rice instead of white grains and rice.      Get adequate Calcium and Vitamin D.     Lifestyle    Exercise at least 150 minutes a week (30 minutes a day, 5 days a week). This will help you control your weight and prevent disease.      Limit alcohol to one drink per day.      No smoking.       Wear sunscreen to prevent skin cancer.       See your dentist twice a year for an exam and cleaning.      See your eye doctor every 1 to 2 years to screen for conditions  such as glaucoma, macular degeneration and cataracts.    Personalized Prevention Plan  You are due for the preventive services outlined below.  Your care team is available to assist you in scheduling these services.  If you have already completed any of these items, please share that information with your care team to update in your medical record.  Health Maintenance Due   Topic Date Due     Depression Action Plan Review  01/01/1960     Zoster (Chicken Pox) Vaccine (1 of 2) 01/01/1992     Discuss Advance Directive Planning  01/01/1997     Stool guaic - yearly  01/06/2015     Wellness Visit with your Primary Provider - yearly  01/27/2017

## 2019-01-23 ENCOUNTER — OFFICE VISIT (OUTPATIENT)
Dept: FAMILY MEDICINE | Facility: CLINIC | Age: 79
End: 2019-01-23
Payer: COMMERCIAL

## 2019-01-23 VITALS
OXYGEN SATURATION: 98 % | SYSTOLIC BLOOD PRESSURE: 121 MMHG | WEIGHT: 148 LBS | DIASTOLIC BLOOD PRESSURE: 78 MMHG | BODY MASS INDEX: 27.96 KG/M2 | HEART RATE: 82 BPM | RESPIRATION RATE: 16 BRPM

## 2019-01-23 DIAGNOSIS — M54.16 CHRONIC LUMBAR RADICULOPATHY: ICD-10-CM

## 2019-01-23 DIAGNOSIS — K21.9 GASTROESOPHAGEAL REFLUX DISEASE, ESOPHAGITIS PRESENCE NOT SPECIFIED: Primary | ICD-10-CM

## 2019-01-23 RX ORDER — PANTOPRAZOLE SODIUM 40 MG/1
40 TABLET, DELAYED RELEASE ORAL 2 TIMES DAILY
Qty: 60 TABLET | Refills: 0 | Status: SHIPPED | OUTPATIENT
Start: 2019-01-23 | End: 2019-03-06

## 2019-01-23 NOTE — PROGRESS NOTES
Preceptor Attestation:   Patient seen, evaluated and discussed with the resident. I have verified the content of the note, which accurately reflects my assessment of the patient and the plan of care.   Supervising Physician:  Krissy Vo MD

## 2019-01-23 NOTE — PROGRESS NOTES
HPI       Lou Winston is a 79 year old  who presents for   Chief Complaint   Patient presents with     RECHECK     Patient presents for follow up of GERD. Our last visit was on 1/10/2019 at which time the pantoprazole dose was decreased from 40mg BID to 40mg daily to evaluate for worsening of reflux. Patients symptoms had previously resolved with Pantoprazole BID. Since reducing dose to once daily, she reports symptoms have returned. Has burning epigastric abdominal pain that travels up her mid-chest. Has a low appetite because of this and will feel nauseous.     Additional concern: requesting refill of voltaren gel    A Medius  was used for  this visit.    +++++++    Problem, Medication and Allergy Lists were reviewed and updated if needed..    Patient is an established patient of this clinic..         Review of Systems:   Review of Systems   Constitutional: Positive for appetite change. Negative for chills, fatigue and fever.   Respiratory: Negative for shortness of breath.    Cardiovascular: Negative for chest pain.   Gastrointestinal: Positive for abdominal pain and nausea. Negative for constipation, diarrhea and vomiting.   Genitourinary: Negative for dysuria.   Psychiatric/Behavioral: Positive for sleep disturbance (due to abdominal pain).            Physical Exam:     Vitals:    01/23/19 1552   BP: 121/78   Pulse: 82   Resp: 16   SpO2: 98%   Weight: 67.1 kg (148 lb)     Body mass index is 27.96 kg/m .  Vitals were reviewed and were normal     Physical Exam   Constitutional: No distress.   HENT:   Head: Normocephalic and atraumatic.   Cardiovascular: Normal rate and regular rhythm.   Pulmonary/Chest: Effort normal and breath sounds normal.   Abdominal: Soft. There is tenderness (epigastric region).   Neurological: She is alert.   Skin: She is not diaphoretic.   Psychiatric: She has a normal mood and affect. Her behavior is normal.       Results:   No testing ordered today    Assessment  and Plan        1. Gastroesophageal reflux disease, esophagitis presence not specified  As patient has experienced exacerbation of GERD when reducing PPI dose from BID to daily, requiring high dose PPI for relief, will refer to GI for upper endoscopy. Would like to evaluate for esophagitis, ulcers, etc. Follow up after upper endoscopy is complete to discuss results and plan.   - GASTROENTEROLOGY ADULT REF PROCEDURE ONLY  - pantoprazole (PROTONIX) 40 MG EC tablet; Take 1 tablet (40 mg) by mouth 2 times daily TAKE ONE TABLET BY MOUTH EVERY DAY 30-60 MINUTES BEFORE A MEAL  Dispense: 60 tablet; Refill: 0    2. Chronic lumbar radiculopathy  Diclofenac gel refilled.   - diclofenac (VOLTAREN) 1 % topical gel; Apply 4 grams to lower back four times daily using enclosed dosing card.  Dispense: 100 g; Refill: 11       Medications Discontinued During This Encounter   Medication Reason     pantoprazole (PROTONIX) 40 MG EC tablet Reorder     diclofenac (VOLTAREN) 1 % GEL topical gel Reorder       Options for treatment and follow-up care were reviewed with the patient. Lou Wisnton  engaged in the decision making process and verbalized understanding of the options discussed and agreed with the final plan.    Lila Esteban MD  Family Medicine PGY3 Resident

## 2019-01-23 NOTE — NURSING NOTE
Due to patient being non-English speaking/uses sign language, an  was used for this visit. Only for face-to-face interpretation by an external agency, date and length of interpretation can be found on the scanned worksheet.     name: suhas  Agency: [employer]  Language: [employer]   Telephone number: [phone]  Type of interpretation: Face-to-face, spoken   ROCIO Daniels

## 2019-01-24 ENCOUNTER — TELEPHONE (OUTPATIENT)
Dept: GASTROENTEROLOGY | Facility: CLINIC | Age: 79
End: 2019-01-24

## 2019-01-24 ASSESSMENT — ENCOUNTER SYMPTOMS
APPETITE CHANGE: 1
NAUSEA: 1
ABDOMINAL PAIN: 1
DYSURIA: 0
DIARRHEA: 0
SLEEP DISTURBANCE: 1
FATIGUE: 0
FEVER: 0
CHILLS: 0
SHORTNESS OF BREATH: 0
CONSTIPATION: 0
VOMITING: 0

## 2019-01-29 ENCOUNTER — TELEPHONE (OUTPATIENT)
Dept: GASTROENTEROLOGY | Facility: OUTPATIENT CENTER | Age: 79
End: 2019-01-29

## 2019-02-01 ENCOUNTER — TELEPHONE (OUTPATIENT)
Dept: GASTROENTEROLOGY | Facility: OUTPATIENT CENTER | Age: 79
End: 2019-02-01

## 2019-02-01 NOTE — TELEPHONE ENCOUNTER
Patient taking any blood thinners ? No     Heart disease ? No     Lung disease ? No       Sleep apnea ? No     Diabetic ? No     Kidney disease ? No     Dialysis ? N/a     Electronic implanted medical devices ? No     Are you taking any narcotic pain medication ?  No  What is your daily dosage ?    PTSD ? Yes    Prep instructions reviewed with patient ? Instructions, MAC sedation plan reviewed with patient's daughter through [employer] . Advised her to stay with patient post exam    Pharmacy : N/a    Indication for procedure :Gastroesophageal reflux disease, esophagitis presence not specified [K21.9]    Referring provider : Krissy Vo MD     Arrival Time : 12 PM

## 2019-02-05 ENCOUNTER — DOCUMENTATION ONLY (OUTPATIENT)
Dept: GASTROENTEROLOGY | Facility: OUTPATIENT CENTER | Age: 79
End: 2019-02-05
Payer: COMMERCIAL

## 2019-02-21 ENCOUNTER — TRANSFERRED RECORDS (OUTPATIENT)
Dept: HEALTH INFORMATION MANAGEMENT | Facility: CLINIC | Age: 79
End: 2019-02-21

## 2019-02-21 ENCOUNTER — DOCUMENTATION ONLY (OUTPATIENT)
Dept: GASTROENTEROLOGY | Facility: OUTPATIENT CENTER | Age: 79
End: 2019-02-21
Payer: COMMERCIAL

## 2019-02-27 LAB — COPATH REPORT: NORMAL

## 2019-03-06 ENCOUNTER — OFFICE VISIT (OUTPATIENT)
Dept: FAMILY MEDICINE | Facility: CLINIC | Age: 79
End: 2019-03-06
Payer: COMMERCIAL

## 2019-03-06 VITALS
TEMPERATURE: 98 F | BODY MASS INDEX: 27.47 KG/M2 | DIASTOLIC BLOOD PRESSURE: 78 MMHG | WEIGHT: 145.4 LBS | HEART RATE: 78 BPM | OXYGEN SATURATION: 100 % | SYSTOLIC BLOOD PRESSURE: 120 MMHG

## 2019-03-06 DIAGNOSIS — K21.9 GASTROESOPHAGEAL REFLUX DISEASE WITHOUT ESOPHAGITIS: Primary | ICD-10-CM

## 2019-03-06 DIAGNOSIS — R10.13 DYSPEPSIA: ICD-10-CM

## 2019-03-06 DIAGNOSIS — M54.16 CHRONIC LUMBAR RADICULOPATHY: ICD-10-CM

## 2019-03-06 RX ORDER — PANTOPRAZOLE SODIUM 40 MG/1
40 TABLET, DELAYED RELEASE ORAL 2 TIMES DAILY
Qty: 60 TABLET | Refills: 1 | Status: SHIPPED | OUTPATIENT
Start: 2019-03-06 | End: 2019-09-11

## 2019-03-06 RX ORDER — MULTIPLE VITAMINS W/ MINERALS TAB 9MG-400MCG
1 TAB ORAL DAILY
Qty: 90 TABLET | Refills: 3 | Status: SHIPPED | OUTPATIENT
Start: 2019-03-06 | End: 2019-09-11

## 2019-03-06 RX ORDER — PANTOPRAZOLE SODIUM 40 MG/1
40 TABLET, DELAYED RELEASE ORAL 2 TIMES DAILY
Qty: 60 TABLET | Refills: 1 | Status: SHIPPED | OUTPATIENT
Start: 2019-03-06 | End: 2019-03-06

## 2019-03-06 ASSESSMENT — ENCOUNTER SYMPTOMS
NAUSEA: 1
ABDOMINAL DISTENTION: 0
VOMITING: 0
DIARRHEA: 0
CONSTIPATION: 0
ABDOMINAL PAIN: 1
FEVER: 0
APPETITE CHANGE: 1
SHORTNESS OF BREATH: 0
DYSPHORIC MOOD: 1
FATIGUE: 0
DYSURIA: 0
CHEST TIGHTNESS: 0
CHILLS: 0

## 2019-03-06 ASSESSMENT — ANXIETY QUESTIONNAIRES
3. WORRYING TOO MUCH ABOUT DIFFERENT THINGS: MORE THAN HALF THE DAYS
6. BECOMING EASILY ANNOYED OR IRRITABLE: MORE THAN HALF THE DAYS
5. BEING SO RESTLESS THAT IT IS HARD TO SIT STILL: SEVERAL DAYS
1. FEELING NERVOUS, ANXIOUS, OR ON EDGE: MORE THAN HALF THE DAYS
2. NOT BEING ABLE TO STOP OR CONTROL WORRYING: SEVERAL DAYS
7. FEELING AFRAID AS IF SOMETHING AWFUL MIGHT HAPPEN: SEVERAL DAYS
GAD7 TOTAL SCORE: 10
IF YOU CHECKED OFF ANY PROBLEMS ON THIS QUESTIONNAIRE, HOW DIFFICULT HAVE THESE PROBLEMS MADE IT FOR YOU TO DO YOUR WORK, TAKE CARE OF THINGS AT HOME, OR GET ALONG WITH OTHER PEOPLE: VERY DIFFICULT

## 2019-03-06 ASSESSMENT — PATIENT HEALTH QUESTIONNAIRE - PHQ9: 5. POOR APPETITE OR OVEREATING: SEVERAL DAYS

## 2019-03-06 NOTE — NURSING NOTE
Due to patient being non-English speaking/uses sign language, an  was used for this visit. Only for face-to-face interpretation by an external agency, date and length of interpretation can be found on the scanned worksheet.     name: Ira Wray  Agency: [employer]  Language: [facility]   Telephone number: [phone]  Type of interpretation: Face-to-face, spoken

## 2019-03-06 NOTE — PROGRESS NOTES
HPI       Lou Winston is a 79 year old  who presents for   Chief Complaint   Patient presents with     Results     endoscopy     Upper endoscopy completed 2/22/2019 showing normal esophagus, patchy erythema in gastric antrum, possibly corresponding to recently healed erosions, otherwise normal stomach. A few focal non specific aphthae with scant erythema in duodenal bulb suggestive of peptic duodenitis or recent NSAID effect, otherwise normal duodenum.   Recommendation was PPI/H2 blocker at tolerated/effective dose. Also start MVI. Lou had stopped taking her medication after the endoscopy but was since restarted. Has been taking pantoprazole once per day but is not effective at providing relief at this dose, gets relief at twice daily dosing.     Also discussed Lou's mood per PHQ9. She tells me she feels sad and has a low appetite when her abdominal pain is not well controlled. It becomes a frustrating experience for her which leads to dysphoric mood. Does better when pain is better. She currently lives with her daughter and is close with all her neighbors. Has no concerns regarding safety at home.     A [employer]  was used for  this visit.    +++++++      Problem, Medication and Allergy Lists were reviewed and updated if needed..    Patient is an established patient of this clinic..         Review of Systems:   Review of Systems   Constitutional: Positive for appetite change. Negative for chills, fatigue and fever.   HENT: Negative.    Respiratory: Negative for chest tightness and shortness of breath.    Cardiovascular: Negative for chest pain.   Gastrointestinal: Positive for abdominal pain and nausea. Negative for abdominal distention, constipation, diarrhea and vomiting.   Genitourinary: Negative for dysuria.   Psychiatric/Behavioral: Positive for dysphoric mood (due to abdominal pain).            Physical Exam:     Vitals:    03/06/19 0852   BP: 120/78   Pulse: 78   Temp: 98  F (36.7   C)   TempSrc: Oral   SpO2: 100%   Weight: 66 kg (145 lb 6.4 oz)     Body mass index is 27.47 kg/m .  Vitals were reviewed and were normal     Physical Exam   Constitutional: No distress.   HENT:   Head: Normocephalic and atraumatic.   Pulmonary/Chest: Effort normal.   Abdominal: Soft. There is no tenderness.   Neurological: She is alert.   Skin: She is not diaphoretic.   Psychiatric: Her speech is normal and behavior is normal.   Appears down and sad.        Results:   No testing ordered today    Assessment and Plan        1. Gastroesophageal reflux disease without esophagitis  2. Dyspepsia  Reviewed upper endoscopy results with patient. Consistent with dyspepsia/GERD without esophagitis. Biopsies were taken to test for H pylori and were negative. Will continue treatment with PPI at effective/tolerated dose of pantoprazole 40mg BID. Also ordered MVI.   Recommend small frequent meals throughout the day, avoiding prolonged fasting, regular mild/mod cardio exercise, avoid eating/drinking within 2-3h of bedtime, avoid systemic NSAIDs.   Follow up in 1 month.   - multivitamin w/minerals (THERA-VIT-M) tablet; Take 1 tablet by mouth daily  Dispense: 90 tablet; Refill: 3  - pantoprazole (PROTONIX) 40 MG EC tablet; Take 1 tablet (40 mg) by mouth 2 times daily TAKE ONE TABLET BY MOUTH TWO TIMES DAILY 30-60 MINUTES BEFORE A MEAL  Dispense: 60 tablet; Refill: 1    3. Chronic lumbar radiculopathy  Refilled voltaren gel.   - diclofenac (VOLTAREN) 1 % topical gel; Apply 4 grams to lower back four times daily using enclosed dosing card.  Dispense: 100 g; Refill: 11       Medications Discontinued During This Encounter   Medication Reason     diclofenac (VOLTAREN) 1 % topical gel Reorder     pantoprazole (PROTONIX) 40 MG EC tablet Reorder     pantoprazole (PROTONIX) 40 MG EC tablet        Options for treatment and follow-up care were reviewed with the patient. Lou Winston  engaged in the decision making process and verbalized  understanding of the options discussed and agreed with the final plan.    Lila Esteban MD  Family Medicine PGY3 Resident

## 2019-03-07 ASSESSMENT — ANXIETY QUESTIONNAIRES: GAD7 TOTAL SCORE: 10

## 2019-03-09 NOTE — PROGRESS NOTES
Preceptor Attestation:   Patient seen, evaluated and discussed with the resident. I have verified the content of the note, which accurately reflects my assessment of the patient and the plan of care.   Supervising Physician:  Chirag Diez MD

## 2019-04-02 ENCOUNTER — DOCUMENTATION ONLY (OUTPATIENT)
Dept: FAMILY MEDICINE | Facility: CLINIC | Age: 79
End: 2019-04-02

## 2019-04-04 NOTE — PROGRESS NOTES
Visit to the client's home for annual health risk assessment and PCA assessment.  An  was present.    Current situation/living environment  Client lives with her granddaughter in a 1 bedroom apartment. The home is neat and tidy and appears well cared for.    Activities of daily living (ADL)/instrumental activities of daily living (IADL) and functional issues  Client needs help with the following ADL's: dressing, grooming and bathing  Client needs help with the following IADL's: shopping, cooking, housekeeping, laundry, managing finances/bills and transportation  Client states she is unable to perform the above due to chronic back and shoulder pain and dizziness. Client has PCA and homemaking in place for assistance with ADLs and IADLs.      Health concerns for today  Client reports continued pain as well and nausea/vomiting and dizziness. She continues to work with her PCP on these issues.  Has patient fallen 2 or more times in the last year? No  Has patient fallen with injury in the last year? No    Cognition/mental health  Client is alert and oriented. She reports forgetfulness and times of not remembering things such as forgetting to wear shoes when going out and forgetting to turn the stove off. Client reports she does not do any cooking (homemaker) due to this. Her granddaughter lives with her and will report any concerns to client's daughter. Client reports she continues to struggle with depression. She attends [facility] 3 days per week and sees a psychologist monthly.     STARS/Med Adherence  Client is non-compliant with the following quality measures: n/a-up to date based on age  Comments: n/a    Client's Plan of Care consists of:  Adult day care (3 days per week), Homemaker services (5 hours per week), Personal care assistance (PCA) (1.75 hours per day) and Transportation    Ashlee Lopes RN  [facility] Care Coordinator  [phone]

## 2019-04-05 ENCOUNTER — OFFICE VISIT (OUTPATIENT)
Dept: FAMILY MEDICINE | Facility: CLINIC | Age: 79
End: 2019-04-05
Payer: COMMERCIAL

## 2019-04-05 VITALS
TEMPERATURE: 98.6 F | SYSTOLIC BLOOD PRESSURE: 121 MMHG | OXYGEN SATURATION: 97 % | HEART RATE: 82 BPM | DIASTOLIC BLOOD PRESSURE: 75 MMHG | WEIGHT: 145.2 LBS | BODY MASS INDEX: 27.44 KG/M2

## 2019-04-05 DIAGNOSIS — M25.561 ACUTE PAIN OF RIGHT KNEE: ICD-10-CM

## 2019-04-05 DIAGNOSIS — R25.2 MUSCLE CRAMP, NOCTURNAL: Primary | ICD-10-CM

## 2019-04-05 DIAGNOSIS — F33.1 MAJOR DEPRESSIVE DISORDER, RECURRENT EPISODE, MODERATE (H): ICD-10-CM

## 2019-04-05 DIAGNOSIS — M54.16 CHRONIC LUMBAR RADICULOPATHY: ICD-10-CM

## 2019-04-05 DIAGNOSIS — K21.00 GASTROESOPHAGEAL REFLUX DISEASE WITH ESOPHAGITIS: ICD-10-CM

## 2019-04-05 LAB
BUN SERPL-MCNC: 12.8 MG/DL (ref 7–19)
CALCIUM SERPL-MCNC: 9.4 MG/DL (ref 8.5–10.1)
CHLORIDE SERPLBLD-SCNC: 102.8 MMOL/L (ref 98–110)
CO2 SERPL-SCNC: 23.3 MMOL/L (ref 20–32)
CREAT SERPL-MCNC: 0.6 MG/DL (ref 0.5–1)
FERRITIN SERPL-MCNC: 61 NG/ML (ref 8–252)
GFR SERPL CREATININE-BSD FRML MDRD: >90 ML/MIN/1.7 M2
GLUCOSE SERPL-MCNC: 116.8 MG'DL (ref 70–99)
POTASSIUM SERPL-SCNC: 3.8 MMOL/DL (ref 3.3–4.5)
SODIUM SERPL-SCNC: 135.4 MMOL/L (ref 132.6–141.4)

## 2019-04-05 RX ORDER — DULOXETIN HYDROCHLORIDE 60 MG/1
60 CAPSULE, DELAYED RELEASE ORAL 2 TIMES DAILY
Qty: 60 CAPSULE | Refills: 11 | Status: SHIPPED | OUTPATIENT
Start: 2019-04-05 | End: 2019-09-11

## 2019-04-05 RX ORDER — SUCRALFATE 1 G/1
1 TABLET ORAL 4 TIMES DAILY
Qty: 120 TABLET | Refills: 1 | Status: SHIPPED | OUTPATIENT
Start: 2019-04-05 | End: 2019-09-11

## 2019-04-05 ASSESSMENT — ENCOUNTER SYMPTOMS
FEVER: 0
NAUSEA: 0
CHILLS: 0
ARTHRALGIAS: 1
VOMITING: 0
MYALGIAS: 1
NUMBNESS: 0
WEAKNESS: 0
COLOR CHANGE: 0
ABDOMINAL PAIN: 1

## 2019-04-05 ASSESSMENT — PATIENT HEALTH QUESTIONNAIRE - PHQ9: SUM OF ALL RESPONSES TO PHQ QUESTIONS 1-9: 14

## 2019-04-05 NOTE — PROGRESS NOTES
Preceptor Attestation:   Patient seen and discussed with the resident. Assessment and plan reviewed with resident and agreed upon.   Supervising Physician:  [facility]

## 2019-04-05 NOTE — NURSING NOTE
Due to patient being non-English speaking/uses sign language, an  was used for this visit. Only for face-to-face interpretation by an external agency, date and length of interpretation can be found on the scanned worksheet.     name: georgette pratt  Agency: [employer]  Language: [facility]   Telephone number: [phone]  Type of interpretation: Face-to-face, spoken

## 2019-04-05 NOTE — LETTER
April 11, 2019      Lou Winston  [address]  [place] [address]        Dear Lou,    Thank you for getting your care at [facility]. Please see below for your test results. Your salts in your body are normal and iron storage level is normal.     Resulted Orders   Basic Metabolic Panel ([facility])   Result Value Ref Range    Urea Nitrogen 12.8 7.0 - 19.0 mg/dL    Calcium 9.4 8.5 - 10.1 mg/dL    Chloride 102.8 98.0 - 110.0 mmol/L    Carbon Dioxide 23.3 20.0 - 32.0 mmol/L    Creatinine 0.6 0.5 - 1.0 mg/dL    Glucose 116.8 (H) 70.0 - 99.0 mg'dL    Potassium 3.8 3.3 - 4.5 mmol/dL    Sodium 135.4 132.6 - 141.4 mmol/L    GFR Estimate >90 >60.0 mL/min/1.7 m2    GFR Estimate If Black >90 >60.0 mL/min/1.7 m2   Ferritin   Result Value Ref Range    Ferritin 61 8 - 252 ng/mL       Sincerely,    Lila Esteban MD  Family Medicine PGY3 Resident

## 2019-04-05 NOTE — PROGRESS NOTES
HPI       Lou Winston is a 79 year old  who presents for   Chief Complaint   Patient presents with     Musculoskeletal Problem     Right leg pain x1 month     Lou presents with rIght knee and leg pain. Onset 1 month ago. No known injuries, triggers, or falls. Describes location of knee pain over lateral and posterior knee. Says she also experiences a throbbing, tightening pain in her calf. Feels as though muscle tightens and she can feel a mass in the area of the calf. Worse at night. Knee pain improves with walking. Denies locking of knee. Has a history of low back and right hip pain. Has never had knee or leg pain before. Denies numbness/tingling or weakness. Has applied heat and ice, is massaging the area, and applying diclofenac cream which she finds helpful but is only temporary. Has never had an x-ray of her knees in the past. No discoloration of the skin of her legs. No swelling in legs.     Additional concern: says she wants to go up on her pantoprazole. Is taking 40mg BID and is wondering if dose can be increased. Continues to experience dyspepsia and GERD which limits her PO intake.    A PandaBed  was used for  this visit.    +++++++    Problem, Medication and Allergy Lists were reviewed and updated if needed..    Patient is an established patient of this clinic..         Review of Systems:   Review of Systems   Constitutional: Negative for chills and fever.   Gastrointestinal: Positive for abdominal pain. Negative for nausea and vomiting.   Musculoskeletal: Positive for arthralgias and myalgias.   Skin: Negative for color change and pallor.   Neurological: Negative for weakness and numbness.            Physical Exam:     Vitals:    04/05/19 1535   BP: 121/75   Pulse: 82   Temp: 98.6  F (37  C)   TempSrc: Oral   SpO2: 97%   Weight: 65.9 kg (145 lb 3.2 oz)     Body mass index is 27.44 kg/m .  Vitals were reviewed and were normal     Physical Exam   Constitutional: No distress.   HENT:    Head: Normocephalic and atraumatic.   Pulmonary/Chest: Effort normal.   Musculoskeletal: She exhibits no edema.        Right hip: She exhibits normal range of motion.        Left hip: Normal.        Right knee: She exhibits normal range of motion, no swelling, no effusion and no erythema. Tenderness found. Lateral joint line tenderness noted. No medial joint line and no patellar tendon tenderness noted.        Left knee: Normal.   Pain in right hip with external and internal rotation of hip. No calf tenderness of RLE.    Neurological: She is alert. No sensory deficit.   Skin: Skin is warm and dry. She is not diaphoretic. No erythema. No pallor.   Psychiatric: She has a normal mood and affect. Her behavior is normal.       Results:   Results are ordered and pending    Assessment and Plan        1. Muscle cramp, nocturnal  2. Acute pain of right knee  1 month of right knee and leg pain without trigger. Knee pain may be related to arthritic pain. Has never had an x-ray of knees done because she has not experienced knee pain in the past. Unable to obtain x-ray today in clinic as it is not available in clinic today. Patient also reports symptoms c/w muscle cramping of right lower extremity, which is worse at night. Considering electrolyte abnormality vs iron def vs RLS as etiologies. Will check BMP and ferritin level. Ok to use tylenol, diclofenac cream and cymbalta for pain control. Avoid NSAIDs in setting of significant chronic gastritis. Follow up in 1 week to check x-ray of right knee.   - Basic Metabolic Panel ([facility])  - Ferritin    3. Chronic lumbar radiculopathy  Refilled duloxetine and diclofenac gel.   - DULoxetine (CYMBALTA) 60 MG capsule; Take 1 capsule (60 mg) by mouth 2 times daily  Dispense: 60 capsule; Refill: 11  - diclofenac (VOLTAREN) 1 % topical gel; Apply 4 grams to lower back four times daily using enclosed dosing card.  Dispense: 100 g; Refill: 11    4. Major depressive disorder, recurrent  episode, moderate (H)  - DULoxetine (CYMBALTA) 60 MG capsule; Take 1 capsule (60 mg) by mouth 2 times daily  Dispense: 60 capsule; Refill: 11    5. Gastroesophageal reflux disease with esophagitis  Discussed that PPI is max dose. Will add carafate to assist with symptom management.   - sucralfate (CARAFATE) 1 GM tablet; Take 1 tablet (1 g) by mouth 4 times daily  Dispense: 120 tablet; Refill: 1       There are no discontinued medications.    Options for treatment and follow-up care were reviewed with the patient. Lou Winston  engaged in the decision making process and verbalized understanding of the options discussed and agreed with the final plan.    Lila Esteban MD  Family Medicine PGY3 Resident

## 2019-04-30 ENCOUNTER — ANCILLARY PROCEDURE (OUTPATIENT)
Dept: GENERAL RADIOLOGY | Facility: CLINIC | Age: 79
End: 2019-04-30
Attending: FAMILY MEDICINE
Payer: COMMERCIAL

## 2019-04-30 ENCOUNTER — TELEPHONE (OUTPATIENT)
Dept: FAMILY MEDICINE | Facility: CLINIC | Age: 79
End: 2019-04-30

## 2019-04-30 ENCOUNTER — OFFICE VISIT (OUTPATIENT)
Dept: FAMILY MEDICINE | Facility: CLINIC | Age: 79
End: 2019-04-30
Payer: COMMERCIAL

## 2019-04-30 VITALS
HEART RATE: 77 BPM | DIASTOLIC BLOOD PRESSURE: 72 MMHG | BODY MASS INDEX: 26.31 KG/M2 | HEIGHT: 62 IN | TEMPERATURE: 98.3 F | SYSTOLIC BLOOD PRESSURE: 115 MMHG | WEIGHT: 143 LBS | OXYGEN SATURATION: 98 %

## 2019-04-30 DIAGNOSIS — M25.561 ACUTE PAIN OF RIGHT KNEE: Primary | ICD-10-CM

## 2019-04-30 DIAGNOSIS — L29.9 ITCHING: ICD-10-CM

## 2019-04-30 DIAGNOSIS — M25.561 ACUTE PAIN OF RIGHT KNEE: ICD-10-CM

## 2019-04-30 ASSESSMENT — ENCOUNTER SYMPTOMS
MYALGIAS: 1
WOUND: 0
COLOR CHANGE: 0
WEAKNESS: 0
JOINT SWELLING: 0
ARTHRALGIAS: 1
FEVER: 0
NUMBNESS: 0

## 2019-04-30 ASSESSMENT — MIFFLIN-ST. JEOR: SCORE: 1078.95

## 2019-04-30 NOTE — PROGRESS NOTES
"       HPI       Lou Winston is a 79 year old  who presents for   Chief Complaint   Patient presents with     Knee Pain     right knee pain, pain while walking a incline     Derm Problem     itching all over body, worse at night while laying in bed.     Lou presents for follow up of right knee pain. Was seen in clinic on 4/5/2019 for similar concern. Visit note reviewed. At that visit, ferritin and BMP were checked and were wnl. Today she tells me the pain is located in the posterior and lateral knee area. She also has anterior knee pain if she were to be on her knees such as during prayer. She says the pain is worse when going upstairs. She feels like something is pulling or moving in the back of her knee. No leg swelling or redness. No injuries or triggers.     Additional concern: an incident of itching that occurred one night after she applied a liquid cultural remedy to the skin surrounding her right knee to help with pain. After she applied the liquid, she began to experience intense itching of her right leg.  She then began to feel warm, started to sweat, and the itching began to involve her entire body.  She is cold water but did not help.  This incident occurred over 1 month ago.  She has not used that same liquid since then. No further itching.    A Kitchenbug  was used for  this visit.    +++++++  Problem, Medication and Allergy Lists were reviewed and updated if needed..    Patient is an established patient of this clinic..         Review of Systems:   Review of Systems   Constitutional: Negative for fever.   Musculoskeletal: Positive for arthralgias and myalgias. Negative for joint swelling.   Skin: Negative for color change, rash and wound.   Neurological: Negative for weakness and numbness.            Physical Exam:     Vitals:    04/30/19 0953   BP: 115/72   Pulse: 77   Temp: 98.3  F (36.8  C)   TempSrc: Oral   SpO2: 98%   Weight: 64.9 kg (143 lb)   Height: 1.562 m (5' 1.5\")     Body " mass index is 26.58 kg/m .  Vitals were reviewed and were normal     Physical Exam   Constitutional: No distress.   HENT:   Head: Normocephalic and atraumatic.   Pulmonary/Chest: Effort normal.   Musculoskeletal:        Right knee: She exhibits normal range of motion, no swelling and no effusion. No tenderness found. No medial joint line and no lateral joint line tenderness noted.   No palpable mass/lump posterior knee   Neurological: She is alert.   Skin: Skin is warm. No rash noted. She is not diaphoretic. No erythema. No pallor.   Psychiatric: She has a normal mood and affect. Her behavior is normal.       Results:   Results from last visit:  Office Visit on 04/05/2019   Component Date Value Ref Range Status     Urea Nitrogen 04/05/2019 12.8  7.0 - 19.0 mg/dL Final     Calcium 04/05/2019 9.4  8.5 - 10.1 mg/dL Final     Chloride 04/05/2019 102.8  98.0 - 110.0 mmol/L Final     Carbon Dioxide 04/05/2019 23.3  20.0 - 32.0 mmol/L Final     Creatinine 04/05/2019 0.6  0.5 - 1.0 mg/dL Final     Glucose 04/05/2019 116.8* 70.0 - 99.0 mg'dL Final     Potassium 04/05/2019 3.8  3.3 - 4.5 mmol/dL Final     Sodium 04/05/2019 135.4  132.6 - 141.4 mmol/L Final     GFR Estimate 04/05/2019 >90  >60.0 mL/min/1.7 m2 Final     GFR Estimate If Black 04/05/2019 >90  >60.0 mL/min/1.7 m2 Final     Ferritin 04/05/2019 61  8 - 252 ng/mL Final       Assessment and Plan        1. Acute pain of right knee  Unclear etiology at this point.  Ferritin and BMP wnl.  A knee x-ray was obtained today and showed joint space narrowing in the medial femorotibial joint compartment of both knees, however patient is not complaining of pain in this area.  There is no palpable posterior knee mass.  Differentials at this point include popliteal cysts versus bursitis.  Low suspicion for DVT as there is no calf tenderness no redness and no unilateral leg swelling.  Additionally, patient does not have risk factors for DVT.  Next step in management will include  obtaining an ultrasound to evaluate for a popliteal cyst of the right posterior knee.  Will have patient follow-up after ultrasound.  - X-ray rt knee 3 view; Future  - US Extremity Non Vascular Right; Future    2. Itching  Likely a reaction related to topical cultural medication patient used.  Has not recurred since stopping the medication.  Advised to avoid in the future.       There are no discontinued medications.    Options for treatment and follow-up care were reviewed with the patient. Lou Winston  engaged in the decision making process and verbalized understanding of the options discussed and agreed with the final plan.    Lila Esteban MD  Family Medicine PGY3 Resident

## 2019-04-30 NOTE — NURSING NOTE
Due to patient being non-English speaking/uses sign language, an  was used for this visit. Only for face-to-face interpretation by an external agency, date and length of interpretation can be found on the scanned worksheet.     name: Ira Wray  Agency: [employer]  Language: [facility]   Telephone number: [phone]  Type of interpretation: Face-to-face, spoken

## 2019-04-30 NOTE — TELEPHONE ENCOUNTER
"  Per PCP \"Could you please call patient to let her know that an ultrasound has been ordered to look for a cyst behind her knee. She may schedule the ultrasound at her earliest convenience. Follow up with me after the ultrasound is done.\" Lila Esteban MD, Family Medicine PGY3 Resident.    Called and spoke with patient and her daughter via  ([email or web address]) to relay the message, patient verbalized understanding and agreed plan of care. Patient will schedule ultrasound and follow up appointment with PCP afterwards. Imaging center phone number given to patient.    Macey Landrum RN        "

## 2019-08-26 ENCOUNTER — TELEPHONE (OUTPATIENT)
Dept: FAMILY MEDICINE | Facility: CLINIC | Age: 79
End: 2019-08-26

## 2019-08-26 NOTE — TELEPHONE ENCOUNTER
"[facility] phone call message- patient requesting form status:    Type of Form: Marilyn from [facility] calling in to check the status of the form. The form is \"Health care summary and TB assessment form of the patient\" she is requesting to call back if there are any questions in regards to the forms and forms can be faxed to [phone].    Given to: Faxed FD    Submitted: within 10 business days     Date Submitted: August 19th 2019    Date Needed by:  As early as possible.    Additional Details: None    Advised Patient it may take up to 7 - 10 business days: Yes    OK to leave a message on voice mail? Yes    Primary language: Oromo      needed? Yes    Call taken on August 26, 2019 at 11:59 AM by Alexandra Aragon    Route to PATRICIA ROLLINS (color team) PCS  "

## 2019-08-27 NOTE — TELEPHONE ENCOUNTER
"When opening a documentation only encounter, be sure to enter in \"Chief Complaint\" Forms and in \" Comments\" Title of form, description if needed.    Lou is a 79 year old  female  Form received via: Fax  Form now resides in:  to schedule appointments    Patient needs a Follow up visit in order for form to be filled out.    Tiana Cordoba, JUS                  "

## 2019-09-11 ENCOUNTER — TRANSFERRED RECORDS (OUTPATIENT)
Dept: HEALTH INFORMATION MANAGEMENT | Facility: CLINIC | Age: 79
End: 2019-09-11

## 2019-09-11 ENCOUNTER — OFFICE VISIT (OUTPATIENT)
Dept: FAMILY MEDICINE | Facility: CLINIC | Age: 79
End: 2019-09-11
Payer: COMMERCIAL

## 2019-09-11 VITALS
OXYGEN SATURATION: 98 % | TEMPERATURE: 98.4 F | HEIGHT: 62 IN | HEART RATE: 81 BPM | BODY MASS INDEX: 25.8 KG/M2 | SYSTOLIC BLOOD PRESSURE: 110 MMHG | WEIGHT: 140.2 LBS | DIASTOLIC BLOOD PRESSURE: 69 MMHG

## 2019-09-11 DIAGNOSIS — Z13.9 SCREENING FOR CONDITION: Primary | ICD-10-CM

## 2019-09-11 DIAGNOSIS — K21.9 GASTROESOPHAGEAL REFLUX DISEASE WITHOUT ESOPHAGITIS: ICD-10-CM

## 2019-09-11 DIAGNOSIS — F33.1 MAJOR DEPRESSIVE DISORDER, RECURRENT EPISODE, MODERATE (H): ICD-10-CM

## 2019-09-11 DIAGNOSIS — M54.16 CHRONIC LUMBAR RADICULOPATHY: ICD-10-CM

## 2019-09-11 RX ORDER — PANTOPRAZOLE SODIUM 40 MG/1
40 TABLET, DELAYED RELEASE ORAL 2 TIMES DAILY
Qty: 60 TABLET | Refills: 1 | Status: SHIPPED | OUTPATIENT
Start: 2019-09-11 | End: 2019-12-17

## 2019-09-11 RX ORDER — ACETAMINOPHEN 500 MG
1000 TABLET ORAL EVERY 8 HOURS PRN
Qty: 100 TABLET | Refills: 11 | Status: SHIPPED | OUTPATIENT
Start: 2019-09-11 | End: 2020-01-27

## 2019-09-11 RX ORDER — DULOXETIN HYDROCHLORIDE 60 MG/1
60 CAPSULE, DELAYED RELEASE ORAL 2 TIMES DAILY
Qty: 60 CAPSULE | Refills: 11 | Status: SHIPPED | OUTPATIENT
Start: 2019-09-11 | End: 2020-01-27

## 2019-09-11 RX ORDER — MULTIPLE VITAMINS W/ MINERALS TAB 9MG-400MCG
1 TAB ORAL DAILY
Qty: 90 TABLET | Refills: 3 | Status: SHIPPED | OUTPATIENT
Start: 2019-09-11 | End: 2020-01-27

## 2019-09-11 ASSESSMENT — ENCOUNTER SYMPTOMS
DIZZINESS: 1
SHORTNESS OF BREATH: 0
UNEXPECTED WEIGHT CHANGE: 0
BACK PAIN: 1
COUGH: 0
CHILLS: 0
FEVER: 0

## 2019-09-11 ASSESSMENT — MIFFLIN-ST. JEOR: SCORE: 1070.22

## 2019-09-11 NOTE — LETTER
September 15, 2019      Lou Winston  [address]  [place] [address]        Dear Lou,    Thank you for getting your care at [place]. Please see below for your test results. Your quantiferon gold test is positive as expected if you have been treated for latent TB.    Resulted Orders   Quantiferon TB Gold Plus   Result Value Ref Range    Quantiferon-TB Gold Plus Result Positive (A) NEG^Negative      Comment:      Interferon gamma response to M.tuberculosis antigens was detected,suggesting   infection with M.tuberculosis. Positive results in patients at low risk for   infection should be interpreted with caution and repeat testing on a new   sample should be considered    as recommended by the 2017 ATS/IDSA/CDC Clinical Practice Guidelines for   Diagnosis of Tuberculosis in Adults and Children [Lewinsohn DM et al.   Clin.Infect.Dis. 2017 64(92):111-115]. False positive results may occur in   patients with prior infection with M.marinum, M.szulgai or M.kansasii.      TB1 Ag minus Nil Value 5.61 IU/mL    TB2 Ag minus Nil Value 6.39 IU/mL    Mitogen minus Nil Result >10.00 IU/mL    Nil Result 0.44 IU/mL       I will let Dr. Ridley know and you both can discuss getting a chest xray at your next visit with her. If you have any questions, please call the clinic.  Sincerely,    Caro Nogueira MD

## 2019-09-11 NOTE — NURSING NOTE
Due to patient being non-English speaking/uses sign language, an  was used for this visit. Only for face-to-face interpretation by an external agency, date and length of interpretation can be found on the scanned worksheet.     name: Ira Wray  Agency: [employer]  Language: [facility]   Telephone number: [phone]  Type of interpretation: Face-to-face, spoken

## 2019-09-11 NOTE — PATIENT INSTRUCTIONS
Here is the plan from today's visit    1. Chronic lumbar radiculopathy  Refills provided  - acetaminophen (TYLENOL) 500 MG tablet; Take 2 tablets (1,000 mg) by mouth every 8 hours as needed for mild pain  Dispense: 100 tablet; Refill: 11  - diclofenac (VOLTAREN) 1 % topical gel; Apply 4 grams to lower back four times daily using enclosed dosing card.  Dispense: 100 g; Refill: 11  - DULoxetine (CYMBALTA) 60 MG capsule; Take 1 capsule (60 mg) by mouth 2 times daily  Dispense: 60 capsule; Refill: 11    2. Major depressive disorder, recurrent episode, moderate (H)  Refills provided  - DULoxetine (CYMBALTA) 60 MG capsule; Take 1 capsule (60 mg) by mouth 2 times daily  Dispense: 60 capsule; Refill: 11    3. Gastroesophageal reflux disease without esophagitis  Refills provided  - multivitamin w/minerals (THERA-VIT-M) tablet; Take 1 tablet by mouth daily  Dispense: 90 tablet; Refill: 3  - pantoprazole (PROTONIX) 40 MG EC tablet; Take 1 tablet (40 mg) by mouth 2 times daily TAKE ONE TABLET BY MOUTH TWO TIMES DAILY 30-60 MINUTES BEFORE A MEAL  Dispense: 60 tablet; Refill: 1    4. Screening for condition  Follow up with Dr. Nettles for results and forms  - Quantiferon TB Gold Plus      Please call or return to clinic if your symptoms don't go away.    Follow up plan  Please make a clinic appointment for follow up with your primary physician Soco Ridley MD for results and form.    Thank you for coming to [facility] today.  Lab Testing:  **If you had lab testing today and your results are reassuring or normal they will be mailed to you or sent through [employer] within 7 days.   **If the lab tests need quick action we will call you with the results.  The phone number we will call with results is # [phone] (home) . If this is not the best number please call our clinic and change the number.  Medication Refills:  If you need any refills please call your pharmacy and they will contact us.   If you need to  your  refill at a new pharmacy, please contact the new pharmacy directly. The new pharmacy will help you get your medications transferred faster.   Scheduling:  If you have any concerns about today's visit or wish to schedule another appointment please call our office during normal business hours [phone] (8-5:00 M-F)  If a referral was made to a [facility] and you don't get a call from central scheduling please call [phone].  If a Mammogram was ordered for you at [facility] call [phone] to schedule or change your appointment.  If you had an XRay/CT/Ultrasound/MRI ordered the number is [phone] to schedule or change your radiology appointment.   Medical Concerns:  If you have urgent medical concerns please call [phone] at any time of the day.    Caro Nogueira MD

## 2019-09-11 NOTE — PROGRESS NOTES
Preceptor Attestation:   Patient seen, evaluated and discussed with the resident. I have verified the content of the note, which accurately reflects my assessment of the patient and the plan of care.   Supervising Physician:  Ronaldo Bose MD MD

## 2019-09-11 NOTE — PROGRESS NOTES
"       HPI       Lou Winston is a 79 year old  who presents for   Chief Complaint   Patient presents with     Refill Request     pended     Request for refills for medications.    Form for day care.  Patient in adult  and was just notified that a form needs to be filled out for continued attendance there.  The adult  is also requesting testing for TB.  Patient notes that she was treated for latent TB with 9 months of treatment in the past, but we have no records of this.     An [employer]  was used for  this visit.    +++++++    Problem, Medication and Allergy Lists were reviewed and updated if needed..    Patient is an established patient of this clinic..         Review of Systems:   Review of Systems   Constitutional: Negative for chills, fever and unexpected weight change.   Respiratory: Negative for cough and shortness of breath.    Cardiovascular: Negative for chest pain.   Gastrointestinal: Positive for nausea (when in a car).   Musculoskeletal: Positive for back pain (low).   Neurological: Positive for dizziness.            Physical Exam:     Vitals:    09/11/19 0926   BP: 110/69   Pulse: 81   Temp: 98.4  F (36.9  C)   TempSrc: Oral   SpO2: 98%   Weight: 63.6 kg (140 lb 3.2 oz)   Height: 1.568 m (5' 1.75\")     Body mass index is 25.85 kg/m .  Vitals were reviewed and were normal     Physical Exam   Constitutional: She is oriented to person, place, and time. She appears well-developed and well-nourished. No distress.   HENT:   Head: Normocephalic and atraumatic.   Eyes: EOM are normal.   Cardiovascular: Normal rate.   Pulmonary/Chest: Effort normal.   Neurological: She is alert and oriented to person, place, and time.   Skin: She is not diaphoretic.   Psychiatric: She has a normal mood and affect. Her behavior is normal.         Results:   Results are ordered and pending    Assessment and Plan        Lou was seen today for refill request.    Diagnoses and all orders for this " visit:    Screening for condition - Was a patient of Dr. Mora  and now Dr. Francisco.  This is the first time meeting the patient and she was also late, so was unable to complete form today.  Will screen for TB with quant gold. Patient might need chest x-ray if positive with known treatment, but no documentation.  Form placed in provider's box and patient aware she needs a follow up visit for this to be completed. Patient denies any cough, hemoptysis, fever, or night sweats.  -     Quantiferon TB Gold Plus    Chronic lumbar radiculopathy - Refill provided. Problem not addressed.  -     acetaminophen (TYLENOL) 500 MG tablet; Take 2 tablets (1,000 mg) by mouth every 8 hours as needed for mild pain  -     diclofenac (VOLTAREN) 1 % topical gel; Apply 4 grams to lower back four times daily using enclosed dosing card.  -     DULoxetine (CYMBALTA) 60 MG capsule; Take 1 capsule (60 mg) by mouth 2 times daily    Major depressive disorder, recurrent episode, moderate (H) - Refill provided. Problem not addressed.  -     DULoxetine (CYMBALTA) 60 MG capsule; Take 1 capsule (60 mg) by mouth 2 times daily    Gastroesophageal reflux disease without esophagitis - Refill provided. Problem not addressed.  -     multivitamin w/minerals (THERA-VIT-M) tablet; Take 1 tablet by mouth daily  -     pantoprazole (PROTONIX) 40 MG EC tablet; Take 1 tablet (40 mg) by mouth 2 times daily TAKE ONE TABLET BY MOUTH TWO TIMES DAILY 30-60 MINUTES BEFORE A MEAL         There are no discontinued medications.    Options for treatment and follow-up care were reviewed with the patient. Lou Winston  engaged in the decision making process and verbalized understanding of the options discussed and agreed with the final plan.    Caro Nogueira MD  PGY-3

## 2019-09-12 ASSESSMENT — ENCOUNTER SYMPTOMS: NAUSEA: 1

## 2019-09-13 LAB
GAMMA INTERFERON BACKGROUND BLD IA-ACNC: 0.44 IU/ML
M TB IFN-G BLD-IMP: POSITIVE
M TB IFN-G CD4+ BCKGRND COR BLD-ACNC: >10 IU/ML
MITOGEN IGNF BCKGRD COR BLD-ACNC: 5.61 IU/ML
MITOGEN IGNF BCKGRD COR BLD-ACNC: 6.39 IU/ML

## 2019-09-16 ENCOUNTER — TELEPHONE (OUTPATIENT)
Dept: FAMILY MEDICINE | Facility: CLINIC | Age: 79
End: 2019-09-16

## 2019-09-16 NOTE — TELEPHONE ENCOUNTER
[facility] Prior Authorization Team Request    Medication: diclofenac 1% gel  Dosing: Apply 4gms to lower back four times daily using enclosed dosing card   NDC (required for Medicaid members): [address]-10    Insurance [facility]  BIN: [id number]  PCN: [place]  Grp: [id number]  ID: [id number]    CoverMyMeds Key (if applicable):     Additional documentation:     Filling Pharmacy: [facility]  Phone Number: [phone]  Contact:  [employer] (P [id number]) please send all responses to this pool.  Pharmacy NPI (required for Medicaid members):    Hanny CHUN CPhT @    [facility]  [address] [place] [address]  Phone: [phone]  Fax: [phone]

## 2019-10-01 NOTE — PROGRESS NOTES
"       HPI       Lou Winston is a 79 year old  who presents for   Chief Complaint   Patient presents with     Recheck Medication     medication follow up     Here to follow-up labs from last, and forms.  IGRA positive - not surprising given diagnosis of latent TB in past, s/p treatment 9 months but no records of this. Got this at [facility] on [facility].    Does have chronic cough that was worked up in past, told was okay. No fever, unintentional weight loss, chest pain.  FERMIN signed to have her records from [facility]    An [employer]  was used for  this visit.    +++++++    Problem, Medication and Allergy Lists were reviewed and updated if needed..    Patient is an established patient of this clinic..         Review of Systems:   Review of Systems   Constitutional: Negative for appetite change, chills, fever and unexpected weight change.   Respiratory: Positive for cough. Negative for shortness of breath.    Cardiovascular: Negative for chest pain.   Gastrointestinal: Negative for abdominal pain.   Genitourinary: Negative for dysuria.   Musculoskeletal: Negative for back pain.   Neurological: Negative for light-headedness.            Physical Exam:     Vitals:    10/02/19 0943   BP: 115/75   Pulse: 77   Temp: 98.2  F (36.8  C)   TempSrc: Oral   SpO2: 100%   Weight: 63.7 kg (140 lb 6.4 oz)   Height: 1.549 m (5' 1\")     Body mass index is 26.53 kg/m .  Vitals were reviewed and were normal     Physical Exam  Constitutional:       General: She is not in acute distress.     Appearance: She is well-developed. She is not diaphoretic.   HENT:      Head: Normocephalic and atraumatic.   Eyes:      General: No scleral icterus.     Conjunctiva/sclera: Conjunctivae normal.   Neck:      Musculoskeletal: Normal range of motion.   Cardiovascular:      Rate and Rhythm: Normal rate and regular rhythm.      Pulses: Normal pulses.      Heart sounds: No murmur.   Pulmonary:      Effort: Pulmonary effort is " normal. No respiratory distress.      Breath sounds: Normal breath sounds. No stridor. No wheezing or rales.   Chest:      Chest wall: No tenderness.   Abdominal:      General: Abdomen is flat. There is no distension.      Palpations: Abdomen is soft. There is no mass.      Tenderness: There is no tenderness. There is no rebound.   Musculoskeletal: Normal range of motion.         General: No swelling or deformity.   Skin:     General: Skin is warm.   Neurological:      Mental Status: She is alert and oriented to person, place, and time.   Psychiatric:         Mood and Affect: Mood normal.         Behavior: Behavior normal.         Thought Content: Thought content normal.         Judgement: Judgment normal.           Results:   Results are ordered and pending    Assessment and Plan        Lou was seen today for recheck medication.    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 CXR today given recent IGRA positive - normal.  [facility] for [facility] signed today to obtain treatment history. Once this occurs I will complete her form and will call when ready for her.  -     XR CHEST 2 VW; Future       There are no discontinued medications.    Options for treatment and follow-up care were reviewed with the patient. Lou Winston  engaged in the decision making process and verbalized understanding of the options discussed and agreed with the final plan.    Soco Ridley MD

## 2019-10-02 ENCOUNTER — OFFICE VISIT (OUTPATIENT)
Dept: FAMILY MEDICINE | Facility: CLINIC | Age: 79
End: 2019-10-02
Payer: COMMERCIAL

## 2019-10-02 ENCOUNTER — ANCILLARY PROCEDURE (OUTPATIENT)
Dept: GENERAL RADIOLOGY | Facility: CLINIC | Age: 79
End: 2019-10-02
Attending: FAMILY MEDICINE
Payer: COMMERCIAL

## 2019-10-02 VITALS
OXYGEN SATURATION: 100 % | WEIGHT: 140.4 LBS | DIASTOLIC BLOOD PRESSURE: 75 MMHG | HEART RATE: 77 BPM | TEMPERATURE: 98.2 F | BODY MASS INDEX: 26.51 KG/M2 | SYSTOLIC BLOOD PRESSURE: 115 MMHG | HEIGHT: 61 IN

## 2019-10-02 DIAGNOSIS — Z22.7 TB LUNG, LATENT: ICD-10-CM

## 2019-10-02 DIAGNOSIS — Z22.7 TB LUNG, LATENT: Primary | ICD-10-CM

## 2019-10-02 ASSESSMENT — ENCOUNTER SYMPTOMS
BACK PAIN: 0
UNEXPECTED WEIGHT CHANGE: 0
COUGH: 1
APPETITE CHANGE: 0
CHILLS: 0
DYSURIA: 0
FEVER: 0
SHORTNESS OF BREATH: 0
ABDOMINAL PAIN: 0
LIGHT-HEADEDNESS: 0

## 2019-10-02 ASSESSMENT — MIFFLIN-ST. JEOR: SCORE: 1059.23

## 2019-10-02 NOTE — PROGRESS NOTES
Preceptor Attestation:   Patient seen, evaluated and discussed with the resident. I have verified the content of the note, which accurately reflects my assessment of the patient and the plan of care.   Supervising Physician:  Ronaldo Bose MD.

## 2019-10-02 NOTE — PATIENT INSTRUCTIONS
We will obtain records regarding TB treatment history.  We will call you when we have your forms ready

## 2019-10-02 NOTE — NURSING NOTE
Due to patient being non-English speaking/uses sign language, an  was used for this visit. Only for face-to-face interpretation by an external agency, date and length of interpretation can be found on the scanned worksheet.     name: Ira Wray  Agency: [employer]na Hernandez  Language: [facility]   Telephone number: [phone]  Type of interpretation: Face-to-face, spoken    Rubi Carolina MA

## 2019-10-16 ENCOUNTER — OFFICE VISIT (OUTPATIENT)
Dept: FAMILY MEDICINE | Facility: CLINIC | Age: 79
End: 2019-10-16
Payer: COMMERCIAL

## 2019-10-16 VITALS
DIASTOLIC BLOOD PRESSURE: 76 MMHG | WEIGHT: 142 LBS | BODY MASS INDEX: 26.13 KG/M2 | HEART RATE: 83 BPM | OXYGEN SATURATION: 100 % | TEMPERATURE: 98.6 F | RESPIRATION RATE: 16 BRPM | HEIGHT: 62 IN | SYSTOLIC BLOOD PRESSURE: 119 MMHG

## 2019-10-16 DIAGNOSIS — Z23 NEED FOR PROPHYLACTIC VACCINATION AND INOCULATION AGAINST INFLUENZA: ICD-10-CM

## 2019-10-16 DIAGNOSIS — Z22.7 TB LUNG, LATENT: Primary | ICD-10-CM

## 2019-10-16 ASSESSMENT — ANXIETY QUESTIONNAIRES
1. FEELING NERVOUS, ANXIOUS, OR ON EDGE: SEVERAL DAYS
6. BECOMING EASILY ANNOYED OR IRRITABLE: SEVERAL DAYS
5. BEING SO RESTLESS THAT IT IS HARD TO SIT STILL: SEVERAL DAYS
7. FEELING AFRAID AS IF SOMETHING AWFUL MIGHT HAPPEN: MORE THAN HALF THE DAYS
IF YOU CHECKED OFF ANY PROBLEMS ON THIS QUESTIONNAIRE, HOW DIFFICULT HAVE THESE PROBLEMS MADE IT FOR YOU TO DO YOUR WORK, TAKE CARE OF THINGS AT HOME, OR GET ALONG WITH OTHER PEOPLE: SOMEWHAT DIFFICULT
GAD7 TOTAL SCORE: 10
3. WORRYING TOO MUCH ABOUT DIFFERENT THINGS: MORE THAN HALF THE DAYS
2. NOT BEING ABLE TO STOP OR CONTROL WORRYING: MORE THAN HALF THE DAYS

## 2019-10-16 ASSESSMENT — PATIENT HEALTH QUESTIONNAIRE - PHQ9
SUM OF ALL RESPONSES TO PHQ QUESTIONS 1-9: 9
5. POOR APPETITE OR OVEREATING: SEVERAL DAYS

## 2019-10-16 ASSESSMENT — MIFFLIN-ST. JEOR: SCORE: 1074.42

## 2019-10-16 NOTE — NURSING NOTE
Due to patient being non-English speaking/uses sign language, an  was used for this visit. Only for face-to-face interpretation by an external agency, date and length of interpretation can be found on the scanned worksheet.     name: Ira Wray  Agency: [employer]na Hernandez  Language: [facility]   Telephone number: [phone]  Type of interpretation: Face-to-face, spoken

## 2019-10-17 ASSESSMENT — ANXIETY QUESTIONNAIRES: GAD7 TOTAL SCORE: 10

## 2019-10-17 NOTE — TELEPHONE ENCOUNTER
Form has been completed by provider.     [facility]  at Fax Number: [phone]  Patient informed: Yes  Output date: October 17, 2019    Tiana Sanchez CMA      **Please close the encounter**

## 2019-10-20 RX ORDER — TYPHOID VACC,LIVE,ATTENUATED 2B UNIT
CAPSULE,DELAYED RELEASE (ENTERIC COATED) ORAL
COMMUNITY
Start: 2019-05-29 | End: 2022-02-04

## 2019-11-29 ENCOUNTER — OFFICE VISIT (OUTPATIENT)
Dept: FAMILY MEDICINE | Facility: CLINIC | Age: 79
End: 2019-11-29
Payer: COMMERCIAL

## 2019-11-29 VITALS
WEIGHT: 145.2 LBS | TEMPERATURE: 98.5 F | HEART RATE: 77 BPM | SYSTOLIC BLOOD PRESSURE: 106 MMHG | HEIGHT: 62 IN | DIASTOLIC BLOOD PRESSURE: 70 MMHG | BODY MASS INDEX: 26.72 KG/M2 | RESPIRATION RATE: 16 BRPM | OXYGEN SATURATION: 99 %

## 2019-11-29 DIAGNOSIS — R07.89 ATYPICAL CHEST PAIN: Primary | ICD-10-CM

## 2019-11-29 ASSESSMENT — ANXIETY QUESTIONNAIRES
GAD7 TOTAL SCORE: 11
5. BEING SO RESTLESS THAT IT IS HARD TO SIT STILL: MORE THAN HALF THE DAYS
6. BECOMING EASILY ANNOYED OR IRRITABLE: MORE THAN HALF THE DAYS
3. WORRYING TOO MUCH ABOUT DIFFERENT THINGS: SEVERAL DAYS
7. FEELING AFRAID AS IF SOMETHING AWFUL MIGHT HAPPEN: MORE THAN HALF THE DAYS
IF YOU CHECKED OFF ANY PROBLEMS ON THIS QUESTIONNAIRE, HOW DIFFICULT HAVE THESE PROBLEMS MADE IT FOR YOU TO DO YOUR WORK, TAKE CARE OF THINGS AT HOME, OR GET ALONG WITH OTHER PEOPLE: NOT DIFFICULT AT ALL
1. FEELING NERVOUS, ANXIOUS, OR ON EDGE: MORE THAN HALF THE DAYS
2. NOT BEING ABLE TO STOP OR CONTROL WORRYING: SEVERAL DAYS

## 2019-11-29 ASSESSMENT — MIFFLIN-ST. JEOR: SCORE: 1090.74

## 2019-11-29 ASSESSMENT — ENCOUNTER SYMPTOMS
CHEST TIGHTNESS: 1
NAUSEA: 1
WHEEZING: 0
ABDOMINAL PAIN: 0
DIARRHEA: 0
SHORTNESS OF BREATH: 1
STRIDOR: 0
VOMITING: 0
PALPITATIONS: 1

## 2019-11-29 ASSESSMENT — PATIENT HEALTH QUESTIONNAIRE - PHQ9
5. POOR APPETITE OR OVEREATING: SEVERAL DAYS
SUM OF ALL RESPONSES TO PHQ QUESTIONS 1-9: 15

## 2019-11-29 NOTE — PROGRESS NOTES
Preceptor Attestation:   Patient seen, evaluated and discussed with the resident. I personally viewed the EKG and agree with the interpretation documented by the resident. I have verified the content of the note, which accurately reflects my assessment of the patient and the plan of care.   Supervising Physician:  Samantha Ibanez MD.

## 2019-11-29 NOTE — PATIENT INSTRUCTIONS
1. Keep doing your stretches and gel for your chest pain. But also look out for phone call for referral for stress test.   2. EKG today     Thank you for coming to [facility].    **If you had lab testing today and your results are reassuring or normal they will be be mailed to you or sent to your MyChart and you will be notified by email within 7 days.   **If the lab tests need quick action we will call you with the results.  The phone number we will call with results is # [phone] (home)    (home) . If this is not the best number please call our clinic and change the number.  **If any referrals were ordered today you should be getting a call in the next week.  If you don't hear about this within a week please call one of the following numbers   If your referral is for [facility] please call [phone]  If your referral is to outside [facility] please call the clinic number ([phone]) and ask for your team care coordinator.  If you need any refills please call your pharmacy and they will contact us.  If you have any concerns about today's visit or wish to schedule another appointment  please call our office during normal business hours  [phone] (8-5:00 M-F)  If you have urgent medical concerns please call [phone] at any time of the day.  If you have a medical emergency please call 167    Again thank you for choosing [place] and please let us know how we can best partner with you to improve your and your family's health.

## 2019-11-29 NOTE — PROGRESS NOTES
HPI       Lou Winston is a 79 year old  who presents for   Chief Complaint   Patient presents with     Pain     inbetween the breasts        Pain in between breasts along sternum. Feels like it comes from back  Since when back pain is worse chest is worse. Long term pain had it daily for years and seen prior doctors for it. . There every day but seems like it gets worse in winter. Why she came in today.   - Walking long distances makes it come on as well. NO changes with body position. Gel seems like it helps and makes pain go away completely (voltaren).   - no claudication symptoms.   - When pain is very bad gets little nausea, some times palpations. No SOB.   - Can walk slowly forr 5-10 minutes.. but if walks fast pain happens within 1 minute.   - very mild orthopnea but does have it       MDD/Anxiety   Always worse in winter. Cmbalta only takes once a day instead of twice.   A [employer]  was used for  this visit.  Therapy visit once a week at home. Going well overall. Thinks mood is stable to better than what it was a year ago.   +++++++      Problem, Medication and Allergy Lists were      Patient Active Problem List    Diagnosis Date Noted     Chronic lumbar radiculopathy 07/28/2016     Priority: Medium     Chronic pain syndrome 06/24/2016     Priority: Medium     On tramadol since, No  pain contract       Prediabetes 05/11/2016     Priority: Medium     Gastroesophageal reflux disease with esophagitis 04/10/2016     Priority: Medium     Subclinical hyperthyroidism 04/16/2014     Priority: Medium     Osteopenia 01/27/2014     Priority: Medium     Per DEXA scan 12/7/2010. No bisphosphonate recommended given low FRAX score. Repeat recommended in 5 years.     Sun Jerome MD       H. pylori infection 10/21/2013     Priority: Medium     Positive on EGD 5/2015:   Treated        Major depressive disorder, recurrent episode, moderate (H) 04/15/2013     Priority: Medium     Health Care Home 09/18/2012      Priority: Medium     Tier 1  DX V65.8 REPLACED WITH 85142 HEALTH CARE HOME (04/08/2013)       Anxiety disorder 09/18/2012     Priority: Medium     Chronic post-traumatic stress disorder 09/18/2012     Priority: Medium     Chronic tension type headache 09/18/2012     Priority: Medium     Cognitive disorder 09/18/2012     Priority: Medium     Herniated disc 09/18/2012     Priority: Medium     L4 -L5       Limb pain 09/18/2012     Priority: Medium     Lower back pain 09/18/2012     Priority: Medium     Lumbago 09/18/2012     Priority: Medium     Lumbar radiculopathy 09/18/2012     Priority: Medium     Vitamin D deficiency 09/18/2012     Priority: Medium   ,     Current Outpatient Medications   Medication Sig Dispense Refill     acetaminophen (TYLENOL) 500 MG tablet Take 2 tablets (1,000 mg) by mouth every 8 hours as needed for mild pain 100 tablet 11     diclofenac (VOLTAREN) 1 % topical gel Apply 4 grams to lower back four times daily using enclosed dosing card. 100 g 11     DULoxetine (CYMBALTA) 60 MG capsule Take 1 capsule (60 mg) by mouth 2 times daily 60 capsule 11     multivitamin w/minerals (THERA-VIT-M) tablet Take 1 tablet by mouth daily 90 tablet 3     pantoprazole (PROTONIX) 40 MG EC tablet Take 1 tablet (40 mg) by mouth 2 times daily TAKE ONE TABLET BY MOUTH TWO TIMES DAILY 30-60 MINUTES BEFORE A MEAL 60 tablet 1     UNKNOWN TO PATIENT        VIVOTIF CR capsule      ,   No Known Allergies.    Patient is an established patient of this clinic..         Review of Systems:   Review of Systems   Respiratory: Positive for chest tightness and shortness of breath. Negative for wheezing and stridor.    Cardiovascular: Positive for chest pain and palpitations. Negative for leg swelling.   Gastrointestinal: Positive for nausea. Negative for abdominal pain, diarrhea and vomiting.            Physical Exam:     Vitals:    11/29/19 0948   BP: 106/70   BP Location: Left arm   Patient Position: Sitting   Cuff Size:  "Adult Regular   Pulse: 77   Resp: 16   Temp: 98.5  F (36.9  C)   TempSrc: Oral   SpO2: 99%   Weight: 65.9 kg (145 lb 3.2 oz)   Height: 1.565 m (5' 1.61\")     Body mass index is 26.89 kg/m .  Vitals were reviewed and were normal     Physical Exam  Constitutional:       Appearance: She is obese.   Eyes:      Conjunctiva/sclera: Conjunctivae normal.   Cardiovascular:      Rate and Rhythm: Normal rate.      Heart sounds: No murmur.      Comments: Sternum tender from T2/3 to base of sternum   Pulmonary:      Effort: Pulmonary effort is normal. No respiratory distress.      Breath sounds: No wheezing.   Abdominal:      General: Abdomen is flat.      Tenderness: There is no abdominal tenderness.   Skin:     General: Skin is warm.      Capillary Refill: Capillary refill takes less than 2 seconds.      Findings: No rash.   Neurological:      General: No focal deficit present.      Mental Status: She is alert and oriented to person, place, and time.   Psychiatric:         Mood and Affect: Mood normal.         Results are ordered and pending    Assessment and Plan        1. Atypical chest pain    - Echo Stress Echocardiogram; Future  Patient with long history of what seems like non-resolving costochondritis, but symptoms have changed and now seems like it could be a mixed picture with some cardiac symptoms that are worrisome.  Also review of review of systems that can be due to systolic heart failure.  Will have patient complete stress echo and return for follow-up.  Started baby aspirin today for cardiac prevention, age and not be exactly 79 years as stated in could have the potential benefit.   EKG reviewed today in office Showed normal sinus rhythm with no signs of ischemia.  We will follow-up formal read by cardiology     There are no discontinued medications.    Options for treatment and follow-up care were reviewed with the patient. Lou Winston  engaged in the decision making process and verbalized understanding of " the options discussed and agreed with the final plan.    Elpidio Costello MD

## 2019-11-29 NOTE — NURSING NOTE
Due to patient being non-English speaking/uses sign language, an  was used for this visit. Only for face-to-face interpretation by an external agency, date and length of interpretation can be found on the scanned worksheet.     name: Ira Wray  Agency: [employer]na Hernandez  Language: [place]   Telephone number: [phone]  Type of interpretation: Face-to-face, spoken    Althea Peterson CMA on 11/29/2019 at 9:41 AM

## 2019-11-30 ASSESSMENT — ANXIETY QUESTIONNAIRES: GAD7 TOTAL SCORE: 11

## 2019-12-03 ENCOUNTER — TELEPHONE (OUTPATIENT)
Dept: FAMILY MEDICINE | Facility: CLINIC | Age: 79
End: 2019-12-03

## 2019-12-03 NOTE — TELEPHONE ENCOUNTER
[facility] phone call message - order or referral request from patient:     Order or referral being requested: Referral to Cardiology  At Location: ?    Additional Details: Worker at  facility called with the patient to inquire about the Echo Stress Echocardiogram that was ordered on 11/29/19. Patient has not been called for scheduling, and didn't know where to call to schedule. Please call and let them know where they should call for scheduling. Can call the patient or the day care between 9-5.     Referral only -Specialty  Location     OK to leave a message on voice mail? Yes    Primary language: [place]      needed? Yes    Call taken on December 3, 2019 at 3:01 PM by Laine Ford    Order request route to [facility] TRIAGE   Referrals Route to [facility] ([facility]/[place]/[facility]) CARE COORDINATOR

## 2019-12-06 NOTE — TELEPHONE ENCOUNTER
Isaias called back to say the number that was left on [facility] to call is not a correct number ([phone]), goes to a person's home. Could you please call and verify the number?  You can reach Isaias directly at [phone].

## 2019-12-09 NOTE — TELEPHONE ENCOUNTER
Returned call and confirmed number. The correct number is [phone]    Sharyn Davis CMA  Purple Care Coordinator

## 2019-12-09 NOTE — TELEPHONE ENCOUNTER
Isaias from day care center calling in regards to patient's referral for Cardiogram. He is requesting to call the patient at patient's cell phone number. Requesting an urgent call for cardiogram referrals at patient's celll number : [phone]

## 2019-12-11 ENCOUNTER — HOSPITAL ENCOUNTER (OUTPATIENT)
Dept: CARDIOLOGY | Facility: CLINIC | Age: 79
Discharge: HOME | End: 2019-12-11
Attending: FAMILY MEDICINE | Admitting: FAMILY MEDICINE
Payer: COMMERCIAL

## 2019-12-11 DIAGNOSIS — R07.89 ATYPICAL CHEST PAIN: ICD-10-CM

## 2019-12-11 PROCEDURE — 40000264 ECHO STRESS ECHOCARDIOGRAM

## 2019-12-11 PROCEDURE — 93350 STRESS TTE ONLY: CPT | Mod: 26 | Performed by: INTERNAL MEDICINE

## 2019-12-11 PROCEDURE — 93018 CV STRESS TEST I&R ONLY: CPT | Performed by: INTERNAL MEDICINE

## 2019-12-11 PROCEDURE — 93325 DOPPLER ECHO COLOR FLOW MAPG: CPT | Mod: 26 | Performed by: INTERNAL MEDICINE

## 2019-12-11 PROCEDURE — 25500064 ZZH RX 255 OP 636: Performed by: INTERNAL MEDICINE

## 2019-12-11 PROCEDURE — 93321 DOPPLER ECHO F-UP/LMTD STD: CPT | Mod: 26 | Performed by: INTERNAL MEDICINE

## 2019-12-11 PROCEDURE — 93016 CV STRESS TEST SUPVJ ONLY: CPT | Performed by: INTERNAL MEDICINE

## 2019-12-11 RX ADMIN — PERFLUTREN 5 ML: 6.52 INJECTION, SUSPENSION INTRAVENOUS at 08:27

## 2019-12-16 ENCOUNTER — TELEPHONE (OUTPATIENT)
Dept: FAMILY MEDICINE | Facility: CLINIC | Age: 79
End: 2019-12-16

## 2019-12-16 NOTE — TELEPHONE ENCOUNTER
"Called patient to relay results message, unable to get hold or leave message, mail box is full, if patient returns phone call, please relay message, per PCP, \"Please call with results. Please ensure that she knows her heart study was normal. Notify us if the chest pain continues\" MD Macey Sunshine RN    "

## 2019-12-17 ENCOUNTER — OFFICE VISIT (OUTPATIENT)
Dept: FAMILY MEDICINE | Facility: CLINIC | Age: 79
End: 2019-12-17
Payer: COMMERCIAL

## 2019-12-17 VITALS
SYSTOLIC BLOOD PRESSURE: 131 MMHG | HEART RATE: 95 BPM | DIASTOLIC BLOOD PRESSURE: 79 MMHG | TEMPERATURE: 98.1 F | RESPIRATION RATE: 16 BRPM | OXYGEN SATURATION: 96 % | HEIGHT: 62 IN | BODY MASS INDEX: 26.31 KG/M2 | WEIGHT: 143 LBS

## 2019-12-17 DIAGNOSIS — K21.9 GASTROESOPHAGEAL REFLUX DISEASE WITHOUT ESOPHAGITIS: ICD-10-CM

## 2019-12-17 RX ORDER — PANTOPRAZOLE SODIUM 20 MG/1
20 TABLET, DELAYED RELEASE ORAL DAILY
Qty: 30 TABLET | Refills: 3 | Status: SHIPPED | OUTPATIENT
Start: 2019-12-17 | End: 2020-01-27

## 2019-12-17 RX ORDER — PANTOPRAZOLE SODIUM 20 MG/1
20 TABLET, DELAYED RELEASE ORAL DAILY
Qty: 30 TABLET | Refills: 3 | Status: SHIPPED | OUTPATIENT
Start: 2019-12-17 | End: 2019-12-17

## 2019-12-17 ASSESSMENT — ENCOUNTER SYMPTOMS
CONSTIPATION: 0
NAUSEA: 0
ARTHRALGIAS: 0
CHEST TIGHTNESS: 1
HEMATURIA: 0
COUGH: 0
ABDOMINAL PAIN: 1
WHEEZING: 0
BLOOD IN STOOL: 0
SHORTNESS OF BREATH: 0
ABDOMINAL DISTENTION: 1
UNEXPECTED WEIGHT CHANGE: 0
MYALGIAS: 0
FEVER: 0
DIARRHEA: 0
HEADACHES: 0
DYSURIA: 0
WEAKNESS: 0
VOMITING: 0

## 2019-12-17 ASSESSMENT — MIFFLIN-ST. JEOR: SCORE: 1083.89

## 2019-12-17 NOTE — PROGRESS NOTES
HPI       Lou Winston is a 79 year old  who presents for   Chief Complaint   Patient presents with     RECHECK     Was sent to the hospital during last visit but patient states they are feeling much better since leaving, was not admitted       Started high dose PPI and stress test for pain that seemed possible GERD related but had some chest features. Stpped with PPI but only was taking daily instead of twice daily. Has had prior EGD. Stress test normal.     A [employer]  was used for  this visit.        Problem, Medication and Allergy Lists were      Patient Active Problem List    Diagnosis Date Noted     Chronic lumbar radiculopathy 07/28/2016     Priority: Medium     Chronic pain syndrome 06/24/2016     Priority: Medium     On tramadol since, No  pain contract       Prediabetes 05/11/2016     Priority: Medium     Gastroesophageal reflux disease with esophagitis 04/10/2016     Priority: Medium     Subclinical hyperthyroidism 04/16/2014     Priority: Medium     Osteopenia 01/27/2014     Priority: Medium     Per DEXA scan 12/7/2010. No bisphosphonate recommended given low FRAX score. Repeat recommended in 5 years.     Sun Jerome MD       H. pylori infection 10/21/2013     Priority: Medium     Positive on EGD 5/2015:   Treated        Major depressive disorder, recurrent episode, moderate (H) 04/15/2013     Priority: Medium     Health Care Home 09/18/2012     Priority: Medium     Tier 1  DX V65.8 REPLACED WITH [facility]ALTH CARE HOME (04/08/2013)       Anxiety disorder 09/18/2012     Priority: Medium     Chronic post-traumatic stress disorder 09/18/2012     Priority: Medium     Chronic tension type headache 09/18/2012     Priority: Medium     Cognitive disorder 09/18/2012     Priority: Medium     Herniated disc 09/18/2012     Priority: Medium     L4 -L5       Limb pain 09/18/2012     Priority: Medium     Lower back pain 09/18/2012     Priority: Medium     Lumbago 09/18/2012     Priority: Medium      "Lumbar radiculopathy 09/18/2012     Priority: Medium     Vitamin D deficiency 09/18/2012     Priority: Medium   ,     Current Outpatient Medications   Medication Sig Dispense Refill     acetaminophen (TYLENOL) 500 MG tablet Take 2 tablets (1,000 mg) by mouth every 8 hours as needed for mild pain 100 tablet 11     aspirin (ASA) 81 MG EC tablet Take 1 tablet (81 mg) by mouth daily 90 tablet 3     diclofenac (VOLTAREN) 1 % topical gel Apply 4 grams to lower back four times daily using enclosed dosing card. 100 g 11     DULoxetine (CYMBALTA) 60 MG capsule Take 1 capsule (60 mg) by mouth 2 times daily 60 capsule 11     multivitamin w/minerals (THERA-VIT-M) tablet Take 1 tablet by mouth daily 90 tablet 3     pantoprazole (PROTONIX) 20 MG EC tablet Take 1 tablet (20 mg) by mouth daily TAKE ONE TABLET BY MOUTH TWO TIMES DAILY 30-60 MINUTES BEFORE A MEAL 30 tablet 3     UNKNOWN TO PATIENT        VIVOTIF CR capsule      ,   No Known Allergies.    Patient is an established patient of this clinic..         Review of Systems:   Review of Systems   Constitutional: Negative for fever and unexpected weight change.   Respiratory: Positive for chest tightness. Negative for cough, shortness of breath and wheezing.    Cardiovascular: Negative for chest pain.   Gastrointestinal: Positive for abdominal distention and abdominal pain. Negative for blood in stool, constipation, diarrhea, nausea and vomiting.   Genitourinary: Negative for dysuria and hematuria.   Musculoskeletal: Negative for arthralgias and myalgias.   Neurological: Negative for weakness and headaches.            Physical Exam:     Vitals:    12/17/19 1129   BP: 131/79   BP Location: Right arm   Patient Position: Sitting   Cuff Size: Adult Regular   Pulse: 95   Resp: 16   Temp: 98.1  F (36.7  C)   TempSrc: Oral   SpO2: 96%   Weight: 64.9 kg (143 lb)   Height: 1.57 m (5' 1.81\")     Body mass index is 26.32 kg/m .  Vitals were reviewed and were normal     Physical " Exam  Constitutional:       General: She is not in acute distress.     Appearance: She is well-developed.   HENT:      Head: Normocephalic and atraumatic.   Eyes:      Conjunctiva/sclera: Conjunctivae normal.   Cardiovascular:      Rate and Rhythm: Normal rate and regular rhythm.      Heart sounds: Normal heart sounds.   Pulmonary:      Effort: Pulmonary effort is normal. No respiratory distress.   Abdominal:      General: Bowel sounds are normal. There is no distension.      Palpations: Abdomen is soft.      Tenderness: There is no abdominal tenderness.      Comments: No epigastric pain. No reproduction in chest pain    Musculoskeletal: Normal range of motion.   Skin:     General: Skin is warm.      Capillary Refill: Capillary refill takes less than 2 seconds.      Findings: No rash.   Neurological:      Mental Status: She is alert and oriented to person, place, and time.             Assessment and Plan        1. Gastroesophageal reflux disease without esophagitis  Patient's atypical chest pain unlikely to be cardiac with negative stress test and rapid improvement on PPI. Will wean down to 20mg once her 40 runs out. Would eventually like trial off PPI but patient not ready yet despite discussions of prolonged use. Re visit at next visit.   - pantoprazole (PROTONIX) 20 MG EC tablet; Take 1 tablet (20 mg) by mouth daily TAKE ONE TABLET BY MOUTH DAILY BEFORE MEAL  Dispense: 30 tablet; Refill: 3       Medications Discontinued During This Encounter   Medication Reason     pantoprazole (PROTONIX) 40 MG EC tablet Reorder       Options for treatment and follow-up care were reviewed with the patient. Lou Winston  engaged in the decision making process and verbalized understanding of the options discussed and agreed with the final plan.    Elpidio Costello MD

## 2020-01-27 ENCOUNTER — OFFICE VISIT (OUTPATIENT)
Dept: FAMILY MEDICINE | Facility: CLINIC | Age: 80
End: 2020-01-27
Payer: COMMERCIAL

## 2020-01-27 VITALS
WEIGHT: 145.8 LBS | HEIGHT: 61 IN | DIASTOLIC BLOOD PRESSURE: 78 MMHG | SYSTOLIC BLOOD PRESSURE: 123 MMHG | OXYGEN SATURATION: 99 % | BODY MASS INDEX: 27.53 KG/M2 | RESPIRATION RATE: 16 BRPM | TEMPERATURE: 97.9 F | HEART RATE: 51 BPM

## 2020-01-27 DIAGNOSIS — F33.1 MAJOR DEPRESSIVE DISORDER, RECURRENT EPISODE, MODERATE (H): ICD-10-CM

## 2020-01-27 DIAGNOSIS — M54.16 CHRONIC LUMBAR RADICULOPATHY: ICD-10-CM

## 2020-01-27 DIAGNOSIS — R07.89 ATYPICAL CHEST PAIN: ICD-10-CM

## 2020-01-27 DIAGNOSIS — K21.9 GASTROESOPHAGEAL REFLUX DISEASE WITHOUT ESOPHAGITIS: ICD-10-CM

## 2020-01-27 DIAGNOSIS — H04.123 DRY EYES: ICD-10-CM

## 2020-01-27 DIAGNOSIS — M75.01 ADHESIVE CAPSULITIS OF RIGHT SHOULDER: Primary | ICD-10-CM

## 2020-01-27 RX ORDER — PANTOPRAZOLE SODIUM 20 MG/1
20 TABLET, DELAYED RELEASE ORAL DAILY
Qty: 30 TABLET | Refills: 3 | Status: SHIPPED | OUTPATIENT
Start: 2020-01-27 | End: 2020-09-04

## 2020-01-27 RX ORDER — DULOXETIN HYDROCHLORIDE 60 MG/1
60 CAPSULE, DELAYED RELEASE ORAL 2 TIMES DAILY
Qty: 60 CAPSULE | Refills: 11 | Status: SHIPPED | OUTPATIENT
Start: 2020-01-27 | End: 2021-03-01

## 2020-01-27 RX ORDER — ACETAMINOPHEN 500 MG
1000 TABLET ORAL EVERY 8 HOURS PRN
Qty: 100 TABLET | Refills: 11 | Status: SHIPPED | OUTPATIENT
Start: 2020-01-27 | End: 2021-03-01

## 2020-01-27 RX ORDER — MULTIPLE VITAMINS W/ MINERALS TAB 9MG-400MCG
1 TAB ORAL DAILY
Qty: 90 TABLET | Refills: 3 | Status: SHIPPED | OUTPATIENT
Start: 2020-01-27 | End: 2022-02-04

## 2020-01-27 RX ORDER — CARBOXYMETHYLCELLULOSE SODIUM 5 MG/ML
1 SOLUTION/ DROPS OPHTHALMIC 3 TIMES DAILY PRN
Qty: 30 ML | Refills: 3 | Status: SHIPPED | OUTPATIENT
Start: 2020-01-27 | End: 2021-08-12

## 2020-01-27 ASSESSMENT — PAIN SCALES - GENERAL: PAINLEVEL: EXTREME PAIN (8)

## 2020-01-27 ASSESSMENT — ANXIETY QUESTIONNAIRES
3. WORRYING TOO MUCH ABOUT DIFFERENT THINGS: MORE THAN HALF THE DAYS
5. BEING SO RESTLESS THAT IT IS HARD TO SIT STILL: MORE THAN HALF THE DAYS
1. FEELING NERVOUS, ANXIOUS, OR ON EDGE: MORE THAN HALF THE DAYS
6. BECOMING EASILY ANNOYED OR IRRITABLE: SEVERAL DAYS
GAD7 TOTAL SCORE: 11
2. NOT BEING ABLE TO STOP OR CONTROL WORRYING: SEVERAL DAYS
IF YOU CHECKED OFF ANY PROBLEMS ON THIS QUESTIONNAIRE, HOW DIFFICULT HAVE THESE PROBLEMS MADE IT FOR YOU TO DO YOUR WORK, TAKE CARE OF THINGS AT HOME, OR GET ALONG WITH OTHER PEOPLE: VERY DIFFICULT
7. FEELING AFRAID AS IF SOMETHING AWFUL MIGHT HAPPEN: MORE THAN HALF THE DAYS

## 2020-01-27 ASSESSMENT — PATIENT HEALTH QUESTIONNAIRE - PHQ9
5. POOR APPETITE OR OVEREATING: SEVERAL DAYS
SUM OF ALL RESPONSES TO PHQ QUESTIONS 1-9: 13

## 2020-01-27 ASSESSMENT — MIFFLIN-ST. JEOR: SCORE: 1085.33

## 2020-01-27 NOTE — PATIENT INSTRUCTIONS
Here is the plan from today's visit    1. Rotator cuff dysfunction, right  Can use heat or ice as needed for shoulder pain. Take 1000 mg tylenol three times a day and Aleve twice daily with meals for 2 weeks to help address inflammation. Call to schedule PT.  - PHYSICAL THERAPY REFERRAL - EXTERNAL; Future    2. Gastroesophageal reflux disease without esophagitis  Refill provided.  - pantoprazole (PROTONIX) 20 MG EC tablet; Take 1 tablet (20 mg) by mouth daily TAKE ONE TABLET BY MOUTH DAILY BEFORE MEAL  Dispense: 30 tablet; Refill: 3  - multivitamin w/minerals (THERA-VIT-M) tablet; Take 1 tablet by mouth daily  Dispense: 90 tablet; Refill: 3    3. Major depressive disorder, recurrent episode, moderate (H)  Refill provided.  - DULoxetine (CYMBALTA) 60 MG capsule; Take 1 capsule (60 mg) by mouth 2 times daily  Dispense: 60 capsule; Refill: 11    4. Chronic lumbar radiculopathy  Refill provided.  - DULoxetine (CYMBALTA) 60 MG capsule; Take 1 capsule (60 mg) by mouth 2 times daily  Dispense: 60 capsule; Refill: 11  - acetaminophen (TYLENOL) 500 MG tablet; Take 2 tablets (1,000 mg) by mouth every 8 hours as needed for mild pain  Dispense: 100 tablet; Refill: 11    5. Atypical chest pain  Refill provided.  - aspirin (ASA) 81 MG EC tablet; Take 1 tablet (81 mg) by mouth daily  Dispense: 90 tablet; Refill: 3    6. Dry eyes  Refill provided  - carboxymethylcellulose (REFRESH PLUS) 0.5 % SOLN ophthalmic solution; Place 1 drop into both eyes 3 times daily as needed for dry eyes  Dispense: 30 mL; Refill: 3    Exercises for Shoulder Pain  Patient will start range of motion exercise at home. He should perform the following sequence 2-3 times per day with the affected shoulder    1) Ice shoulder for 15 minutes prior to exercises    2) Perform Codman's exercises: bend at waist supporting yourself by resting unaffected arm on table. Left the affected arm go limp and hang down. Rotate arm in clockwise Solomon 10 times. Then switch  "directions are repeat. Continue this cycle for 5 minutes. Move arm in small circles to begin with progressively increasing the circumference of rotation.      3) Wall walk: stand just less that arm's length away from wall. Place hand on wall and use finger to \"walk\" up the wall surface. Stop when limited by pain. \"Walk\" back down to starting position. Repeat for 5 minutes.      2) Ice shoulder for 15 minutes following exercises.    These exercises were demonstrated for the patient in the examine room.    Please call or return to clinic if your symptoms don't go away.    Follow up plan  Please make a clinic appointment for follow up with me (POLA PATEL) in 6  weeks for to see how things are going.    Thank you for coming to [facility] today.  Lab Testing:  **If you had lab testing today and your results are reassuring or normal they will be mailed to you or sent through [employer] within 7 days.   **If the lab tests need quick action we will call you with the results.  The phone number we will call with results is # [phone] (home) . If this is not the best number please call our clinic and change the number.  Medication Refills:  If you need any refills please call your pharmacy and they will contact us.   If you need to  your refill at a new pharmacy, please contact the new pharmacy directly. The new pharmacy will help you get your medications transferred faster.   Scheduling:  If you have any concerns about today's visit or wish to schedule another appointment please call our office during normal business hours [phone] (8-5:00 M-F)  If a referral was made to a [facility] and you don't get a call from central scheduling please call [phone].  If a Mammogram was ordered for you at [facility] call [phone] to schedule or change your appointment.  If you had an XRay/CT/Ultrasound/MRI ordered the number is [phone] to schedule or change your radiology " appointment.   Medical Concerns:  If you have urgent medical concerns please call [phone] at any time of the day.    Caro Nogueira MD

## 2020-01-27 NOTE — PROGRESS NOTES
"       HPI       Lou Winston is a 80 year old  who presents for   Chief Complaint   Patient presents with     Chest Pain     Patient is complaining of chest pain and leg pain for 2days       Right shoulder pain: Patient has shoulder pain starting 3 months ago.  Does not member an injury or fall.  Is having difficulty lifting arm overhead.  Has taken Tylenol and use diclofenac gel and that helps a bit.  No joint swelling, fever, chills, redness of the skin around.  She is never injured this shoulder before    Would also like a refill on all of her medications.    An [employer]  was used for  this visit.    +++++++    Problem, Medication and Allergy Lists were reviewed and updated if needed..    Patient is an established patient of this clinic..         Review of Systems:   Review of Systems  See HPI       Physical Exam:     Vitals:    01/27/20 1535   BP: 123/78   Pulse: 51   Resp: 16   Temp: 97.9  F (36.6  C)   TempSrc: Oral   SpO2: 99%   Weight: 66.1 kg (145 lb 12.8 oz)   Height: 1.56 m (5' 1.42\")     Body mass index is 27.18 kg/m .  Vitals were reviewed and were normal     Physical Exam  Constitutional:       General: She is not in acute distress.     Appearance: She is normal weight.   HENT:      Head: Normocephalic and atraumatic.   Eyes:      Extraocular Movements: Extraocular movements intact.      Conjunctiva/sclera: Conjunctivae normal.   Cardiovascular:      Rate and Rhythm: Normal rate.   Pulmonary:      Effort: Pulmonary effort is normal.   Musculoskeletal:      Comments: Right shoulder exam: there is generalized tenderness to palpation of entire right shoulder, no specific bony tenderness.  No swelling or erythema of the joint noted. Strength exam is limited by pain.  Active range of motion abduction only to 90 degrees and flexion to 90 degrees as well.  Passive abduction and flexion can only get a few more degrees due to pain.  Pain with internal rotation, but external rotation normal.  Unable " to reach behind her back with right arm.    Left arm and shoulder exam normal.   Neurological:      Mental Status: She is alert.           Results:   No testing ordered today    Assessment and Plan        Lou was seen today for chest pain.    Diagnoses and all orders for this visit:    Adhesive capsulitis of right shoulder - Most consistent on exam based on inability to passively move the shoulder past 90 degrees.  Will use Tylenol and ibuprofen for pain and inflammation.  Can also use ice or heat as needed.  Patient has been to [facility] their physical therapy in the past and would like that again.  Will try physical therapy first, but if unsuccessful can consider injection.  Would like to follow-up in 6 weeks..  -     PHYSICAL THERAPY REFERRAL - EXTERNAL; Future    Refills provided:   Gastroesophageal reflux disease without esophagitis  -     pantoprazole (PROTONIX) 20 MG EC tablet; Take 1 tablet (20 mg) by mouth daily TAKE ONE TABLET BY MOUTH DAILY BEFORE MEAL  -     multivitamin w/minerals (THERA-VIT-M) tablet; Take 1 tablet by mouth daily  Major depressive disorder, recurrent episode, moderate (H)  -     DULoxetine (CYMBALTA) 60 MG capsule; Take 1 capsule (60 mg) by mouth 2 times daily  Chronic lumbar radiculopathy  -     DULoxetine (CYMBALTA) 60 MG capsule; Take 1 capsule (60 mg) by mouth 2 times daily  -     acetaminophen (TYLENOL) 500 MG tablet; Take 2 tablets (1,000 mg) by mouth every 8 hours as needed for mild pain  Atypical chest pain  -     aspirin (ASA) 81 MG EC tablet; Take 1 tablet (81 mg) by mouth daily  Dry eyes  -     carboxymethylcellulose (REFRESH PLUS) 0.5 % SOLN ophthalmic solution; Place 1 drop into both eyes 3 times daily as needed for dry eyes         There are no discontinued medications.    Options for treatment and follow-up care were reviewed with the patient. Lou Winston  engaged in the decision making process and verbalized understanding of the options discussed and agreed with  the final plan.    Caro Nogueira MD  PGY-3

## 2020-01-27 NOTE — NURSING NOTE
Due to patient being non-English speaking/uses sign language, an  was used for this visit. Only for face-to-face interpretation by an external agency, date and length of interpretation can be found on the scanned worksheet.     name: Ira Wray  Agency: [employer]  Language: [facility]   Telephone number: [phone]  Type of interpretation: Face-to-face, spoken    Kathie Campo CMA

## 2020-01-28 ASSESSMENT — ANXIETY QUESTIONNAIRES: GAD7 TOTAL SCORE: 11

## 2020-03-05 ENCOUNTER — DOCUMENTATION ONLY (OUTPATIENT)
Dept: FAMILY MEDICINE | Facility: CLINIC | Age: 80
End: 2020-03-05

## 2020-03-20 NOTE — PROGRESS NOTES
Visit to the client's home for annual health risk assessment and PCA assessment.  An  was present.    Current situation/living environment  Client lives in a small apartment with her granddaughter. Her home is neat and tidy and appears well cared for.     Activities of daily living (ADL)/instrumental activities of daily living (IADL) and functional issues  Client needs help with the following ADL's: dressing, grooming and bathing  Client needs help with the following IADL's: shopping, cooking, housekeeping, laundry, managing finances/bills and transportation  Client states she is unable to perform the above due to back and shoulder pain. Client has PCA and homemaking in place for assistance.      Health concerns for today  Client denies any new issues. She reports her back pain has improved but still has R shoulder pain. She has been working with her doctor about this.  Has patient fallen 2 or more times in the last year? No  Has patient fallen with injury in the last year? No    Cognition/mental health  Client does not know time but has no formal education and reports she has never tracked this. She reports feeling scared in her apartment when home alone. She sees a therapist (does not know who) monthly. She is picked up and brought to therapy and the therapist also comes to see her at home. Client reports this is helpful as is going to ADC. ADC being increased to 4 days per week so that client is not home alone as much.    STARS/Med Adherence  Client is non-compliant with the following quality measures: n/a-up to date based on age  Comments: n/a    Client's Plan of Care consists of:  Adult day care (4 days per week), Homemaker services (5 hours per week), Personal care assistance (PCA) (1.75 hours per day) and Transportation    Ashlee Lopes RN  [facility] Care Coordinator  [phone]

## 2020-09-02 NOTE — PROGRESS NOTES
">67 year old Wellness Visit ( Medicare etc)         HPI       This 80 year old female presents as  who presents for a  Annual Wellness Exam.    Other issues patient wants to address today:    none    Clarifications on ROS:  Chest and breast pain - \"long time\" said due to heartburn  NO fever, but burning feeling inside skin. Has sweats when she feels this heat.   Weight loss 137 lbs today, 140 lbs 1 year ago, 145 lbs on 1/27. Poor appetite, not sleeping well. Indigestion. No dark or bloody stools. No change in BMs  SOB, difficulty breathing - after she eats or lays down.  With activity, fatigues easily, and feels short of breath. But no chest pain. Just tired.    Sleep poorly - hard to initiate, hard to maintain (1-2 hrs at a time). Worries a lot. Waits until she is sleepy, after midnight to sleep. Falls asleep at 2-3 AM, out of bed at 5 AM to pray, then lays there but doesn't sleep again. No naps. Goes to adult  where they do lots of exercises with arms and legs. Also walks lots to get ready to sleep, right before bed. Function: poorly due to sleep. Fatigued, can't do anything. Has tried: nothing in past.     [facility]  was used for  this visit.       Patient Active Problem List   Diagnosis     Health Care Home     Anxiety disorder     Chronic post-traumatic stress disorder     Chronic tension type headache     Cognitive disorder     Herniated disc     Limb pain     Lower back pain     Lumbago     Lumbar radiculopathy     Vitamin D deficiency     Major depressive disorder, recurrent episode, moderate (H)     H. pylori infection     Osteopenia     Subclinical hyperthyroidism     Gastroesophageal reflux disease without esophagitis     Prediabetes     Chronic pain syndrome     Chronic lumbar radiculopathy     Advance care planning     Dyspepsia       Past Medical History:   Diagnosis Date     Depressive disorder         Family History   Family history unknown: Yes         Problem List, Family History " and past Medical History reviewed and unchanged/updated.       Health risk Assessment/ Review of Systems:       Constitutional:   Fevers or night sweats?  yes    Eyes:   Vision problems?  NO            Hearing:  Do you feel you have hearing loss?  NO  Cardiovascular:  Chest pain, palpitations, or pain with walking? yes       Respiratory:   Breathing problems or cough? yes  :   Difficulty controlling urination?  yes  Musculoskeletal:   Stiffness or sore joints? yes        Skin:   concerning lesions or moles?  NO           Nervous System:   loss of strength or sensation,  numbness or tingling,  tremor,  dizziness,  headache?  NO         Mental Health:   depression or anxiety,  sleep problems? yes  Cognition:  Memory problems?  yes   Weight Loss: Have you lost 10 or more pounds unintentionally in the previous year?  NO  Energy: How much of the time during the past 3 weeks did you feel tired?   all of the time     PHQ-2 Score:   PHQ-2 ( 1999 Pf[employer]) 1/27/2020 12/17/2019   Q1: Little interest or pleasure in doing things 0 0   Q2: Feeling down, depressed or hopeless 0 0   PHQ-2 Score 0 0       PHQ-9 Score:   [facility] Follow-up to PHQ 10/16/2019 11/29/2019 1/27/2020   PHQ-9 9. Suicide Ideation past 2 weeks Not at all Not at all Not at all     Medical Care:     What other specialists or organizations are involved in your medical care?     Patient Care Team       Relationship Specialty Notifications Start End    Soco Ridley MD PCP - General Student in organized health care education/training program  5/10/19     Phone: [phone] Fax: [phone]         [address], [address] [place] [id number]    Margarita Lopes RN Clinic Care Coordinator   12/5/17     [facility]  [phone]    Soco Ridley MD Assigned PCP   5/21/20     Phone: [phone] Fax: [phone]         2020 [address], [address] [address]          Do you have an advanced directive? Yes      Social History / Home  Safety     Social History     Tobacco Use     Smoking status: Never Smoker     Smokeless tobacco: Never Used   Substance Use Topics     Alcohol use: No     Marital Status:  Who lives in your household? daugher  Does your home have any of the following safety concerns? Loose rugs in the hallway, no grab bars in the bathroom, no handrails on the stairs or have poorly lit areas?  No   Do you feel threatened or controlled by a partner, ex-partner or anyone in your life? {Yes No   Has anyone hurt you physically, for example by pushing, hitting, slapping or kicking you   or forcing you to have sex? No       Functional Status     Do you need help with dressing yourself, bathing, or walking? YES  Do you need help with the phone, transportation, shopping, preparing meals, housework, laundry, medications or managing money? YES   By yourself and not using aids, do you have any difficulty walking up 10 steps  without resting?  YES   By yourself and not using aids, do you have any difficulty walking several hundred yards? No              Risk Behaviors and Healthy Habits     History   Smoking Status     Never Smoker   Smokeless Tobacco     Never Used     How many servings of fruits and vegetables do you eat a day? 2  Exercise: adult dayscare exercised  2-3 days/week for an average of 15-30 minutes  Do you frequently drive without a seatbelt? No   History   Smoking Status     Never Smoker   Smokeless Tobacco     Never Used     Do you use any other drugs? No       Do you use alcohol?No      Functional Ability   Was the patient's timed Up & Go test (Get up from chair walk, 10 feet turn, return to chair and sit down) unsteady or longer than 10 seconds? No     Fall risk:     1. Have you fallen two or more times in the past year? No   2. Have you fallen and had an injury in the past year?  No         Evaulation of Cognitive Function     Family member/caregiver input: Detail: per patient, family has noticed memory changes.  "Gradual, over long time.    1) Repeat 3 items (Leader, Season clock  Repeated 2    2) Clock draw: ABNORMAL   3) 3 item recall: Recalls 2 objects   Results:     Mini-CogTM Copyright RIAN Garcia. Licensed by the author for use in [facility]; reprinted with permission ([email or web address]). All rights reserved.            Other Assessments:     CV Risk based on Pooled Cohort Risk (consider assessing every 4-6 years; consider statin in patients with 10-yr risk > 7.5%):   The ASCVD Risk score (Dunnellon CHRISTOPHER Jr., et al., 2013) failed to calculate for the following reasons:    Cannot find a previous HDL lab    Cannot find a previous total cholesterol lab    Advance Directives: Discussed with patient and family as appropriate.  Has patient completed advance directives? Yes, patient states has an Advance Care Planning document and will bring a copy to the clinic.            Immunization History   Administered Date(s) Administered     DTaP, Unspecified 05/11/2017     FLU 6-35 months 01/31/2013, 09/30/2013     Flu 65+ Years 10/30/2014     HepA-Adult 05/11/2017, 05/29/2019     HepB 04/21/2008, 09/15/2008, 02/23/2010     Influenza (High Dose) 3 valent vaccine 01/17/2017, 02/19/2018, 12/13/2018, 10/16/2019     Influenza (IIV3) PF 01/03/2007, 11/13/2007, 02/23/2010, 11/04/2010, 10/11/2011, 01/31/2013, 09/30/2013     Influenza Vaccine, 6+MO IM (QUADRIVALENT W/PRESERVATIVES) 10/30/2014     MMR 04/21/2008, 04/01/2011     Pneumo Conj 13-V (2010&after) 01/17/2017     Pneumococcal 23 valent 07/12/2007, 05/29/2019     Poliovirus, inactivated (IPV) 10/25/2006     TD (ADULT, 7+) 01/03/2007, 09/25/2007, 04/21/2008     TDAP Vaccine (Adacel) 05/11/2017, 05/29/2019     Typhoid IM 05/11/2017     Varicella 01/03/2007, 07/03/2007, 07/12/2007, 05/29/2019     Reviewed Immunization Record Today    Physical Exam     Vitals: /85   Pulse 83   Temp 98.4  F (36.9  C) (Oral)   Resp 16   Ht 1.575 m (5' 2\")   Wt 62.5 kg (137 lb 12.8 oz)   SpO2 97%  "  BMI 25.20 kg/m    BMI= Body mass index is 25.2 kg/m .  GENERAL APPEARANCE: alert and no distress  HENT: ear canals patent and TM's normal; mouth without ulcers or lesions; and oral mucous membranes moist  Hearing loss screen:   Not Examined   DENTITION:  Good repair?  yes  RESP: lungs clear to auscultation - no rales, rhonchi or wheezes  CV: regular rates and rhythm, no murmur, click or rub, no peripheral edema and peripheral pulses strong  SKIN: no suspicious lesions or rashes  NEURO: Normal strength and tone  MENTAL STATUS EXAM  Appearance: appropriate  Attitude: cooperative  Behavior: normal  Eye Contact: normal  Speech: normal  Orientation: oreinted to person , place, time and situation  Mood:  euthymic  Affect: Mood Congruent  Thought Process: clear      Assessment and Plan     Welcome to Medicare Preventive Visit / Initial Medicare Annual Wellness Exam - reviewed Preventive Services and Plan form with patient as specified in Patient Instructions.    Lou was seen today for wellness visit.    Diagnoses and all orders for this visit:    Encounter for Medicare annual wellness exam    Osteopenia, unspecified location  Osteopenic. Last DXA 2013. Will repeat today. Should ensure VitD-Ca.  -     DX Hip/Pelvis/Spine (DEXA); Future    Insomnia  Age-related, but does affect function significantly. Patient wishing to address dyspepsia first, reports this is major contributor to her poor sleep. See below.  Discussed sleep hygiene today.  Does not want to try any medications yet for sleep. Has tried melatonin in past with minimal effect.    Dyspepsia  Abdominal pain, epigastric  Weight loss  Persistent, failing PPIs but seems to have been decreased on dose today.   Ordered repeat H pylori testing, though likely false negative given PPIs.  Reviewed prior EGD:   EGD 2/21/2019  Stomach: patchy erythema in gastric antrum, possibly corresponding to recently healed erosions. Stomach otherwise normal. Biopsied from body and  antrum to confirm HP infection persistence vs eradication (positive histology 5/2015, completed eradication therapy)  Duodenum: a few focal nonspecific aphthae with scant erythema in the duodenal bulb, duodenum otherwise normal. Suggestive of peptic duodenitis or recent NSAID effect.    She can increase her PPI to twice daily for now.    She is to return in 2 weeks so we can discuss further, and I will review to see if other records from [facility].     Consider repeat EGD.  -     Helicobacter pylori Antigen Stool; Future    Other fatigue  Weight loss  No focal s/s of malignancy. Weight loss not drastic. Likely due to anorexia 2/2 dyspepsia per above.  -     TSH with free T4 reflex  -     CBC with Diff Plt ([facility])    Healthcare maintenance  -     Lipid Meade ([facility])  -     DX Hip/Pelvis/Spine (DEXA); Future    Advance care planning    Prediabetes  Re-check A1c today.  -     Hemoglobin A1c ([facility])    Low vitamin D level  Re-check levels today.   -     Vitamin D Level        Options for treatment and follow-up care were reviewed with the Lou Winston and/or guardian engaged in the decision making process and verbalized understanding of the options discussed and agreed with the final plan.    Soco Ridley MD  RTC in 2 weeks

## 2020-09-03 ENCOUNTER — OFFICE VISIT (OUTPATIENT)
Dept: FAMILY MEDICINE | Facility: CLINIC | Age: 80
End: 2020-09-03
Payer: COMMERCIAL

## 2020-09-03 VITALS
RESPIRATION RATE: 16 BRPM | SYSTOLIC BLOOD PRESSURE: 139 MMHG | WEIGHT: 137.8 LBS | HEART RATE: 83 BPM | HEIGHT: 62 IN | TEMPERATURE: 98.4 F | OXYGEN SATURATION: 97 % | BODY MASS INDEX: 25.36 KG/M2 | DIASTOLIC BLOOD PRESSURE: 85 MMHG

## 2020-09-03 DIAGNOSIS — R73.03 PREDIABETES: ICD-10-CM

## 2020-09-03 DIAGNOSIS — R79.89 LOW VITAMIN D LEVEL: ICD-10-CM

## 2020-09-03 DIAGNOSIS — G47.00 PERSISTENT INSOMNIA: ICD-10-CM

## 2020-09-03 DIAGNOSIS — Z71.89 ADVANCE CARE PLANNING: ICD-10-CM

## 2020-09-03 DIAGNOSIS — R63.4 WEIGHT LOSS: ICD-10-CM

## 2020-09-03 DIAGNOSIS — R10.13 DYSPEPSIA: ICD-10-CM

## 2020-09-03 DIAGNOSIS — R53.83 OTHER FATIGUE: ICD-10-CM

## 2020-09-03 DIAGNOSIS — M85.80 OSTEOPENIA, UNSPECIFIED LOCATION: ICD-10-CM

## 2020-09-03 DIAGNOSIS — R10.13 ABDOMINAL PAIN, EPIGASTRIC: ICD-10-CM

## 2020-09-03 DIAGNOSIS — Z00.00 ENCOUNTER FOR MEDICARE ANNUAL WELLNESS EXAM: Primary | ICD-10-CM

## 2020-09-03 DIAGNOSIS — Z00.00 HEALTHCARE MAINTENANCE: ICD-10-CM

## 2020-09-03 LAB
% GRANULOCYTES: 39.6 %G (ref 40–75)
CHOLEST SERPL-MCNC: 187.9 MG/DL (ref 0–200)
CHOLEST/HDLC SERPL: 3.4 {RATIO} (ref 0–5)
GRANULOCYTES #: 1.6 K/UL (ref 1.6–8.3)
HBA1C MFR BLD: 5.9 % (ref 4.1–5.7)
HCT VFR BLD AUTO: 42.8 % (ref 35–47)
HDLC SERPL-MCNC: 55.6 MG/DL
HEMOGLOBIN: 12.9 G/DL (ref 11.7–15.7)
LDLC SERPL CALC-MCNC: 117 MG/DL (ref 0–129)
LYMPHOCYTES # BLD AUTO: 2.1 K/UL (ref 0.8–5.3)
LYMPHOCYTES NFR BLD AUTO: 52.2 %L (ref 20–48)
MCH RBC QN AUTO: 30.2 PG (ref 26.5–35)
MCHC RBC AUTO-ENTMCNC: 30.1 G/DL (ref 32–36)
MCV RBC AUTO: 100.2 FL (ref 78–100)
MID #: 0.3 K/UL (ref 0–2.2)
MID %: 8.2 %M (ref 0–20)
PLATELET # BLD AUTO: 242 K/UL (ref 150–450)
RBC # BLD AUTO: 4.27 M/UL (ref 3.8–5.2)
TRIGL SERPL-MCNC: 78.4 MG/DL (ref 0–150)
TSH SERPL DL<=0.005 MIU/L-ACNC: 0.92 MU/L (ref 0.4–4)
VLDL CHOLESTEROL: 15.7 MG/DL (ref 7–32)
WBC # BLD AUTO: 4 K/UL (ref 4–11)

## 2020-09-03 ASSESSMENT — MIFFLIN-ST. JEOR: SCORE: 1058.31

## 2020-09-03 NOTE — PROGRESS NOTES
"       HPI       Lou Winston is a 80 year old  who presents for   Chief Complaint   Patient presents with     RECHECK     follow up ER From [place] needs surgery for gallbladder     ED on 8/27 - did an U/S, gallstones w/o cholecystitis   Still feeling sick - the same. Hasn't scheduled with surgery yet  No fevers, no more nausea (taking meds), no vomiting now.  Has not been eating - last ate last night, one spoon of rice. Drinking water OK.      {Superlists :300791:x}    {:420384}   +++++++  {Additional Concerns  (FM):541019}    Problem, Medication and Allergy Lists were {Reviewed Lists:654612}.    Patient is {New/Established:376110::\"an established patient of this clinic.\"}.         Review of Systems:   Review of Systems         Physical Exam:     Vitals:    09/03/20 0859   BP: 110/76   Pulse: 98   Resp: 16   Temp: 98.3  F (36.8  C)   TempSrc: Oral   SpO2: 96%   Weight: 92.9 kg (204 lb 12.8 oz)     Body mass index is 38.17 kg/m .  {[facility] VITALS OPTIONS:015871::\"Vitals were reviewed and were normal\"}     Physical Exam  {  Detailed Ortho and mental status exam:099030}    Results:   {UMP FM result choices :927761}    Assessment and Plan        {Assessment/Plan Pick List :608597}       There are no discontinued medications.    Options for treatment and follow-up care were reviewed with the patient. Lou Winston  engaged in the decision making process and verbalized understanding of the options discussed and agreed with the final plan.    Soco Ridley MD  "

## 2020-09-03 NOTE — PROGRESS NOTES
Preceptor Attestation:    Patient seen and evaluated in person. I discussed the patient with the resident. I have verified the content of the note, which accurately reflects my assessment of the patient and the plan of care.   Supervising Physician:  Adelfo Boss MD, MD.

## 2020-09-03 NOTE — PATIENT INSTRUCTIONS
"Please return in 2 weeks     Recommendations for Insomnia  1. Only go to bed when sleepy.  2. If unable to fall asleep within 20 minutes, get out of bed, go to a different room and read, preferable something uninteresting.  3. Remove or cover up the clock in your bedroom.  3. Avoid caffeine 6 hours before bed.  4. Avoid watching TV one hour before bed time especially not  in bed or leaving the TV on all night.  5. Designate the hour before your bed time as \"worry time.\" Keep a paper to-do list, make a new list every night before going to bed. The goal is for this list to reduce mind racing.   6. Try to exercise 30-60 minutes daily-but avoid vigorous exercise 4-6 hours before bed  7. Try a meditation exercise called the Body Scan  Body Scan Meditation  Lying down now... Breathe in s-l-o-w-l-y and deeply through your nose. Feel your abdomen move outwards as your diaphragm contracts and draws air into your lungs. Your chest should not rise noticeably.  While breathing slowly, direct attention to your left foot. Feel your foot. Curl your toes once to fix your awareness to it. Now relax...  As you breathe in through your nostrils, slowly scan your left leg from foot to knee, and up through your thigh.   As you breathe out, trace your leg down to your foot. Do this 3 times, then take your mind off your breath and remain with your foot.  Feel the sensations in your foot. Simply become aware of them. Scan your left lower leg. Accept any tension or discomfort. Scan slowly, up through your thigh now.  If thoughts appear, that's fine. Gently come back to your breath, and shift awareness over to your right foot.   Slowly inhale while scanning through your right calf, knee, and thigh... Exhale and scan back down. S-l-o-w-l-y. Now let go of your breath and remain with your foot.  Scan for any sensation in your foot... calf... Thigh... Simply accept all sensations and feel what happens. Relax...  Now focus on your stomach. Feel it " [id number]-i-[id number]-i-n-g as you breathe in. Sinking as you exhale. Nice and slow. Your heart probably slows down. This is normal. Remain aware of your stomach, your breath... up and down. Become aware of sensations. Relax...  Now follow the same procedure with your left hand and arm as you did with your leg. You may clench your fist at first to really direct your awareness to your left hand. Breathe...  Now scan up along the length of your arm, to your chest. Then down your right arm to your right hand. Remain there. Breathe. Sense and scan. Relax...  Come back up to your chest. Continue scanning up along your neck and to your face. Gently clench your jaws and release. Feel the sensations in your jaws, your throat. Breathe and scan. Feel how the back of your head rests against the floor.   Scan the top of your head. Relax...  Now detach from all body parts. Breathe... Feel how everything is connected, resting gently on the floor. Just breathe, let any sensation come to you. Accept it as a part of you. Return to your breathing. You are big, sensations are small parts of you. They fluctuate, come and go.  Just breathe for a minute and feel your body. Then sit up slowly.      Personalized Prevention Plan  You are due for the preventive services outlined below.  Your care team is available to assist you in scheduling these services.  If you have already completed any of these items, please share that information with your care team to update in your medical record.  Health Maintenance Due   Topic Date Due     Discuss Advance Care Planning  1940     Depression Action Plan  1940     Zoster (Shingles) Vaccine (1 of 2) 01/01/1992     Annual Wellness Visit  01/10/2020     Depression Assessment  02/27/2020     Flu Vaccine (1) 09/01/2020     Preventive Health Recommendations    See your health care provider every year to    Review health changes.     Discuss preventive care.      Review your medicines if your doctor has  prescribed any.    You no longer need a yearly Pap test unless you've had an abnormal Pap test in the past 10 years. If you have vaginal symptoms, such as bleeding or discharge, be sure to talk with your provider about a Pap test.    Every 1 to 2 years, have a mammogram.  If you are over 69, talk with your health care provider about whether or not you want to continue having screening mammograms.    Every 10 years, have a colonoscopy. Or, have a yearly FIT test (stool test). These exams will check for colon cancer.     Have a cholesterol test every 5 years, or more often if your doctor advises it.     Have a diabetes test (fasting glucose) every three years. If you are at risk for diabetes, you should have this test more often.     At age 65, have a bone density scan (DEXA) to check for osteoporosis (brittle bone disease).    Shots:    Get a flu shot each year.    Get a tetanus shot every 10 years.    Talk to your doctor about your pneumonia vaccines. There are now two you should receive - Pneumovax (PPSV 23) and Prevnar (PCV 13).    Talk to your pharmacist about the shingles vaccine.    Talk to your doctor about the hepatitis B vaccine.    Nutrition:     Eat at least 5 servings of fruits and vegetables each day.    Eat whole-grain bread, whole-wheat pasta and brown rice instead of white grains and rice.    Get adequate Calcium and Vitamin D.     Lifestyle    Exercise at least 150 minutes a week (30 minutes a day, 5 days a week). This will help you control your weight and prevent disease.    Limit alcohol to one drink per day.    No smoking.     Wear sunscreen to prevent skin cancer.     See your dentist twice a year for an exam and cleaning.    See your eye doctor every 1 to 2 years to screen for conditions such as glaucoma, macular degeneration and cataracts.    Personalized Prevention Plan  You are due for the preventive services outlined below.  Your care team is available to assist you in scheduling these  services.  If you have already completed any of these items, please share that information with your care team to update in your medical record.  Health Maintenance   Topic Date Due     ADVANCE CARE PLANNING  1940     DEPRESSION ACTION PLAN  1940     ZOSTER IMMUNIZATION (1 of 2) 01/01/1992     MEDICARE ANNUAL WELLNESS VISIT  01/10/2020     PHQ-9  02/27/2020     INFLUENZA VACCINE (1) 09/01/2020     LIPID  01/27/2021     FALL RISK ASSESSMENT  03/05/2021     DTAP/TDAP/TD IMMUNIZATION (6 - Td) 05/29/2029     DEXA  Completed     PNEUMOCOCCAL IMMUNIZATION 65+ LOW/MEDIUM RISK  Completed     IPV IMMUNIZATION  Aged Out     MENINGITIS IMMUNIZATION  Aged Out     HEPATITIS B IMMUNIZATION  Aged Out

## 2020-09-04 DIAGNOSIS — K21.9 GASTROESOPHAGEAL REFLUX DISEASE WITHOUT ESOPHAGITIS: ICD-10-CM

## 2020-09-04 LAB — DEPRECATED CALCIDIOL+CALCIFEROL SERPL-MC: 24 UG/L (ref 20–75)

## 2020-09-04 RX ORDER — PANTOPRAZOLE SODIUM 20 MG/1
20 TABLET, DELAYED RELEASE ORAL DAILY
Qty: 30 TABLET | Refills: 3 | Status: SHIPPED | OUTPATIENT
Start: 2020-09-04 | End: 2021-04-14

## 2020-09-04 NOTE — TELEPHONE ENCOUNTER
Pantoprazole 20mg last filled 7/2/2020 for #30    Thank you,  Deepika Sparrow, PharmD  [facility]  [phone]

## 2020-09-10 ENCOUNTER — ANCILLARY PROCEDURE (OUTPATIENT)
Dept: BONE DENSITY | Facility: CLINIC | Age: 80
End: 2020-09-10
Attending: FAMILY MEDICINE
Payer: COMMERCIAL

## 2020-09-10 DIAGNOSIS — R10.13 ABDOMINAL PAIN, EPIGASTRIC: ICD-10-CM

## 2020-09-10 DIAGNOSIS — Z00.00 HEALTHCARE MAINTENANCE: ICD-10-CM

## 2020-09-10 DIAGNOSIS — M85.80 OSTEOPENIA, UNSPECIFIED LOCATION: ICD-10-CM

## 2020-09-10 DIAGNOSIS — R10.13 DYSPEPSIA: ICD-10-CM

## 2020-09-10 DIAGNOSIS — R63.4 WEIGHT LOSS: ICD-10-CM

## 2020-09-11 LAB — H PYLORI AG STL QL IA: NEGATIVE

## 2020-09-18 ENCOUNTER — TELEPHONE (OUTPATIENT)
Dept: FAMILY MEDICINE | Facility: CLINIC | Age: 80
End: 2020-09-18

## 2020-09-18 DIAGNOSIS — M85.851 OSTEOPENIA OF NECK OF RIGHT FEMUR: Primary | ICD-10-CM

## 2020-09-18 NOTE — RESULT ENCOUNTER NOTE
Please call patient with the following message:  Pilo Blake, your results from your bone density scanning shows that you have some decreased bone density. This puts you at slightly higher risk of a bone fracture. I would recommend that you begin to take a daily calcium and vitamin D supplement, which I will send to your pharmacy.     Thanks Soco Ridley MD

## 2020-09-18 NOTE — TELEPHONE ENCOUNTER
"From Ridely \"your results from your bone density scanning shows that you have some decreased bone density. This puts you at slightly higher risk of a bone fracture. I would recommend that you begin to take a daily calcium and vitamin D supplement, which I will send to your pharmacy\"    With aid of  # [phone] left a message to return call.  please transfer to any available RN when patient calls back, thank you!    Jory Shafer RN    "

## 2020-09-18 NOTE — LETTER
Lou Winston,        Thank you for receiving your care at \[facility]\"". After several attempts we have been unable to reach you by phone.     The results from your bone density scanning shows that you have some decreased bone density. This puts you at slightly higher risk of a bone fracture. We would recommend that you begin to take a daily calcium and vitamin D supplement, this has been sent to \[facility]\"" [facility].    If you have any further questions please call the clinic at [phone].     Thank you!      Jory Shafer RN

## 2020-09-22 NOTE — TELEPHONE ENCOUNTER
With aid of  # [phone] left a message to return call. After 2 attempts, letter sent.    Jory Shafer RN

## 2020-10-14 NOTE — PROGRESS NOTES
HPI       Lou Winston is a 80 year old  who presents for   Chief Complaint   Patient presents with     Results     Discuss test results     Dizziness     Patient is feeling dizzy today, states it has been worse today than normal. States she has been eating and drinking today.      Results:  - DEXA 9/10/2020 - osteopenia  - A1c 5.9% (from 6.2%)  - .2, CBC wnl otherwise    Dizziness  Dizziness previously - this AM was worse. Usually with standing. Room spinning with head movement. Episodes lasts for minutes. No falls. No nausea, vomiting. Chronic. Function: good. Just bothersome, and worries about falls.   No vision changes. Sometimes tinnitus when she is dizzy. Hasn't noticed significant change in hearing but can't say for sure.     PPI used BID since our last appointment. Was helpful, but still a fair amount of discomfort. EGD in past, 2/2019. Was told she should not have another. No weight loss, blood in stools.     Back pain  Chronic. Very painful. No issues with walking. No loss of sensation. Has been taking some ibuprofen intermittently.   Limited discussion - last minute issue brought up at the end of our 30 minute visit.     An Lavish Skate  was used for  this visit.    +++++++    Problem, Medication and Allergy Lists were reviewed and updated if needed..    Patient is an established patient of this clinic..         Review of Systems:   Review of Systems   Constitutional: Negative for fatigue and fever.   HENT: Positive for tinnitus. Negative for sinus pressure and sinus pain.    Eyes: Negative for photophobia and visual disturbance.   Respiratory: Negative for chest tightness and shortness of breath.    Cardiovascular: Negative for chest pain.   Gastrointestinal: Negative for abdominal pain.   Musculoskeletal: Positive for back pain.   Neurological: Positive for dizziness and light-headedness. Negative for headaches.            Physical Exam:     Vitals:    10/15/20 1659   BP: 136/76    BP Location: Right arm   Patient Position: Sitting   Cuff Size: Adult Regular   Pulse: 86   Temp: 98.4  F (36.9  C)   TempSrc: Oral   SpO2: 100%   Weight: 63.6 kg (140 lb 3.2 oz)     Body mass index is 25.64 kg/m .  Vitals were reviewed and were normal     Physical Exam  Constitutional:       General: She is not in acute distress.     Appearance: She is well-developed. She is not diaphoretic.   HENT:      Head: Normocephalic and atraumatic.   Eyes:      Extraocular Movements: Extraocular movements intact.      Conjunctiva/sclera: Conjunctivae normal.   Neck:      Musculoskeletal: Normal range of motion.   Cardiovascular:      Rate and Rhythm: Normal rate and regular rhythm.      Heart sounds: No murmur.   Pulmonary:      Effort: Pulmonary effort is normal. No respiratory distress.   Musculoskeletal:         General: Tenderness (Paralumbar) present.   Neurological:      General: No focal deficit present.      Mental Status: She is alert.      Comments: Difficult to perform neuro exam given interpreted visit (phone); EOMI grossly intact in all directions with H test though this did elicit discomfort as well as vertigo. HINTS exam unable to be performed despite multiple attempts. [place]-Hallpike negative. Gait normal.            Results:   No testing ordered today    Assessment and Plan        Lou was seen today for results and dizziness.    Diagnoses and all orders for this visit:    Dizziness  Vertigo  Tinnitus  Meniere syndrome, unspecified laterality  Very difficult neuro exam given barriers related to an interpreted visit over phone, but based on history, vertiginous symptoms w/ tinnitus, potentially Meniere syndrome/disease vs non-vertiginous dizziness w/ orthostasis.   - Advised behavioural/lifestyle change for now: avoiding coffee, ensuring hydration.  - If not improved, consider vestibular PT, hydrochlorothiazide, vestibular suppressants but would prefer to avoid antihistamines for now given risk of adverse  effects   - Also consider, if persisting, MRI to r/o vestibular schwannoma, other structural brain lesions    Osteopenia of neck of right femur  Reviewed DEXA results. Advised calcium-vitamin D.    Gastroesophageal reflux disease without esophagitis  Ongoing dyspepsia and reflux, improved with increased PPI at last visit. Does have a hx of H pylori.  Last EGD 2/2019 - patchy erythema gastric antrum, possibly recently healed erosions; gastric body and antrum biopsied (positive for metaplasia, negative for dysplasia, H pylori); duodenum suggestive of peptic duodenitis or recent NSAID effect.  Communication from her GI doc following this:  Given your age and the chronicity of the gastritis, the role for further elective EGD surveillance for intestinal metaplasia in the setting of eradicated H.pylori infection is unclear (and may be deferred if no new worsening or alarm symptoms arise).  Discussed risks and benefits of repeat EGD. Today we have elected to forgo this. We did have a recent stool H pylori negative, though she stayed on her PPIs for this testing so potential for false negative. She is also without alarm symptoms.  Our plan:  - Add tums to her current BID PPI regimen  - Reinforced avoiding NSAIDs    Macrocytosis  Mild macrocytosis, without anemia. Deferred further w/u. She will continue her daily multivitamin.    Strain of lumbar region, initial encounter  Limited history and exam obtained given last minute discussions. No red flags during our brief discussion but will need to get more history.  - Lidocaine patches for symptomatic relief  - Tylenol prn  - Will have her return in 1-2 weeks to discuss further.  -     Lidocaine (LIDOCARE) 4 % Patch; Place 1 patch onto the skin every 24 hours To prevent lidocaine toxicity, patient should be patch free for 12 hrs daily.         There are no discontinued medications.    Options for treatment and follow-up care were reviewed with the patient. Lou Winston   engaged in the decision making process and verbalized understanding of the options discussed and agreed with the final plan.    Soco Ridley MD  RTC in 1-2 weeks

## 2020-10-15 ENCOUNTER — OFFICE VISIT (OUTPATIENT)
Dept: FAMILY MEDICINE | Facility: CLINIC | Age: 80
End: 2020-10-15
Payer: COMMERCIAL

## 2020-10-15 VITALS
TEMPERATURE: 98.4 F | WEIGHT: 140.2 LBS | SYSTOLIC BLOOD PRESSURE: 136 MMHG | BODY MASS INDEX: 25.64 KG/M2 | HEART RATE: 86 BPM | OXYGEN SATURATION: 100 % | DIASTOLIC BLOOD PRESSURE: 76 MMHG

## 2020-10-15 DIAGNOSIS — D75.89 MACROCYTOSIS: ICD-10-CM

## 2020-10-15 DIAGNOSIS — H93.13 TINNITUS, BILATERAL: ICD-10-CM

## 2020-10-15 DIAGNOSIS — R42 DIZZINESS: Primary | ICD-10-CM

## 2020-10-15 DIAGNOSIS — S39.012A STRAIN OF LUMBAR REGION, INITIAL ENCOUNTER: ICD-10-CM

## 2020-10-15 DIAGNOSIS — R42 VERTIGO: ICD-10-CM

## 2020-10-15 DIAGNOSIS — K21.9 GASTROESOPHAGEAL REFLUX DISEASE WITHOUT ESOPHAGITIS: ICD-10-CM

## 2020-10-15 DIAGNOSIS — H81.09 MENIERE SYNDROME, UNSPECIFIED LATERALITY: ICD-10-CM

## 2020-10-15 DIAGNOSIS — M85.851 OSTEOPENIA OF NECK OF RIGHT FEMUR: ICD-10-CM

## 2020-10-15 PROCEDURE — 99214 OFFICE O/P EST MOD 30 MIN: CPT | Mod: GC | Performed by: STUDENT IN AN ORGANIZED HEALTH CARE EDUCATION/TRAINING PROGRAM

## 2020-10-15 RX ORDER — LIDOCAINE 4 G/G
1 PATCH TOPICAL EVERY 24 HOURS
Qty: 10 PATCH | Refills: 1 | Status: SHIPPED | OUTPATIENT
Start: 2020-10-15 | End: 2021-11-18

## 2020-10-15 RX ORDER — CALCIUM CARBONATE 500 MG/1
1 TABLET, CHEWABLE ORAL 3 TIMES DAILY
Qty: 90 TABLET | Refills: 3 | Status: SHIPPED | OUTPATIENT
Start: 2020-10-15 | End: 2021-12-20

## 2020-10-15 NOTE — PROGRESS NOTES
Preceptor Attestation:   Patient seen and discussed with the resident. Assessment and plan reviewed with resident and agreed upon.  Consider hydrochlorothiazide if further concern of Meniere's along with a low salt diet. Monitor BP   Supervising Physician:  Johnathan Flor MD  [facility]

## 2020-10-16 ASSESSMENT — ENCOUNTER SYMPTOMS
PHOTOPHOBIA: 0
FEVER: 0
ABDOMINAL PAIN: 0
SHORTNESS OF BREATH: 0
BACK PAIN: 1
SINUS PRESSURE: 0
LIGHT-HEADEDNESS: 1
DIZZINESS: 1
FATIGUE: 0
SINUS PAIN: 0
HEADACHES: 0
CHEST TIGHTNESS: 0

## 2021-02-08 ENCOUNTER — DOCUMENTATION ONLY (OUTPATIENT)
Dept: FAMILY MEDICINE | Facility: CLINIC | Age: 81
End: 2021-02-08

## 2021-02-10 NOTE — PROGRESS NOTES
Telephone call to the client's home for annual health risk assessment and PCA assessment.  An  was present.    Current situation/living environment  Client lives in a 2 bedroom apartment with her granddaughter. She attends  4 days per week.    Activities of daily living (ADL)/instrumental activities of daily living (IADL) and functional issues  Client needs help with the following ADL's: dressing, grooming and bathing  Client needs help with the following IADL's: shopping, cooking, housekeeping, laundry, managing finances/bills and transportation  Client states she is unable to perform the above due to shoulder and back pain. Client has PCA and homemaking in place for assistance.      Health concerns for today  Client denies any new health concerns and feels she is doing fairly well overall although she does still report shoulder and back pain. She does feel her pain is tolerable with Tylenol.  Has patient fallen 2 or more times in the last year? No  Has patient fallen with injury in the last year? No    Cognition/mental health  Client does not know time or date but this may be cultural. She reports forgetfulness. Her PCA provides medication reminders and helps with appointments. She reports some problems with depression and anxiety which is her baseline. She sees a counselor and attends  4 days/week which she feels is very helpful in managing.     STARS/Med Adherence  Client is non-compliant with the following quality measures: n/a-up to date based on age  Comments: n/a    Client's Plan of Care consists of:  Adult day care (4 days per week), Homemaker services (5 hours per week), Personal care assistance (PCA) (1.75 hours per day) and Transportation    Ashlee Lopes RN  [facility] Care Coordinator  [phone]

## 2021-02-19 ENCOUNTER — OFFICE VISIT (OUTPATIENT)
Dept: FAMILY MEDICINE | Facility: CLINIC | Age: 81
End: 2021-02-19
Payer: COMMERCIAL

## 2021-02-19 VITALS
TEMPERATURE: 98.5 F | WEIGHT: 143.8 LBS | RESPIRATION RATE: 12 BRPM | BODY MASS INDEX: 26.3 KG/M2 | HEART RATE: 85 BPM | DIASTOLIC BLOOD PRESSURE: 78 MMHG | SYSTOLIC BLOOD PRESSURE: 125 MMHG | OXYGEN SATURATION: 97 %

## 2021-02-19 DIAGNOSIS — R68.89 DECREASED EXERCISE TOLERANCE: ICD-10-CM

## 2021-02-19 DIAGNOSIS — R07.89 OTHER CHEST PAIN: Primary | ICD-10-CM

## 2021-02-19 DIAGNOSIS — R06.02 SHORTNESS OF BREATH: ICD-10-CM

## 2021-02-19 DIAGNOSIS — R10.11 ABDOMINAL PAIN, RIGHT UPPER QUADRANT: ICD-10-CM

## 2021-02-19 DIAGNOSIS — R12 HEARTBURN: ICD-10-CM

## 2021-02-19 DIAGNOSIS — M79.10 MUSCLE PAIN: ICD-10-CM

## 2021-02-19 PROCEDURE — 99214 OFFICE O/P EST MOD 30 MIN: CPT | Mod: GC | Performed by: STUDENT IN AN ORGANIZED HEALTH CARE EDUCATION/TRAINING PROGRAM

## 2021-02-19 NOTE — PATIENT INSTRUCTIONS
Please schedule ultrasound and test for the heart (stress test).  If everything is normal, we will have you return to see the stomach doctor.    Lab Testing:  **If you had lab testing today and your results are reassuring or normal they will be mailed to you or sent through [employer] within 7 days.   **If the lab tests need quick action we will call you with the results.  The phone number we will call with results is # [phone] (home) . If this is not the best number please call our clinic and change the number.  Medication Refills:  If you need any refills please call your pharmacy and they will contact us.   If you need to  your refill at a new pharmacy, please contact the new pharmacy directly. The new pharmacy will help you get your medications transferred faster.   Scheduling:  If you have any concerns about today's visit or wish to schedule another appointment please call our office during normal business hours [phone] (8-5:00 M-F)  If a referral was made to a [facility] and you don't get a call from central scheduling please call [phone].  If a Mammogram was ordered for you at [facility] call [phone] to schedule or change your appointment.  If you had an XRay/CT/Ultrasound/MRI ordered the number is [phone] to schedule or change your radiology appointment.   Medical Concerns:  If you have urgent medical concerns please call [phone] at any time of the day.    Soco Ridley MD

## 2021-02-19 NOTE — PROGRESS NOTES
"    Assessment & Plan     Other chest pain  Decreased exercise tolerance  Shortness of breath  Multiple non-specific symptoms - first described as heartburn, but concern on hx given worsening chest pain with ambulation, decreased exercise tolerance. ?orthopnea - difficult hx with language barrier despite . Will obtain NM stress test. Prior exercise echo done but terminated due to fatigue.  - NM Lexiscan stress test    Abdominal pain, right upper quadrant  Heartburn  Pain worse with eating. Abdominal pain is diffuse but includes RUQ.   Hx of EGD w HP in past, last EGD 2/2019 patchy erythema gastric antrum, possibly recently healed erosions; gastric body and antrum biopsied (positive for metaplasia, negative for dysplasia, H pylori); duodenum suggestive of peptic duodenitis or recent NSAID effect.  Patient does not want to pursue repeat EGD despite worsening symptoms based on her discussion with GI provider in past. See my note 10/15. Hence, we will pursue the stress testing, and RUQ U/S to evaluate for cholelithiasis - if these are normal, we will refer back to GI to discuss her refractory dyspepsia.   - US Abdomen Complete    Muscle pain  Consider fibromyalgia/trigger points as etiology of her multiple non-specific pain symptoms. Work-up per above. In future consider assessing dx criteria for fibromyalgia / consider trigger pt injections for her diffuse MSK pain.          BMI:   Estimated body mass index is 26.3 kg/m  as calculated from the following:    Height as of 9/3/20: 1.575 m (5' 2\").    Weight as of this encounter: 65.2 kg (143 lb 12.8 oz).         Return in about 1 month (around 3/19/2021).    Soco Ridley MD  [facility] NANO Blake is a 81 year old who presents for the following health issues  accompanied by her granddaughter:    HPI   Patient presents with:  Heartburn  ongoing heart burn, chest pain  Getting worse despite added on tums  Decreased exercise " tolerance - walking less now and short of breath, chest pain, pain takes over her whole body to neck, back.    Supine - SOB, chest pain.  Also pain when hungry, but pain worse after she eats.  No weight loss.  No n/v, dark stools.      Review of Systems   Constitutional, HEENT, cardiovascular, pulmonary, gi and gu systems are negative, except as otherwise noted.      Objective    /78   Pulse 85   Temp 98.5  F (36.9  C) (Oral)   Resp 12   Wt 65.2 kg (143 lb 12.8 oz)   SpO2 97%   BMI 26.30 kg/m    Body mass index is 26.3 kg/m .  Physical Exam  Constitutional:       General: She is not in acute distress.     Appearance: She is well-developed. She is not diaphoretic.   HENT:      Head: Normocephalic and atraumatic.   Eyes:      Conjunctiva/sclera: Conjunctivae normal.   Neck:      Musculoskeletal: Normal range of motion.   Cardiovascular:      Rate and Rhythm: Normal rate and regular rhythm.   Pulmonary:      Effort: Pulmonary effort is normal. No respiratory distress.   Abdominal:      Palpations: Abdomen is soft.      Tenderness: There is abdominal tenderness (RUQ, epigastric).   Musculoskeletal:      Right lower leg: No edema.      Left lower leg: No edema.      Comments: Multiple tender points thoracic back. No spinal tenderness.   Neurological:      General: No focal deficit present.      Mental Status: She is alert.                ----- Service Performed and Documented by Resident or Fellow ------

## 2021-02-19 NOTE — PROGRESS NOTES
Preceptor Attestation:   Patient seen, evaluated and discussed with the resident. I have verified the content of the note, which accurately reflects my assessment of the patient and the plan of care.   Supervising Physician:  Дмитрий Beck MD

## 2021-03-01 DIAGNOSIS — F33.1 MAJOR DEPRESSIVE DISORDER, RECURRENT EPISODE, MODERATE (H): ICD-10-CM

## 2021-03-01 DIAGNOSIS — M54.16 CHRONIC LUMBAR RADICULOPATHY: ICD-10-CM

## 2021-03-01 RX ORDER — ACETAMINOPHEN 500 MG
1000 TABLET ORAL EVERY 8 HOURS PRN
Qty: 100 TABLET | Refills: 11 | Status: SHIPPED | OUTPATIENT
Start: 2021-03-01 | End: 2021-11-18

## 2021-03-01 RX ORDER — DULOXETIN HYDROCHLORIDE 60 MG/1
60 CAPSULE, DELAYED RELEASE ORAL 2 TIMES DAILY
Qty: 60 CAPSULE | Refills: 11 | Status: SHIPPED | OUTPATIENT
Start: 2021-03-01 | End: 2022-06-06

## 2021-03-01 NOTE — TELEPHONE ENCOUNTER
Duloxetine 60mg last filled 12/4/2020 for #60     Tylenol 500mg last filled 12/4/2020 for #60    Thank you,    Deepika Sparrow, PharmD  [facility]  [phone]

## 2021-03-09 ENCOUNTER — HOSPITAL ENCOUNTER (OUTPATIENT)
Dept: NUCLEAR MEDICINE | Facility: CLINIC | Age: 81
Setting detail: NUCLEAR MEDICINE
End: 2021-03-09
Attending: FAMILY MEDICINE
Payer: COMMERCIAL

## 2021-03-09 ENCOUNTER — HOSPITAL ENCOUNTER (OUTPATIENT)
Dept: CARDIOLOGY | Facility: CLINIC | Age: 81
End: 2021-03-09
Attending: FAMILY MEDICINE
Payer: COMMERCIAL

## 2021-03-09 ENCOUNTER — HOSPITAL ENCOUNTER (OUTPATIENT)
Dept: ULTRASOUND IMAGING | Facility: CLINIC | Age: 81
Discharge: HOME | End: 2021-03-09
Attending: FAMILY MEDICINE | Admitting: FAMILY MEDICINE
Payer: COMMERCIAL

## 2021-03-09 VITALS — HEART RATE: 78 BPM | DIASTOLIC BLOOD PRESSURE: 65 MMHG | SYSTOLIC BLOOD PRESSURE: 131 MMHG

## 2021-03-09 DIAGNOSIS — R10.11 ABDOMINAL PAIN, RIGHT UPPER QUADRANT: ICD-10-CM

## 2021-03-09 DIAGNOSIS — R07.89 OTHER CHEST PAIN: ICD-10-CM

## 2021-03-09 LAB
CV STRESS MAX HR HE: 111
RATE PRESSURE PRODUCT: NORMAL
STRESS ECHO BASELINE DIASTOLIC HE: 65
STRESS ECHO BASELINE HR: 72
STRESS ECHO BASELINE SYSTOLIC BP: 131
STRESS ECHO CALCULATED PERCENT HR: 79 %
STRESS ECHO LAST STRESS DIASTOLIC BP: 57
STRESS ECHO LAST STRESS SYSTOLIC BP: 107
STRESS ECHO TARGET HR: 141

## 2021-03-09 PROCEDURE — 78452 HT MUSCLE IMAGE SPECT MULT: CPT | Mod: 26 | Performed by: INTERNAL MEDICINE

## 2021-03-09 PROCEDURE — 76700 US EXAM ABDOM COMPLETE: CPT

## 2021-03-09 PROCEDURE — 78452 HT MUSCLE IMAGE SPECT MULT: CPT

## 2021-03-09 PROCEDURE — A9502 TC99M TETROFOSMIN: HCPCS | Performed by: FAMILY MEDICINE

## 2021-03-09 PROCEDURE — 93018 CV STRESS TEST I&R ONLY: CPT | Performed by: INTERNAL MEDICINE

## 2021-03-09 PROCEDURE — 76700 US EXAM ABDOM COMPLETE: CPT | Mod: 26 | Performed by: RADIOLOGY

## 2021-03-09 PROCEDURE — 343N000001 HC RX 343: Performed by: FAMILY MEDICINE

## 2021-03-09 PROCEDURE — 93017 CV STRESS TEST TRACING ONLY: CPT

## 2021-03-09 PROCEDURE — 93016 CV STRESS TEST SUPVJ ONLY: CPT | Performed by: INTERNAL MEDICINE

## 2021-03-09 PROCEDURE — 250N000011 HC RX IP 250 OP 636: Performed by: INTERNAL MEDICINE

## 2021-03-09 RX ORDER — ALBUTEROL SULFATE 90 UG/1
2 AEROSOL, METERED RESPIRATORY (INHALATION) EVERY 5 MIN PRN
Status: DISCONTINUED | OUTPATIENT
Start: 2021-03-09 | End: 2021-03-10 | Stop reason: HOSPADM

## 2021-03-09 RX ORDER — REGADENOSON 0.08 MG/ML
0.4 INJECTION, SOLUTION INTRAVENOUS ONCE
Status: COMPLETED | OUTPATIENT
Start: 2021-03-09 | End: 2021-03-09

## 2021-03-09 RX ORDER — AMINOPHYLLINE 25 MG/ML
50-100 INJECTION, SOLUTION INTRAVENOUS
Status: DISCONTINUED | OUTPATIENT
Start: 2021-03-09 | End: 2021-03-10 | Stop reason: HOSPADM

## 2021-03-09 RX ORDER — ACYCLOVIR 200 MG/1
0-1 CAPSULE ORAL
Status: DISCONTINUED | OUTPATIENT
Start: 2021-03-09 | End: 2021-03-10 | Stop reason: HOSPADM

## 2021-03-09 RX ORDER — CAFFEINE CITRATE 20 MG/ML
60 SOLUTION INTRAVENOUS
Status: DISCONTINUED | OUTPATIENT
Start: 2021-03-09 | End: 2021-03-10 | Stop reason: HOSPADM

## 2021-03-09 RX ADMIN — REGADENOSON 0.4 MG: 0.08 INJECTION, SOLUTION INTRAVENOUS at 10:35

## 2021-03-09 RX ADMIN — TETROFOSMIN 37 MCI.: 1.38 INJECTION, POWDER, LYOPHILIZED, FOR SOLUTION INTRAVENOUS at 10:37

## 2021-03-09 RX ADMIN — TETROFOSMIN 10.5 MCI.: 1.38 INJECTION, POWDER, LYOPHILIZED, FOR SOLUTION INTRAVENOUS at 09:10

## 2021-03-09 NOTE — PROGRESS NOTES
Pt here for Lexiscan nuclear stress test.  Medication and side effects reviewed with patient through her [place] . Patient  Denies any history of asthma nor COPD.  Denied caffeine use.  Patient tolerated Lexiscan dose without any adverse reactions.  VSS.  Monitored post injection and then taken to the gold waiting room and instructed to wait there for nuclear medicine tech for follow up imaging.  
Detail Level: Zone

## 2021-03-10 NOTE — RESULT ENCOUNTER NOTE
Please call patient with the following message: Ultrasound of abdomen shows fatty liver, and some cysts in the liver. These are not likely causing her symptoms. There were no gallstones or gallbladder inflammation also.  Thanks Soco Ridley MD

## 2021-04-13 DIAGNOSIS — K21.9 GASTROESOPHAGEAL REFLUX DISEASE WITHOUT ESOPHAGITIS: ICD-10-CM

## 2021-04-13 NOTE — TELEPHONE ENCOUNTER
Pantoprazole 20mg last filled 2/26/2021 for #30    Thank you,    Deepika Sparrow, PharmD  [facility] [address]  [phone]

## 2021-04-14 RX ORDER — PANTOPRAZOLE SODIUM 20 MG/1
20 TABLET, DELAYED RELEASE ORAL DAILY
Qty: 30 TABLET | Refills: 3 | Status: SHIPPED | OUTPATIENT
Start: 2021-04-14 | End: 2021-09-30

## 2021-07-08 NOTE — PROGRESS NOTES
Assessment & Plan     Chronic lumbar radiculopathy  Osteopenia of neck of right femur  Fibromyalgia   81 year old female with prior MR imaging showing degenerative disc disease and mild foraminal stenosis of the lumbar spine, presenting with ongoing right lower back and leg pain. No red flag symptoms and neurologic exam is normal today. No indications for repeat imaging. Discussed options including lumbar steroid injection, NSAIDs, physical therapy. Patient states she has had a back injection before- unable to find documentation of this in her chart, there is a diagnostic right L5 nerve root block that was done in 2008 and was documented as providing complete relief of her right leg and back pain. She is not interested in pursuing injection at this time as she is under the impression that her prior one did not help. She prefers to try NSAIDs for now and is open to PT if pain is not improving. I do suspect fibromyalgia may be contributing as well, as she does have documentation of this diagnosis previously in her chart and is on duloxetine, had tenderness to light touch on exam. Did not explore mental health thoroughly today, will plan to do so at future visit.   - calcium carbonate-vitamin D (OS-MONI) 500-400 MG-UNIT tablet; Take 1 tablet by mouth 2 times daily  - ibuprofen (ADVIL/MOTRIN) 600 MG tablet; Take 1 tablet (600 mg) by mouth every 6 hours as needed for moderate pain (back and leg pain)  - diclofenac (VOLTAREN) 1 % topical gel; Apply 4 g topically 4 times daily as needed for moderate pain (back pain)    Health care maintenance  - This visit was originally scheduled as Medicare Annual Wellness Visit -- discussed with patient that since she had this in 9/2020, not due again until 9/2021. Routed to  to call and schedule when appointments are open.     Review of external notes as documented elsewhere in note  Review of the result(s) of each unique test - prior MRI lumbar spine x3, fluoroguide spinal  "injection  Prescription drug management       No follow-ups on file.    Yu Wadsworth MD  [facility] NANO Blake is a 81 year old with subclinical hyperthyroidism, lumbar radiculopathy with chronic pain, GERD, MDD, PTSD, anxiety disorder who presents for the following health issues  accompanied by :  Patient presents with:  Pain: pt has been having right leg pain for awhile but it has gotten worse in the last few weeks. the pain goes all down the right leg. the pain is sharp.   Forms: [facility] form for health review and authorization for release of medical information     HPI     Right back pain  Patient new to me, reviewing prior notes has a history of diffuse MSK pain, consider trigger point injections assess dx criteria for fibromyalgia.   Also with chronic lumbar radiculopathy- MRI 2017 showed mild foraminal stenosis  bilaterally at both L3-L4 and L4-L5  4/05/2019 see for right leg pain, lateral and posterior knee     Onset/Duration: many years, back pain worsening recently, right leg pain unchanged   Description  Location: right back, right leg  Joint Swelling: no  Redness: no  Pain: YES  Warmth: no  Intensity:  severe  Progression of Symptoms:  worsening  Accompanying signs and symptoms:   Fevers: no  Numbness/tingling/weakness: No tingling or weakness. When back pain starts she feels numbness \"from the waist down\", R>L   History  Trauma to the area: no  Recent illness:  no  Previous similar problem: yes for several years  Previous evaluation:  YES  Precipitating or alleviating factors:  Aggravating factors include: standing and walking  Therapies tried and outcome: Tylenol helps, has been taking cymbalta though ran out recently     Hard time getting up from lying flat  Waist down pain on the right side   When the back pain starts both legs hurt, when back is ok it is her right leg  Worst at night   The pain has made it hard to walk  Does " "get swelling in both feet     Injection -- believes she had a few in the past, most recent ones were not helpful     Form Completion  Completed [facility] form with patient in the office today       Review of Systems    ROS: 10 point ROS neg other than the symptoms noted above in the HPI.       Objective    /68   Pulse 92   Temp 98.2  F (36.8  C) (Oral)   Resp 16   Ht 1.65 m (5' 4.96\")   Wt 64.9 kg (143 lb)   SpO2 99%   BMI 23.83 kg/m    Body mass index is 23.83 kg/m .  Physical Exam  Constitutional:       General: She is not in acute distress.     Appearance: She is well-developed. She is not diaphoretic.   Cardiovascular:      Rate and Rhythm: Normal rate.   Pulmonary:      Effort: Pulmonary effort is normal. No respiratory distress.   Musculoskeletal:      Right hip: She exhibits normal range of motion.      Left hip: She exhibits normal range of motion.      Cervical back: She exhibits no bony tenderness.      Thoracic back: She exhibits no bony tenderness.      Lumbar back: She exhibits decreased range of motion (full flexion, decreased extension-- worsens pain) and tenderness (paraspinal muscles bilaterally). She exhibits no bony tenderness, no swelling and no deformity.      Right lower leg: No edema.      Left lower leg: No edema.      Comments: No pain with MEHDI or FADIR test bilaterally   Neurological:      General: No focal deficit present.      Mental Status: She is alert.      Sensory: No sensory deficit.      Motor: No weakness.      Gait: Gait abnormal (antalgic).   Psychiatric:         Mood and Affect: Mood normal.          No results found for this or any previous visit (from the past 24 hour(s)).          "

## 2021-07-08 NOTE — PATIENT INSTRUCTIONS
Patient Education     Understanding Lumbar Radiculopathy    Lumbar radiculopathy is irritation or inflammation of a nerve root in the low back. It causes symptoms that spread out from the back down one or both legs. To understand this condition, it helps to understand the parts of the spine:    Vertebrae. These are bones that stack to form the spine. The lumbar spine contains 5 vertebrae near the bottom of your spine.    Disks. These are soft pads of tissue between the vertebrae. They act as shock absorbers for the spine.    Spinal canal. This is a tunnel formed within the stacked vertebrae. In the lumbar spine, nerves run through this canal.    Nerves. These branch off and leave the spinal canal, traveling out to parts of the body. As they leave the spinal canal, nerves pass through openings between the vertebrae. The nerve root is the part of the nerve that is closest to the spinal canal.    Sciatic nerve. This is a large nerve formed from several nerve roots in the low back. This nerve extends down the back of the leg to the foot.  With lumbar radiculopathy, nerve roots in the low back become irritated. This leads to pain and symptoms. The sciatic nerve is commonly involved, so the condition is often called sciatica.  What causes lumbar radiculopathy?  Aging, injury, poor posture, extra body weight, and other issues can lead to problems in the low back. These problems may then irritate nerve roots. They include:    Damage to a disk in the lumbar spine. The damaged disk may then press on nearby nerve roots.    Degeneration from wear and tear, and aging. This can lead to narrowing (stenosis) of the openings between the vertebrae. The narrowed openings press on nerve roots as they leave the spinal canal.    Unstable spine. This is when a vertebra slips forward. It can then press on a nerve root.  Other, less common things can put pressure on nerves in the low back. These include diabetes, infection, or a  tumor.  Symptoms of lumbar radiculopathy  These include:    Pain in the low back    Pain, numbness, tingling, or weakness that travels into the buttocks, hip, groin, or leg    Muscle spasms in the low back, or leg  Treatment for lumbar radiculopathy  In most cases, your healthcare provider will first try treatments that help relieve symptoms. These may include:    Prescription and over-the-counter pain medicines. These help relieve pain, swelling, and irritation.    Limits on positions and activities that increase pain. But lying in bed or avoiding all movement is only recommended for a short period of time.    Physical therapy, including exercises and stretches. This helps decrease pain and increase movement and function.    Steroid shots into the lower back. This may help relieve symptoms for a time.    Weight-loss program. If you are overweight, losing extra pounds (kilograms) may help relieve symptoms.  In some cases, you may need surgery to fix the underlying problem. This depends on the cause, the symptoms, and how long the pain has lasted.  Possible complications  Over time, an irritated and inflamed nerve may become damaged. This may lead to long-lasting (permanent) numbness or weakness in your legs and feet. If symptoms change suddenly or get worse, be sure to let your healthcare provider know.  When to call your healthcare provider  Call your healthcare provider right away if you have any of these:    New pain or pain that gets worse    New or increasing weakness, tingling, or numbness in your leg or foot    Problems controlling your bladder or bowel  Beth last reviewed this educational content on 2/1/2020 2000-2021 [employer]. All rights reserved. This information is not intended as a substitute for professional medical care. Always follow your healthcare professional's instructions.

## 2021-07-09 ENCOUNTER — OFFICE VISIT (OUTPATIENT)
Dept: FAMILY MEDICINE | Facility: CLINIC | Age: 81
End: 2021-07-09
Payer: COMMERCIAL

## 2021-07-09 VITALS
HEIGHT: 65 IN | HEART RATE: 92 BPM | SYSTOLIC BLOOD PRESSURE: 112 MMHG | BODY MASS INDEX: 23.82 KG/M2 | WEIGHT: 143 LBS | RESPIRATION RATE: 16 BRPM | DIASTOLIC BLOOD PRESSURE: 68 MMHG | OXYGEN SATURATION: 99 % | TEMPERATURE: 98.2 F

## 2021-07-09 DIAGNOSIS — F33.1 MAJOR DEPRESSIVE DISORDER, RECURRENT EPISODE, MODERATE (H): ICD-10-CM

## 2021-07-09 DIAGNOSIS — M85.851 OSTEOPENIA OF NECK OF RIGHT FEMUR: ICD-10-CM

## 2021-07-09 DIAGNOSIS — M54.16 CHRONIC LUMBAR RADICULOPATHY: Primary | ICD-10-CM

## 2021-07-09 PROCEDURE — 99214 OFFICE O/P EST MOD 30 MIN: CPT | Mod: GC | Performed by: STUDENT IN AN ORGANIZED HEALTH CARE EDUCATION/TRAINING PROGRAM

## 2021-07-09 RX ORDER — IBUPROFEN 600 MG/1
600 TABLET, FILM COATED ORAL EVERY 6 HOURS PRN
Qty: 100 TABLET | Refills: 1 | Status: SHIPPED | OUTPATIENT
Start: 2021-07-09 | End: 2021-08-12

## 2021-07-09 ASSESSMENT — MIFFLIN-ST. JEOR: SCORE: 1123.9

## 2021-07-09 NOTE — Clinical Note
-   Please call Lou to schedule a follow up visit via below recommendations:    Provider: PCP: Yu Wadsworth, Provider on PCP's [employer]  Timeframe: September 2021 once schedule open   Visit Type: In person   Reason for visit: Medicare Annual Wellness     Thanks,  Yu Wadsworth MD

## 2021-08-12 ENCOUNTER — OFFICE VISIT (OUTPATIENT)
Dept: FAMILY MEDICINE | Facility: CLINIC | Age: 81
End: 2021-08-12
Payer: COMMERCIAL

## 2021-08-12 VITALS
SYSTOLIC BLOOD PRESSURE: 122 MMHG | OXYGEN SATURATION: 97 % | BODY MASS INDEX: 24.16 KG/M2 | RESPIRATION RATE: 16 BRPM | DIASTOLIC BLOOD PRESSURE: 75 MMHG | HEART RATE: 77 BPM | WEIGHT: 145 LBS | TEMPERATURE: 98.4 F

## 2021-08-12 DIAGNOSIS — F33.1 MAJOR DEPRESSIVE DISORDER, RECURRENT EPISODE, MODERATE (H): ICD-10-CM

## 2021-08-12 DIAGNOSIS — H04.123 DRY EYES: ICD-10-CM

## 2021-08-12 DIAGNOSIS — M54.16 CHRONIC LUMBAR RADICULOPATHY: Primary | ICD-10-CM

## 2021-08-12 PROCEDURE — 99213 OFFICE O/P EST LOW 20 MIN: CPT | Mod: GC | Performed by: STUDENT IN AN ORGANIZED HEALTH CARE EDUCATION/TRAINING PROGRAM

## 2021-08-12 RX ORDER — CARBOXYMETHYLCELLULOSE SODIUM 5 MG/ML
1 SOLUTION/ DROPS OPHTHALMIC 3 TIMES DAILY PRN
Qty: 30 ML | Refills: 3 | Status: SHIPPED | OUTPATIENT
Start: 2021-08-12 | End: 2022-10-27

## 2021-08-12 NOTE — NURSING NOTE
History and Physical    Patient:                 MRN#: [id number] FIN: [id number]  MARGARET SANDRA   Age:         44         Sex: F          : 1975  Author:      Rg Johnston M.D.    DATE OF ADMISSION:     CHIEF COMPLAINT: Abnormal GI tract x-ray.    HISTORY: This is a 44-year-old female, who was referred to me after an abnormal GI tract x-ray.  The reports or x-rays were not available for my review.  The patient denies any GI symptoms.    PAST MEDICAL HISTORY: Hypertension.    MEDICATIONS: Amlodipine.    ALLERGIES: Penicillin.    PHYSICAL EXAMINATION: GENERAL   Well-developed, well-nourished female, in no acute distress.     LUNGS   Clear.     HEART   Normal S1, S2.    IMPRESSION: Abnormal gastrointestinal tract x-ray.    PLAN: Gastroscopy.        _________________________________  SUNITHA Ospina/Dante  DP:  [id number]  DD:  2019 09:32:55  DT:  2019 14:03:11  Job #:  [id number]       Due to patient being non-English speaking/uses sign language, an  was used for this visit. Only for face-to-face interpretation by an external agency, date and length of interpretation can be found on the scanned worksheet.     name: Roni Alarcon  Agency: [employer]  Language: Marshallese   Telephone number: [phone]  Type of interpretation: Face-to-face, spoken

## 2021-08-12 NOTE — PROGRESS NOTES
Assessment & Plan     Chronic lumbar radiculopathy  Osteopenia of neck of of R femur  Major depressive disorder, recurrent episode, moderate (H)  Fibromyalgia: Patient presents in a pretty similar manner as her last clinic visit on 7/9. She has prior MR imaging showing degenerative disc disease and mild foraminal stenosis of the lumbar spine. She presents today with continued low back and R leg pain. Did report leg swelling though no edema, erythema or calf tenderness noted on exam today, making DVT less likely. No weight loss or other red flag symptoms. She says she has run out of her Cymbalta and Tylenol, so this is likely causing exacerbation of her pain. Reviewing her Rx history, it does appear that she should have refills left in the pharmacy. She has done physical therapy for the pain in the past, this was offered again today. She prefers to re-start her medications first before trying PT again due to COVID19.    - Patient education provided on requesting refills from the pharmacy. Patient and family advised to visit the pharmacy to set up automatic refills to ensure she knows when she can pick it up when she starts to run out.    Dry eyes: Patient would like a refill on her eye drops today. Refill submitted.    - carboxymethylcellulose (REFRESH PLUS) 0.5 % SOLN ophthalmic solution  Dispense: 30 mL; Refill: 3    No follow-ups on file.     Joselito Carolina, MS3    I was present with the medical student who participated in the service and in the documentation of this note. I have verified the history and personally performed the physical exam and medical decision making, and have verified the content of the note, which accurately reflects my assessment of the patient and the plan of care.  Yu Wadsworth MD  [facility] NANO Blake is a 81 year old with a history of chronic lumbar radiculopathy (on duloxetine) who presents for the following health issues accompanied by  "her  (telephone): lumbar back pain with R leg pain, now also with swelling in the past 6 months    HPI     Onset/Duration: Several years  Description:   Location of pain: Right lower back, right leg  Character of pain: \"Dull ache\"   Pain radiation: Radiates into the right foot  New numbness or weakness in legs, not attributed to pain: Weakness and numbness occasionally, but this is not a new problem   Intensity: Pain is severe, sometimes wakes her up   Progression of Symptoms: Initially improved since last visit, now worsening again. States pain is similar to the pain she was experiencing during last clinic visit   History:            Any previous MRI or X-rays: Prior MR imaging 8/2017 showed mild degenerative disc disease and mild foraminal stenosis of L3-L4 and L4-L5, no disc herniation, no significant change from previous MRI 2011  Sees a specialist for back pain: No  Alleviating factors: Cymbalta. However, she states that has run out of this medication and would like a refill.   Precipitating factors: \"Comes by itself, leaves by itself.\" Worse at night, states pain sometimes wakes her up, so she is often tired.   Therapies tried and outcome: Used to do physical therapy but due to COVID had to stop. Used to use ibuprofen, which provided temporary relief, but this upset her stomach. She is now taking Tylenol - also needs a refill of this. Has done steroid shots x 4, this helped transiently.   Accompanying Signs & Symptoms:  Risk of Fracture: Age >64   Risk of Cauda Equina: None (no saddle anesthesia, no bowel/bladder changes)   Risk of Infection: None (no fever)   Risk of Cancer: She thinks she is losing weight.   Risk of Ankylosing Spondylitis: No    7/09/2021: Seen for same complaint, recommended PT, which she declined. Recommended injection, which she also declined stating it was not helpful in the past (review of chart shows diagnostic right L5 nerve root block done in 2008 documented as providing " complete relief of her right leg and back pain). Suspected fibromyalgia contributing as well-- tenderness to light touch on exam and history in chart.     Review of Systems: Per HPI.         Objective    /75   Pulse 77   Temp 98.4  F (36.9  C) (Oral)   Resp 16   Wt 65.8 kg (145 lb)   SpO2 97%   BMI 24.16 kg/m    Body mass index is 24.16 kg/m .     Physical Exam   GENERAL: Healthy, alert. No acute distress.   CV: Normal rate.   RESP: Normal respiratory effort. Able to participate in a 15 min conversation with no distress.   MSK: Trouble lying down on exam room table due to pain. Decreased range of motion of R hip due to pain. No tenderness to palpation of cervical and thoracic spine. Tenderness to palpation of lumbar paraspinal muscles. Decreased range of motion of back due to pain. No erythema or edema of bilateral legs. No calf tenderness. Distal pedal pulses intact.  NEURO: No focal deficits present. Sensation intact. 5/5 strength of hips, knees, and ankles, but with noted pain on R side.       No results found for this or any previous visit (from the past 24 hour(s)).

## 2021-08-24 NOTE — PROGRESS NOTES
Preceptor Attestation:   Patient seen, evaluated and discussed with the resident. I have verified the content of the note, which accurately reflects my assessment of the patient and the plan of care.   Supervising Physician:  Mya Fay, DO

## 2021-09-01 NOTE — PROGRESS NOTES
"  Assessment & Plan     {Dia Picklist:485315}    No follow-ups on file.    Yu Wadsworth MD  [facility] NANO Balke is a 81 year old with chronic lumbar radiculopathy, osteopenia who presents for the following health issues {ACCOMPANIED BY STATEMENT (Optional):214295}    HPI     Leg pain  Chronic low back and R leg pain - often after running out of Cymbalta. Has done PT in the past, which has been helpful and offered again more recently but has preferred to avoid due to COVID1. She is vaccinated.       Back pain   Duration: ***        Specific cause: {.:697284::\"none\"}    Description:   Location of pain: {.:557562}  Character of pain: {.:078191}  Pain radiation:{.:260203::\"none\"}  New numbness or weakness in legs, not attributed to pain:  { :788877::\"No \"}  Any new bowel or bladder incontinence?{ :621059::\"No \"}    Intensity: {.:665068::\"Currently ***/10\"}    History:   Pain interferes with job/home/school: { :066991::\"No \"}  History of back problems: {.:486231::\"no prior back problems\"}  Any previous MRI or X-rays: {.:910608::\"None\"}  Sees a specialist for back pain:  { :147466::\"No\"}  Therapies tried without relief: {.:957880}    Alleviating factors:   Improved by: {.:684786}      Precipitating factors:  Worsened by: {.:285656::\"Nothing\"}    Prior MR imaging 8/2017 showed mild degenerative disc disease and mild foraminal stenosis of L3-L4 and L4-L5, no disc herniation, no significant change from previous MRI 2011      Accompanying Signs & Symptoms:  Risk of Fracture: Age >64   Risk of Cauda Equina: None (no saddle anesthesia, no bowel/bladder changes)   Risk of Infection: None (no fever)   Risk of Cancer: *** weight loss   Risk of Ankylosing Spondylitis: No        7/09/2021: Seen for same complaint, recommended PT, which she declined. Recommended injection, which she also declined stating it was not helpful in the past (review of chart shows diagnostic right L5 nerve " root block done in 2008 documented as providing complete relief of her right leg and back pain). Suspected fibromyalgia contributing as well-- tenderness to light touch on exam and history in chart.       Review of Systems    ROS: 10 point ROS neg other than the symptoms noted above in the HPI.       Objective    There were no vitals taken for this visit.  There is no height or weight on file to calculate BMI.  Physical Exam   ***  {Diagnostic Test Results (Optional):759473}

## 2021-09-02 ENCOUNTER — OFFICE VISIT (OUTPATIENT)
Dept: FAMILY MEDICINE | Facility: CLINIC | Age: 81
End: 2021-09-02
Payer: COMMERCIAL

## 2021-09-02 VITALS
HEIGHT: 62 IN | WEIGHT: 144.6 LBS | HEART RATE: 68 BPM | RESPIRATION RATE: 16 BRPM | DIASTOLIC BLOOD PRESSURE: 68 MMHG | BODY MASS INDEX: 26.61 KG/M2 | TEMPERATURE: 98 F | OXYGEN SATURATION: 98 % | SYSTOLIC BLOOD PRESSURE: 111 MMHG

## 2021-09-02 DIAGNOSIS — R41.3 MEMORY LOSS: ICD-10-CM

## 2021-09-02 DIAGNOSIS — R10.13 DYSPEPSIA: ICD-10-CM

## 2021-09-02 DIAGNOSIS — K21.9 GASTROESOPHAGEAL REFLUX DISEASE WITHOUT ESOPHAGITIS: ICD-10-CM

## 2021-09-02 DIAGNOSIS — Z00.00 ENCOUNTER FOR MEDICARE ANNUAL WELLNESS EXAM: ICD-10-CM

## 2021-09-02 DIAGNOSIS — Z71.89 ADVANCED DIRECTIVES, COUNSELING/DISCUSSION: Primary | ICD-10-CM

## 2021-09-02 DIAGNOSIS — R73.03 PREDIABETES: ICD-10-CM

## 2021-09-02 LAB
HBA1C MFR BLD: 6.1 % (ref 0–5.6)
HCV AB SERPL QL IA: NONREACTIVE

## 2021-09-02 PROCEDURE — 83036 HEMOGLOBIN GLYCOSYLATED A1C: CPT | Performed by: STUDENT IN AN ORGANIZED HEALTH CARE EDUCATION/TRAINING PROGRAM

## 2021-09-02 PROCEDURE — 86803 HEPATITIS C AB TEST: CPT | Performed by: STUDENT IN AN ORGANIZED HEALTH CARE EDUCATION/TRAINING PROGRAM

## 2021-09-02 PROCEDURE — 99397 PER PM REEVAL EST PAT 65+ YR: CPT | Mod: GC | Performed by: STUDENT IN AN ORGANIZED HEALTH CARE EDUCATION/TRAINING PROGRAM

## 2021-09-02 PROCEDURE — 36415 COLL VENOUS BLD VENIPUNCTURE: CPT | Performed by: STUDENT IN AN ORGANIZED HEALTH CARE EDUCATION/TRAINING PROGRAM

## 2021-09-02 ASSESSMENT — MIFFLIN-ST. JEOR: SCORE: 1084.15

## 2021-09-02 NOTE — PATIENT INSTRUCTIONS
If you had an XRay/CT/Ultrasound/MRI ordered the number is [phone] to schedule or change your radiology appointment.   Medical Concerns:  If you have urgent medical concerns please call [phone] at any time of the day.    Yu Wadsworth MD  [facility] - SERVICES     Review these tests with your doctor then decide which ones you want and take this page home for your reference  Immunization History   Administered Date(s) Administered     COVID-19,PF,Moderna 02/04/2021, 03/04/2021     DTaP, Unspecified 05/11/2017     FLU 6-35 months 01/31/2013, 09/30/2013     Flu 65+ Years 10/30/2014     HepA-Adult 05/11/2017, 05/29/2019     HepB 04/21/2008, 09/15/2008, 02/23/2010     Influenza (High Dose) 3 valent vaccine 01/17/2017, 02/19/2018, 12/13/2018, 10/16/2019     Influenza (IIV3) PF 01/03/2007, 11/13/2007, 02/23/2010, 11/04/2010, 10/11/2011, 01/31/2013, 09/30/2013     Influenza Vaccine, 6+MO IM (QUADRIVALENT W/PRESERVATIVES) 10/30/2014     MMR 04/21/2008, 04/01/2011     Pneumo Conj 13-V (2010&after) 01/17/2017     Pneumococcal 23 valent 07/12/2007, 05/29/2019     Poliovirus, inactivated (IPV) 10/25/2006     TD (ADULT, 7+) 01/03/2007, 09/25/2007, 04/21/2008     TDAP Vaccine (Adacel) 05/11/2017, 05/29/2019     Tdap (Adacel,Boostrix) 05/11/2017, 05/29/2019     Typhoid IM 05/11/2017     Varicella 01/03/2007, 07/03/2007, 07/12/2007, 05/29/2019          IMMUNIZATIONS Description Recommend today?     Influenza (flu shot) Helps to prevent flu; you should get it every year No: is not indicated today.   PCV 13 Prevents pneumonia; given once No; is up to date.   PPSV 23 Prevents pneumonia; given once No; is up to date.   Tetanus Prevents tetanus; need every 10 years No; is up to date.   Herpes Zoster (shingles) Prevents or lessens symptoms from shingles; given once No; is up to date.   Hepatitis B  If you have any of the following risk factors you should be immunized for  hepatitis B: severe kidney disease, people who live in the same house as a carrier of Hepatitis B virus, people who live in  institutions (e.g. nursing homes or group homes), homosexual men, patients with hemophilia who received Factor VIII or IX concentrates, abusers of illicit injectable drugs No: is not indicated today.           Health Maintenance   Topic Date Due     HEPATITIS C SCREENING  Never done     ZOSTER IMMUNIZATION (1 of 2) Never done     PHQ-9  02/27/2020     INFLUENZA VACCINE (1) 09/01/2021     FALL RISK ASSESSMENT  02/08/2022     MEDICARE ANNUAL WELLNESS VISIT  09/02/2022     LIPID  09/03/2025     ADVANCE CARE PLANNING  09/02/2026     DTAP/TDAP/TD IMMUNIZATION (6 - Td or Tdap) 05/29/2029     DEXA  09/10/2035     Pneumococcal Vaccine: 65+ Years  Completed     COVID-19 Vaccine  Completed     DEPRESSION ACTION PLAN  Addressed     IPV IMMUNIZATION  Aged Out     MENINGITIS IMMUNIZATION  Aged Out     HEPATITIS B IMMUNIZATION  Aged Out       SCREENING TESTS     Description   Year of Last Screening   Recommended Today?   Heart disease screening blood tests    Cholesterol level Reducing cholesterol can reduce your risk of heart attacks by 25%.  Screening is recommended yearly if you are at risk of heart disease otherwise every 4-5 years  No; is up to date.   Diabetes screening tests    Hemoglobin A1c blood test   Finding and treating diabetes early can reduce complications.  Screening recommended/covered yearly if you have high blood pressure, high cholesterol, obesity (BMI >30), or a history of high blood glucose tests; or 2 of the following: family history of diabetes, overweight (BMI >25 but <30), age 65 years or older, and a history of diabetes of pregnancy or gave birth to baby weighing more than 9 lbs. 9/2020 No; is up to date.   Hepatitis B screening Finding hepatitis B early can reduce complications.  Screening is recommended for persons with selected risk factors.  No; is up to date.   Hepatitis C  screening Finding hepatitis C early can reduce complications.  Screening is recommended for all persons born from 1945 through 1965 and for those with selected other risk factors.   Yes; Recommended and ordered.   HIV screening Finding HIV early can reduce complications.  Screening is recommended for persons with risk factors for HIV infection.  No; is up to date.   Glaucoma screening Early detection of glaucoma can prevent blindness.   Please talk to your eye doctor about this.           SCREENING TESTS     Description   Year of Last Screening   Recommended Today?   Colorectal cancer screening    Fecal occult blood test     Screening colonoscopy Screening for colon cancer has been shown to reduce death from colon cancer by 25-30%. Screening recommended to start at 50 years and continuing until age 75 years.    Yes: discussed risks and benefits and patient wishes to proceed with screening.   Breast Cancer Screening (women)    Mammogram Mammogram screening for breast cancer has been shown to reduce the risk of dying from breast cancer and prolong life. Screening is recommended every 1-2 years for women aged 50 to 74 years.   Yes: discussed risks and benefits and patient wishes to proceed with screening.   Cervical Cancer screening (women)    Pap Cervical pap smears can reduce cervical cancer. Screening is recommended annually if high risk (history of abnormal pap smears) otherwise every 2-3 years, stop screening at 65 years of age if history of normal paps.  No: is not indicated today.   Screening for Osteoporosis:  Bone mass measurements (women)    Dexa Scan Screening and treating Osteoporosis can reduce the risk of hip and spine fractures. Screening is recommended in women 65 years or older and in women and men at risk of osteoporosis.  No; is up to date.   Screening for Lung Cancer     Low-dose CT scanning Screening can reduce mortality in persons aged 55-80 who have smoked at least 30 pack-years and who are  either still smoking or have quit in the past 15 years.  No: is not indicated today.   Abdominal Aortic Aneurysm (AAA) screening    Ultrasound (US)   An aneurysm treated before rupture is very safe -a ruptured aneurysm can be fatal.  Screening  by US for AAA is limited to patients who meet one of the following criteria:    Men who are 65-75 years old and have smoked more than 100 cigarettes in their lifetime    Anyone with a family history of abdominal aortic aneurysm  No: is not indicated today.       MEDICARE WELLNESS EXAM PATIENT INSTRUCTIONS    Yearly exam:     See your health care provider every year in order to review changes in your health, review medicines that you take, and discuss preventive care needs such as immunizations and cancer screening.    Get a flu shot each year.     Advance Directive:    If you have not done so, you are encouraged to complete an advance directive. If you would like support with this, please contact the clinic  through the main clinic line. More information about advance directives can be found at:     [email or web address]    Nutrition:     Eat at least 5 servings of fruits and vegetables each day.     Eat whole-grain bread, whole-wheat pasta and brown rice instead of white grains and rice.     Talk to your doctor about Calcium and Vitamin D.     Lifestyle:    Exercise for at least 150 minutes a week (30 minutes a day, 5 days a week). This will help you control your weight and prevent disease.     Limit alcohol to one drink per day.     If you smoke, try to quit - your doctor will be happy to help.     Wear sunscreen to prevent skin cancer.     See your dentist every six months for an exam and cleaning.     See your eye doctor every 1 to 2 years to screen for conditions such as glaucoma, macular degeneration and cataracts.          Patient Education   Personalized Prevention Plan  You are due for the preventive services  outlined below.  Your care team is available to assist you in scheduling these services.  If you have already completed any of these items, please share that information with your care team to update in your medical record.  Health Maintenance Due   Topic Date Due     Hepatitis C Screening  Never done     Zoster (Shingles) Vaccine (1 of 2) Never done     Depression Assessment  02/27/2020     Flu Vaccine (1) 09/01/2021

## 2021-09-02 NOTE — PROGRESS NOTES
>67 year old Wellness Visit ( Medicare etc)         HPI       This 81 year old female presents as an established patient  Yu Wadsworth who presents for a  Annual Wellness Exam.    Other issues patient wants to address today:    none    An [employer]   was used for  this visit.     Patient Active Problem List   Diagnosis     Health Care Home     Anxiety disorder     Chronic post-traumatic stress disorder     Chronic tension type headache     Cognitive disorder     Herniated disc     Limb pain     Lower back pain     Lumbago     Lumbar radiculopathy     Vitamin D deficiency     Major depressive disorder, recurrent episode, moderate (H)     H. pylori infection     Osteopenia     Subclinical hyperthyroidism     Gastroesophageal reflux disease without esophagitis     Prediabetes     Chronic pain syndrome     Chronic lumbar radiculopathy     Advance care planning     Dyspepsia       Past Medical History:   Diagnosis Date     Depressive disorder         Family History   Family history unknown: Yes         Problem List, Family History and past Medical History reviewed and unchanged/updated.       Health risk Assessment/ Review of Systems:     Constitutional:   Fevers or night sweats?  NO    Eyes:   Vision problems?           Hearing:  Do you feel you have hearing loss?  NO  Cardiovascular:  Chest pain, palpitations, or pain with walking? yes     Respiratory:   Breathing problems or cough?  yes   :   Difficulty controlling urination?  NO        Musculoskeletal:   Stiffness or sore joints?  yes           Skin:   concerning lesions or moles?  NO           Nervous System:   loss of strength or sensation,  numbness or tingling,  tremor,  dizziness,  headache?  NO         Mental Health:   depression or anxiety,  sleep problems?yes  Cognition:  Memory problems?  yes     Weight Loss: Have you lost 10 or more pounds unintentionally in the previous year?  NO  Energy: How much of the time during the past 3 weeks did  you feel tired?   (check one of the following):   ?  All of the time  ? Most of the time  ? Some of the time  ? A little of the time  ? None of the Time    PHQ-2 Score:   PHQ-2 ( 1999 Pf[employer]) 7/9/2021 1/27/2020   Q1: Little interest or pleasure in doing things 0 0   Q2: Feeling down, depressed or hopeless 0 0   PHQ-2 Score 0 0       PHQ-9 Score:   [facility] Follow-up to PHQ 10/16/2019 11/29/2019 1/27/2020   PHQ-9 9. Suicide Ideation past 2 weeks Not at all Not at all Not at all     Medical Care:     What other specialists or organizations are involved in your medical care?  none   Patient Care Team       Relationship Specialty Notifications Start End    Yu Wadsworth MD PCP - General Family Medicine Admissions 7/9/21     Phone: [phone] Fax: [phone]         [address] [place] [id number]    Margarita Lopes RN Clinic Care Coordinator   12/5/17     [facility]  [phone]    Yu Wadsworth MD Assigned PCP   7/16/21         Do you have an advanced directive? No-ACP Packet given to patient.      Social History / Home Safety     Social History     Tobacco Use     Smoking status: Never Smoker     Smokeless tobacco: Never Used   Substance Use Topics     Alcohol use: No     Marital Status:Single  Who lives in your household? daughter  Does your home have any of the following safety concerns? Loose rugs in the hallway, no grab bars in the bathroom, no handrails on the stairs or have poorly lit areas?  No   Do you feel threatened or controlled by a partner, ex-partner or anyone in your life? {Yes No   Has anyone hurt you physically, for example by pushing, hitting, slapping or kicking you   or forcing you to have sex? No       Functional Status     Do you need help with dressing yourself, bathing, or walking? YES   Do you need help with the phone, transportation, shopping, preparing meals, housework, laundry, medications or managing money? YES   By yourself and not using aids, do you  have any difficulty walking up 10 steps  without resting?  YES   By yourself and not using aids, do you have any difficulty walking several hundred yards? No              Risk Behaviors and Healthy Habits     History   Smoking Status     Never Smoker   Smokeless Tobacco     Never Used     How many servings of fruits and vegetables do you eat a day? 1-2  Exercise:leg exercises sometimes   Do you frequently drive without a seatbelt? No   History   Smoking Status     Never Smoker   Smokeless Tobacco     Never Used     Do you use any other drugs? No       Do you use alcohol?No      Functional Ability   Was the patient's timed Up & Go test (Get up from chair walk, 10 feet turn, return to chair and sit down) unsteady or longer than 10 seconds? No     Fall risk:     1. Have you fallen two or more times in the past year? No   2. Have you fallen and had an injury in the past year?  No         Evaulation of Cognitive Function     Family member/caregiver input: Normal    1) Repeat 3 items (Leader, Season, Table): Repeats 1 object  2) Clock draw: ABNORMA  3) 3 item recall: Recalls 1 object   Results: ABNORMAL clock, 1-2 items recalled: PROBABLE COGNITIVE IMPAIRMENT,    Mini-CogTM Copyright S Jose. Licensed by the author for use in [facility]; reprinted with permission (humera@[email or web address]). All rights reserved.        Other Assessments:     CV Risk based on Pooled Cohort Risk (consider assessing every 4-6 years; consider statin in patients with 10-yr risk > 7.5%):   The 10-year ASCVD risk score (Rekhajason WHITE Jr., et al., 2013) is: 17.6%    Values used to calculate the score:      Age: 81 years      Sex: Female      Is Non- : Yes      Diabetic: No      Tobacco smoker: No      Systolic Blood Pressure: 111 mmHg      Is BP treated: No      HDL Cholesterol: 55.6 mg/dL      Total Cholesterol: 187.9 mg/dL    Advance Directives: Discussed with patient and family as appropriate.  Has patient completed advance  "directives? No, advance care planning information given to patient to review.  Advanced care planning was discussed at today's visit.    Immunization History   Administered Date(s) Administered     COVID-19,PF,Moderna 02/04/2021, 03/04/2021     DTaP, Unspecified 05/11/2017     FLU 6-35 months 01/31/2013, 09/30/2013     Flu 65+ Years 10/30/2014     HepA-Adult 05/11/2017, 05/29/2019     HepB 04/21/2008, 09/15/2008, 02/23/2010     Influenza (High Dose) 3 valent vaccine 01/17/2017, 02/19/2018, 12/13/2018, 10/16/2019     Influenza (IIV3) PF 01/03/2007, 11/13/2007, 02/23/2010, 11/04/2010, 10/11/2011, 01/31/2013, 09/30/2013     Influenza Vaccine, 6+MO IM (QUADRIVALENT W/PRESERVATIVES) 10/30/2014     MMR 04/21/2008, 04/01/2011     Pneumo Conj 13-V (2010&after) 01/17/2017     Pneumococcal 23 valent 07/12/2007, 05/29/2019     Poliovirus, inactivated (IPV) 10/25/2006     TD (ADULT, 7+) 01/03/2007, 09/25/2007, 04/21/2008     TDAP Vaccine (Adacel) 05/11/2017, 05/29/2019     Tdap (Adacel,Boostrix) 05/11/2017, 05/29/2019     Typhoid IM 05/11/2017     Varicella 01/03/2007, 07/03/2007, 07/12/2007, 05/29/2019     Reviewed Immunization Record Today    Physical Exam     Vitals: /68   Pulse 68   Temp 98  F (36.7  C) (Oral)   Resp 16   Ht 1.575 m (5' 2\")   Wt 65.6 kg (144 lb 9.6 oz)   SpO2 98%   BMI 26.45 kg/m    BMI= Body mass index is 26.45 kg/m .  GENERAL APPEARANCE: alert and no distress  HENT: ear canals patent and TM's normal; mouth without ulcers or lesions; and oral mucous membranes moist  Hearing loss screen:  Normal   DENTITION:  Good repair?  yes  RESP: lungs clear to auscultation - no rales, rhonchi or wheezes  CV: regular rates and rhythm, no murmur, click or rub, no peripheral edema and peripheral pulses strong  SKIN: no suspicious lesions or rashes. Vertical ridging of all finger nails noted.  NEURO: Normal strength and tone  MENTAL STATUS EXAM  Appearance: appropriate  Attitude: cooperative  Behavior: " normal  Eye Contact: normal  Speech: normal  Orientation: oreinted to person , place, time and situation  Mood:  normal  Affect: Mood Congruent  Thought Process: clear    Assessment and Plan     Welcome to Medicare Preventive Visit - reviewed Preventive Services and Plan form with patient as specified in Patient Instructions.    (Z71.89) Advanced directives, counseling/discussion  (primary encounter diagnosis)  Comment: Only have ACP docs in English, discussed importance of having conversation with loved ones about wishes for end of life, etc     (R73.03) Prediabetes  Plan: Hemoglobin A1c    (Z00.00) Encounter for Medicare annual wellness exam  Plan: Hepatitis C Screen Reflex to HCV RNA Quant and         Genotype, Hemoglobin A1c, Fecal colorectal         cancer screen FIT, MA Screening Digital         Bilateral     (R41.3) Memory loss   Memory concerns by report and testing- recommend scheduling follow up appointment to discuss further, complete MOCA, etc.     (K21.9) Gastroesophageal reflux disease without esophagitis  (R10.13) Dyspepsia  Comment: ongoing symptoms despite omeprazole, prior PCP recommended GI referral if work up for alternative etiologies was negative (negative ACS work up, no gallstones).   Plan: Adult Gastro Ref - Consult Only    Options for treatment and follow-up care were reviewed with the Lou EDOUARD Shajasoni and/or guardian engaged in the decision making process and verbalized understanding of the options discussed and agreed with the final plan.    Yu Wadsworth MD

## 2021-09-03 ENCOUNTER — APPOINTMENT (OUTPATIENT)
Dept: INTERPRETER SERVICES | Facility: CLINIC | Age: 81
End: 2021-09-03
Payer: COMMERCIAL

## 2021-09-06 ENCOUNTER — LAB (OUTPATIENT)
Dept: LAB | Facility: CLINIC | Age: 81
End: 2021-09-06
Payer: COMMERCIAL

## 2021-09-06 DIAGNOSIS — Z00.00 ENCOUNTER FOR MEDICARE ANNUAL WELLNESS EXAM: ICD-10-CM

## 2021-09-06 PROCEDURE — 82274 ASSAY TEST FOR BLOOD FECAL: CPT

## 2021-09-07 NOTE — RESULT ENCOUNTER NOTE
"Please call patient with the following message: Your labs from last week look ok. Your hepatitis C test was normal (no hepatitis). Your A1c was 6.1, which is a little higher than last year but remains in the \"pre-diabetes\" range. Let us know if you need any help scheduling your mammogram and don't forget to bring back your FIT stool test.  Thanks! Mervat"

## 2021-09-10 LAB — HEMOCCULT STL QL IA: NEGATIVE

## 2021-09-10 NOTE — RESULT ENCOUNTER NOTE
Please call patient with the following message: Your stool test screening for colorectal cancer was negative/normal.  Thanks!

## 2021-09-14 ENCOUNTER — TELEPHONE (OUTPATIENT)
Dept: FAMILY MEDICINE | Facility: CLINIC | Age: 81
End: 2021-09-14

## 2021-09-14 NOTE — TELEPHONE ENCOUNTER
RN left message for patient to contact clinic via  [phone]. When patient calls back, please relay message below from Dr. Wadsworth. Okay to transfer to any available RN if they have questions.   KAI Tapia Jacquelyn N, MD  P [facility] Triage Nurse  Please call patient with the following message: Your stool test screening for colorectal cancer was negative/normal.   Thanks!

## 2021-09-16 NOTE — PROGRESS NOTES
Preceptor Attestation:   Patient seen, evaluated and discussed with the resident. I have verified the content of the note, which accurately reflects my assessment of the patient and the plan of care.   Supervising Physician:  May Fay, DO

## 2021-09-17 NOTE — TELEPHONE ENCOUNTER
See encounter from 9/7. RN relayed all results to patient using language line. Patient verbalized understanding.     Mera Powell RN

## 2021-09-30 DIAGNOSIS — K21.9 GASTROESOPHAGEAL REFLUX DISEASE WITHOUT ESOPHAGITIS: ICD-10-CM

## 2021-09-30 RX ORDER — PANTOPRAZOLE SODIUM 20 MG/1
20 TABLET, DELAYED RELEASE ORAL DAILY
Qty: 90 TABLET | Refills: 0 | Status: SHIPPED | OUTPATIENT
Start: 2021-09-30 | End: 2021-11-11

## 2021-09-30 NOTE — TELEPHONE ENCOUNTER
80 yo F with osteopenia requesting refill of PPI. Patient new to me, last pantoprazole Rx sent by last PCP. Short term refill sent, will plan to discuss long term risks of PPI in light of her osteopenia. Does appear this conversation was had previously and patient was no ready to trial off at that time.    Yu Wadsworth MD

## 2021-10-25 NOTE — PROGRESS NOTES
"       HPI       Lou Winston is a 79 year old  who presents for   Chief Complaint   Patient presents with     RECHECK     TB lab     Imm/Inj     Flu Shot         Adult Day Care Form  Patient presents for completion of adult day care forms. Patient with history of latent TB self report treatment for [facility], though clinic was still awaiting records. Has had positive quant along with a negative CXR. Today patient denies any acute concerns including cough, night sweats, chills or weight loss.      An [employer]  was used for  this visit.    Problem, Medication and Allergy Lists were reviewed and updated if needed..    Patient is an established patient of this clinic..         Review of Systems:   Review of Systems   5 point ROS neg other than the symptoms noted above in the HPI.         Physical Exam:     Vitals:    10/16/19 0846   BP: 119/76   Pulse: 83   Resp: 16   Temp: 98.6  F (37  C)   TempSrc: Oral   SpO2: 100%   Weight: 64.4 kg (142 lb)   Height: 1.562 m (5' 1.5\")     Body mass index is 26.4 kg/m .  Vitals were reviewed and were normal     Physical Exam        Results:   Results from last visit:  Office Visit on 09/11/2019   Component Date Value Ref Range Status     Quantiferon-TB Gold Plus Result 09/11/2019 Positive* NEG^Negative Final    Comment: Interferon gamma response to M.tuberculosis antigens was detected,suggesting   infection with M.tuberculosis. Positive results in patients at low risk for   infection should be interpreted with caution and repeat testing on a new   sample should be considered    as recommended by the 2017 ATS/IDSA/CDC Clinical Practice Guidelines for   Diagnosis of Tuberculosis in Adults and Children [Danielle REYNOSO et al.   Clin.Infect.Dis. 2017 64(92):111-115]. False positive results may occur in   patients with prior infection with M.marinum, M.szulgai or M.kansasii.       TB1 Ag minus Nil Value 09/11/2019 5.61  IU/mL Final     TB2 Ag minus Nil Value 09/11/2019 6.39  IU/mL " Final     Mitogen minus Nil Result 09/11/2019 >10.00  IU/mL Final     Nil Result 09/11/2019 0.44  IU/mL Final     XR CHEST 2 VW  Narrative: EXAM: XR CHEST 2 VW  10/2/2019 11:02 AM     HISTORY:  TB lung, latent       COMPARISON:  None available    FINDINGS:    The trachea is midline. The cardiomediastinal silhouette is well  defined. The pulmonary vasculature are distinct.     The included osseous thorax, soft tissues and upper abdomen and are  within normal limits.     Lungs are clear without discrete nodule/mass, focal airspace  consolidative opacity, pleural effusion or pneumothorax.  Impression: IMPRESSION: No acute airspace opacity. No radiographic evidence of  tuberculosis infection.    I have personally reviewed the examination and initial interpretation  and I agree with the findings.    RAFFAELE HURD, DO    Assessment and Plan        Lou was seen today for recheck and imm/inj.    TB lung, latent  Patient presents for completion of her adult day care paperwork along with chest x ray follow up. CXR with no acute signs of active TB. Patient without any concerning symptoms of active tuberculosis. New paperwork signed for release of information for TB treatment. At this time paperwork completed along with copy of chest xray. Case discussed with Dr. Ridley and agreeable to plan.     Need for prophylactic vaccination and inoculation against influenza  Thanked patient for getting flu vaccine today.   -     Fluzone high dose, 65+ years [27840]         There are no discontinued medications.    Options for treatment and follow-up care were reviewed with the patient. Lou Winston  engaged in the decision making process and verbalized understanding of the options discussed and agreed with the final plan.    Carrie Ca MD  [facility] Resident PGY-3  [phone]    Those present during this appointment were: patient, myself and     Glycopyrrolate Counseling:  I discussed with the patient the risks of glycopyrrolate including but not limited to skin rash, drowsiness, dry mouth, difficulty urinating, and blurred vision.

## 2021-11-11 DIAGNOSIS — K21.9 GASTROESOPHAGEAL REFLUX DISEASE WITHOUT ESOPHAGITIS: ICD-10-CM

## 2021-11-11 RX ORDER — PANTOPRAZOLE SODIUM 20 MG/1
20 TABLET, DELAYED RELEASE ORAL DAILY
Qty: 30 TABLET | Refills: 0 | Status: SHIPPED | OUTPATIENT
Start: 2021-11-11 | End: 2021-11-18

## 2021-11-11 NOTE — TELEPHONE ENCOUNTER
82 yo F with osteopenia requesting refill of PPI. Patient new to me, last pantoprazole Rx sent by last PCP. Short term refill sent, will plan to discuss long term risks of PPI in light of her osteopenia. Does appear this conversation was had previously and patient was not ready to trial off at that time. She has appt scheduled with me next week. No further refills until appt.     Yu Wadsworth MD

## 2021-11-18 ENCOUNTER — OFFICE VISIT (OUTPATIENT)
Dept: FAMILY MEDICINE | Facility: CLINIC | Age: 81
End: 2021-11-18
Payer: COMMERCIAL

## 2021-11-18 VITALS
WEIGHT: 145 LBS | BODY MASS INDEX: 26.52 KG/M2 | TEMPERATURE: 97.9 F | OXYGEN SATURATION: 99 % | SYSTOLIC BLOOD PRESSURE: 125 MMHG | HEART RATE: 82 BPM | RESPIRATION RATE: 16 BRPM | DIASTOLIC BLOOD PRESSURE: 81 MMHG

## 2021-11-18 DIAGNOSIS — K21.9 GASTROESOPHAGEAL REFLUX DISEASE WITHOUT ESOPHAGITIS: ICD-10-CM

## 2021-11-18 DIAGNOSIS — Z23 NEED FOR PROPHYLACTIC VACCINATION AND INOCULATION AGAINST INFLUENZA: Primary | ICD-10-CM

## 2021-11-18 DIAGNOSIS — M54.16 CHRONIC LUMBAR RADICULOPATHY: ICD-10-CM

## 2021-11-18 DIAGNOSIS — S39.012A STRAIN OF LUMBAR REGION, INITIAL ENCOUNTER: ICD-10-CM

## 2021-11-18 PROCEDURE — 90662 IIV NO PRSV INCREASED AG IM: CPT | Performed by: STUDENT IN AN ORGANIZED HEALTH CARE EDUCATION/TRAINING PROGRAM

## 2021-11-18 PROCEDURE — 99214 OFFICE O/P EST MOD 30 MIN: CPT | Mod: 25 | Performed by: STUDENT IN AN ORGANIZED HEALTH CARE EDUCATION/TRAINING PROGRAM

## 2021-11-18 PROCEDURE — 90471 IMMUNIZATION ADMIN: CPT | Performed by: STUDENT IN AN ORGANIZED HEALTH CARE EDUCATION/TRAINING PROGRAM

## 2021-11-18 RX ORDER — PANTOPRAZOLE SODIUM 20 MG/1
20 TABLET, DELAYED RELEASE ORAL DAILY
Qty: 90 TABLET | Refills: 0 | Status: SHIPPED | OUTPATIENT
Start: 2021-11-18 | End: 2022-02-04

## 2021-11-18 RX ORDER — LIDOCAINE 4 G/G
1 PATCH TOPICAL EVERY 24 HOURS
Qty: 10 PATCH | Refills: 1 | Status: SHIPPED | OUTPATIENT
Start: 2021-11-18 | End: 2022-11-21

## 2021-11-18 RX ORDER — ACETAMINOPHEN 500 MG
1000 TABLET ORAL EVERY 8 HOURS PRN
Qty: 100 TABLET | Refills: 11 | Status: SHIPPED | OUTPATIENT
Start: 2021-11-18 | End: 2022-10-27

## 2021-11-18 NOTE — NURSING NOTE
Due to patient being non-English speaking/uses sign language, an  was used for this visit. Only for face-to-face interpretation by an external agency, date and length of interpretation can be found on the scanned worksheet.     name: Misty  Agency: [employer]  Language: Nicole   Telephone number: [phone]  Type of interpretation: Telephone, spoken

## 2021-11-18 NOTE — Clinical Note
Could you please reach out to patient and see how her chest pain is doing with the lidocaine patches? If she is still having pain please help her schedule a follow up visit with me in the next 1-2 weeks if possible. Can you also confirm she is taking tums TID as well as a vitamin D supplement? She does not need to take the Os-Raul (I've heard its huge and hard to take), but does need both calcium and vit D for her bones. Thanks! Mrevat

## 2021-11-18 NOTE — PROGRESS NOTES
Assessment & Plan     Chest pain, reproducible   Costochondritis  81 year old female presenting with 2 weeks of left side pain. Clinical signs and symptoms most consistent with costochondritis, though differential includes rib fracture, Shingles, uncontrolled GERD, among others. Review of chart does show a history of severe arthritis symptoms (primarily back pain) for which she has been prescribed tramadol in the past, not currently on this medication. Reported similar pain at visit with prior PCP Dr. Ridley 2/2021, stress test negative for inducible ischemia. Current symptoms not consistent with cardiac etiology of chest pain. Questions fibromyalgia vs trigger points at that time. Also considered uncontrolled GERD as this has been an issue for her historically and symptoms seemed to worsen after running out of pantoprazole though location and reproducibility would certainly make this less likely. Did consider XR to evaluate for rib fracture though with normal vital signs, no pleuritic pain, did not feel it would  (though would  of osteopenia as below). Recommend use of lidocaine patches and Tylenol PRN with close follow up with me in 1-2 weeks to reevaluate. If persistent pain at that time, would plan to obtain XR.   - Lidocaine (LIDOCARE) 4 % Patch; Place 1 patch onto the skin every 24 hours To prevent lidocaine toxicity, patient should be patch free for 12 hrs daily.    Osteopenia  Chronic PPI use  Vitamin D deficiency   Patient chronically prescribed pantoprazole, discussed risks especially in light of her known history of osteopenia with T-score -1.8 at the R femoral neck on DXA 9/2020. GI referral has been placed previously given uncontrolled symptoms of dyspepsia/reflux on PPI though never scheduled. H. Pylori testing negative 9/2020, was on PPI at time of testing. EGD 2019 c/w recently healed erosions. If she sustained a rib fracture (fragility, no recent falls), would  "recommend repeat DXA and consideration of treatment. She is prescribed vitamin D and calcium though med list confusing -- should be taking calcium carbonate TID, vitamin D 2,000 units, reported not taking Os-Raul which is appropriate.    DEXA  09/10/2020: T-score of -1.8 at the level of the right femoral neck  12/2013: T score -1.7 at the level of the right femoral neck  No prior osteoporosis treatment per chart review   No fractures noted on review of prior imaging    Gastroesophageal reflux disease without esophagitis  Refill:   - pantoprazole (PROTONIX) 20 MG EC tablet; Take 1 tablet (20 mg) by mouth daily TAKE ONE TABLET BY MOUTH DAILY BEFORE MEAL    Need for prophylactic vaccination and inoculation against influenza  - INFLUENZA, QUAD, HIGH DOSE, PF, 65YR + (FLUZONE HD)    Return in about 2 weeks (around 12/2/2021), or if symptoms worsen or fail to improve, for Follow up.    Yu Wadsworth MD  [facility] NANO Blake is a 81 year old with GERD, chronic lumbar radiculopathy, osteopenia who presents for the following health issues  accompanied by her  ([employer], telephone):     HPI     Chest Pain     Onset: 2 weeks     Description:   Location:  left side- under left boob  Character: sharp, achey   Radiation: left shoulder and arm   Duration: intermittent- chest pain comes and goes but arm pain has been constant x2 weeks     Progression of Symptoms:  worsening    Accompanying Signs & Symptoms:  Shortness of breath: Yes- \"not like before\" -- referring to February 2021  Sweating: No   Nausea/vomiting: No   Lightheadedness: No   Palpitations: No   Fever/Chills: No   Cough: No   Heartburn: No     History:   Family history of heart disease No   Tobacco use: No     Precipitating factors: not sure what triggers it  Worse with exercise/exertion:No   Worse with deep breaths:  No   Related to food: No     Alleviating factors: nothing    Therapies Tried and outcome: nothing "      - no recent illness, cough  - no falls - lives with granddaughter who is here with her who states no falls   - no history of kidney stones, no urinary symptoms, reviewed prior imaging no nephrolithiasis   - ran out of pantoprazole, unsure when but thinks this pain has worsened since she ran out     2/2021- visit with Dr. Ridley reported similar chest pain, recommended stress testing, which was completed 3/2021 and was negative for inducible myocardial ischemia or infarction. Initially states pain is similar to what she was experiencing at that time then states it is completely different.       Review of Systems    ROS: 10 point ROS neg other than the symptoms noted above in the HPI.       Objective    /81   Pulse 82   Temp 97.9  F (36.6  C) (Oral)   Resp 16   Wt 65.8 kg (145 lb)   SpO2 99%   BMI 26.52 kg/m    Body mass index is 26.52 kg/m .  Physical Exam  Constitutional:       General: She is not in acute distress.     Appearance: She is well-developed. She is not diaphoretic.   Cardiovascular:      Rate and Rhythm: Normal rate.   Pulmonary:      Effort: Pulmonary effort is normal. No respiratory distress.   Musculoskeletal:         General: No swelling or deformity.      Comments: Reproducible pain over the left lower rib    Skin:     General: Skin is warm.      Findings: No bruising, erythema, lesion or rash.   Neurological:      General: No focal deficit present.      Mental Status: She is alert.         No results found for any visits on 11/18/21.

## 2021-11-18 NOTE — PATIENT INSTRUCTIONS
Doorway stretch: Stand facing an open doorway. Raise your arms to the sides and bend your elbows to a 90 degree angle. Rest your forearm against the wall and with your elbow at shoulder height, and lean forward through the open doorway to stretch your chest muscles. Hold the stretch for 30 to 60 seconds before relaxing. Repeat 10 times 2-3 times per day.      Patient Education     Chest Wall Pain: Costochondritis    The chest pain that you have had today is caused by costochondritis. This condition is caused by an inflammation of the cartilage joining your ribs to your breastbone. It's not caused by heart or lung problems. Your healthcare team has made sure that the chest pain you feel is not from a life threatening cause of chest pain such as heart attack, collapsed lung, blood clot in the lung, tear in the aorta, or esophageal rupture. The inflammation may have been brought on by a blow to the chest, lifting heavy objects, intense exercise, or an illness that made you cough and sneeze a lot. It often occurs during times of emotional stress. It can be painful, but it's not dangerous. It usually goes away in 1 to 2 weeks. But it may happen again. Rarely, a more serious condition may cause symptoms similar to costochondritis. That s why it s important to watch for the warning signs listed below.   Home care  Follow these guidelines when caring for yourself at home:    If you feel that emotional stress is a cause of your condition, try to figure out the sources of that stress. It may not be obvious. Learn ways to deal with the stress in your life. This can include regular exercise, muscle relaxation, meditation, or simply taking time out for yourself.    You may use acetaminophen, ibuprofen, or naproxen to control pain, unless another pain medicine was prescribed. If you have liver or kidney disease or ever had a stomach ulcer, talk with your healthcare provider before using these medicines.    You can also help ease  pain by using a hot, wet compress or heating pad. Use this with or without a medicated skin cream that helps relieves pain.    Do stretching exercise as advised by your provider. Typically rest is beneficial for the first few days. Avoid strenuous activity that worsens the pain.    Take any prescribed medicines as directed.  Follow-up care  Follow up with your healthcare provider, or as advised.   When to seek medical advice  Call your healthcare provider right away if any of these occur:    A change in the type of pain. Call if it feels different, becomes more serious, lasts longer, or spreads into your shoulder, arm, neck, jaw, or back.    Shortness of breath or pain gets worse when you breathe    Weakness, dizziness, or fainting    Cough with dark-colored sputum (phlegm) or blood    Abdominal pain    Dark red or black stools    Fever of 100.4 F (38 C) or higher, or as directed by your healthcare provider  Beth last reviewed this educational content on 6/1/2019 2000-2021 [employer]. All rights reserved. This information is not intended as a substitute for professional medical care. Always follow your healthcare professional's instructions.

## 2021-12-20 DIAGNOSIS — K21.9 GASTROESOPHAGEAL REFLUX DISEASE WITHOUT ESOPHAGITIS: ICD-10-CM

## 2021-12-20 RX ORDER — CALCIUM CARBONATE 500 MG/1
TABLET, CHEWABLE ORAL
Qty: 90 TABLET | Refills: 3 | Status: SHIPPED | OUTPATIENT
Start: 2021-12-20 | End: 2022-10-27

## 2021-12-20 RX ORDER — PANTOPRAZOLE SODIUM 20 MG/1
20 TABLET, DELAYED RELEASE ORAL DAILY
Qty: 90 TABLET | Refills: 0 | OUTPATIENT
Start: 2021-12-20

## 2021-12-20 NOTE — TELEPHONE ENCOUNTER
Pantoprazole Rx declined. Last pantoprazole sent 11/18/2021 #90 tablets, should be 3 month supply.     Yu Wadsworth MD

## 2022-02-02 ENCOUNTER — DOCUMENTATION ONLY (OUTPATIENT)
Dept: FAMILY MEDICINE | Facility: CLINIC | Age: 82
End: 2022-02-02
Payer: COMMERCIAL

## 2022-02-02 NOTE — PROGRESS NOTES
Telephone call to the client's home for annual health risk assessment and PCA assessment.  An  was not needed.    Current situation/living environment  Lives in [facility] in [facility] with granddtr and other family assisting as needed.    Activities of daily living (ADL)/instrumental activities of daily living (IADL) and functional issues  Client needs help with the following ADL's: dressing, grooming and bathing  Client needs help with the following IADL's: shopping, cooking, housekeeping, laundry, managing finances/bills and transportation  Client states she is unable to perform the above due to N/A      Health concerns for today  No health concerns expressed today.  Has patient fallen 2 or more times in the last year? No  Has patient fallen with injury in the last year? No    Other  Importance of safe proper disposal of controlled substances discussed with client and resources for med disposal sites provided.    Cognition/mental health  Alert and oriented during assessment.  Has some memory loss/forgetfulness per documentation but not noted during assessment.       STARS/Med Adherence  Client is non-compliant with the following quality measures: N/A  Comments:  Over age 77    Client's Plan of Care consists of:  Adult day care (5 days per week), Homemaker services (5 hours per week) and Personal care assistance (PCA) (1.25 hours per day)   unable to determine

## 2022-02-04 ENCOUNTER — OFFICE VISIT (OUTPATIENT)
Dept: FAMILY MEDICINE | Facility: CLINIC | Age: 82
End: 2022-02-04
Payer: COMMERCIAL

## 2022-02-04 VITALS
OXYGEN SATURATION: 96 % | RESPIRATION RATE: 16 BRPM | HEART RATE: 94 BPM | TEMPERATURE: 97.5 F | DIASTOLIC BLOOD PRESSURE: 67 MMHG | SYSTOLIC BLOOD PRESSURE: 124 MMHG

## 2022-02-04 DIAGNOSIS — G89.29 CHRONIC BILATERAL LOW BACK PAIN WITHOUT SCIATICA: Primary | ICD-10-CM

## 2022-02-04 DIAGNOSIS — G89.4 CHRONIC PAIN SYNDROME: ICD-10-CM

## 2022-02-04 DIAGNOSIS — K21.9 GASTROESOPHAGEAL REFLUX DISEASE WITHOUT ESOPHAGITIS: ICD-10-CM

## 2022-02-04 DIAGNOSIS — M54.50 CHRONIC BILATERAL LOW BACK PAIN WITHOUT SCIATICA: Primary | ICD-10-CM

## 2022-02-04 DIAGNOSIS — R73.03 PREDIABETES: ICD-10-CM

## 2022-02-04 DIAGNOSIS — M85.851 OSTEOPENIA OF NECK OF RIGHT FEMUR: ICD-10-CM

## 2022-02-04 DIAGNOSIS — F33.1 MAJOR DEPRESSIVE DISORDER, RECURRENT EPISODE, MODERATE (H): ICD-10-CM

## 2022-02-04 PROCEDURE — 99214 OFFICE O/P EST MOD 30 MIN: CPT | Mod: GC | Performed by: STUDENT IN AN ORGANIZED HEALTH CARE EDUCATION/TRAINING PROGRAM

## 2022-02-04 RX ORDER — PANTOPRAZOLE SODIUM 20 MG/1
20 TABLET, DELAYED RELEASE ORAL DAILY
Qty: 90 TABLET | Refills: 1 | Status: SHIPPED | OUTPATIENT
Start: 2022-02-04 | End: 2022-02-18

## 2022-02-04 RX ORDER — MULTIPLE VITAMINS W/ MINERALS TAB 9MG-400MCG
1 TAB ORAL DAILY
Qty: 90 TABLET | Refills: 3 | Status: SHIPPED | OUTPATIENT
Start: 2022-02-04 | End: 2023-03-17

## 2022-02-04 NOTE — PROGRESS NOTES
Assessment & Plan     Chronic bilateral low back pain without sciatica  Chronic pain syndrome  Osteopenia  Major depressive disorder, recurrent episode, moderate (H)  82-year-old female with history of chronic pain syndrome, severe arthritis symptoms, presenting with worsening of her chronic lower back pain.  She is already on max dose of duloxetine 60 mg twice daily, prior flares have occurred when she ran out of this medication which she denies is the case this time.  States cold weather is often a trigger for her as well which she believes is why her pain is currently flaring.  Reviewed prior MR imaging showing degenerative disc disease and mild foraminal stenosis of the lumbar spine, consistent with her symptoms.  She continues to decline physical therapy recommendation due to concerns about COVID-19 exposure.  I did show her exercises for her low back pain that she can do at home and also encouraged her to schedule a visit for OMT evaluation as she is comfortable coming to [facility].  She does also go to adult  4 days a week and does exercises there, though unclear what those are.  No red flag symptoms, normal neurologic exam, no bony tenderness and no falls/trauma so imaging not pursued today.  She does have low bone density noted on DEXA from 9/2020 so low threshold to obtain XR if not improving.  -Schedule appointment for OMT evaluation   -Home exercises  -Heat    Prediabetes  - Hemoglobin A1c; Future    Refill:   - multivitamin w/minerals (THERA-VIT-M) tablet; Take 1 tablet by mouth daily    No follow-ups on file.    Yu Wadsworth MD  [facility] NANO Blake is a 82 year old with chronic pain syndrome, depression, anxiety who presents for the following health issues accompanied by her .    HPI   Patient presents with:  Back Pain: bodyaches all over have became worse per pt. Lower back pain worse x;s 1 wk. On going issue.Hermes,PT, massage  therapy and lidocaine not helping.   Refill Request: multivitamin   RECHECK: inquring if she needs to check her a1c again as her last one in September was elevated    All pain flare, seems to be related to cold weather which is often a trigger for her pain  Starts neck, goes all the way down back and arms   No numbness or tingling   Lidocaine patches only provide short term relief   Still reluctant to try physical therapy   Goes to [facility] Care 4 days/week and does exercise there   Does occasionally notice finger joint pain - unclear if they are ever red, hot or swollen  ESR have been normal   No recent falls or injuries     A1c check  - September A1c 6.1    Review of Systems    ROS: 10 point ROS neg other than the symptoms noted above in the HPI.       Objective    /67   Pulse 94   Temp 97.5  F (36.4  C) (Temporal)   Resp 16   SpO2 96%   There is no height or weight on file to calculate BMI.  Physical Exam  Constitutional:       General: She is not in acute distress.     Appearance: She is well-developed. She is not diaphoretic.   Cardiovascular:      Rate and Rhythm: Normal rate.   Pulmonary:      Effort: Pulmonary effort is normal. No respiratory distress.   Musculoskeletal:         General: No swelling or deformity.      Comments: No active synovitis. Back pain worse with bending flexion, no better or worse with extension    Skin:     Comments: Splintering of all finger nails    Neurological:      General: No focal deficit present.      Mental Status: She is alert.        No results found for any visits on 02/04/22.

## 2022-02-04 NOTE — PATIENT INSTRUCTIONS
Schedule a visit for OMT evaluation to see if this could be an option for you - done here at [facility]

## 2022-02-04 NOTE — NURSING NOTE
Due to patient being non-English speaking/uses sign language, an  was used for this visit. Only for face-to-face interpretation by an external agency, date and length of interpretation can be found on the scanned worksheet.     name: ID#[id number]  Agency: [employer]  Language: Nicole   Telephone number: [phone]  Type of interpretation: Telephone, spoken

## 2022-02-17 NOTE — PROGRESS NOTES
FLOWER Blake is a 82 year old female who presents today for   Chief Complaint   Patient presents with     RECHECK     OMT     Daughter interpreted the visit    Pain located in low back, shoulders bilateral.  Years of back pain  Shoulders are new  No numbness/tingling in arms/legs  No falls/injuries  No groin numbness, loss of bowel/bladder, fever/chills, weight loss      Patient Active Problem List   Diagnosis     Health Care Home     Anxiety disorder     Chronic post-traumatic stress disorder     Chronic tension type headache     Cognitive disorder     Herniated disc     Limb pain     Lower back pain     Lumbago     Lumbar radiculopathy     Vitamin D deficiency     Major depressive disorder, recurrent episode, moderate (H)     H. pylori infection     Osteopenia     Subclinical hyperthyroidism     Gastroesophageal reflux disease without esophagitis     Prediabetes     Chronic pain syndrome     Chronic lumbar radiculopathy     Advance care planning     Dyspepsia       Current Outpatient Medications   Medication Sig Dispense Refill     acetaminophen (TYLENOL) 500 MG tablet Take 2 tablets (1,000 mg) by mouth every 8 hours as needed for mild pain 100 tablet 11     Calcium Carb-Cholecalciferol (OYSTER SHELL CALCIUM/D) 500-5 MG-MCG TABS        carboxymethylcellulose (REFRESH PLUS) 0.5 % SOLN ophthalmic solution Place 1 drop into both eyes 3 times daily as needed for dry eyes 30 mL 3     CARBOXYMETHYLCELLULOSE SODIUM 0.5 % ophthalmic solution        diclofenac (VOLTAREN) 1 % topical gel Apply 4 g topically 4 times daily as needed for moderate pain (back pain) 150 g 1     DULoxetine (CYMBALTA) 60 MG capsule Take 1 capsule (60 mg) by mouth 2 times daily 60 capsule 11     Lidocaine (LIDOCARE) 4 % Patch Place 1 patch onto the skin every 24 hours To prevent lidocaine toxicity, patient should be patch free for 12 hrs daily. 10 patch 1     multivitamin w/minerals (THERA-VIT-M) tablet Take 1 tablet by mouth daily 90 tablet  3     pantoprazole (PROTONIX) 20 MG EC tablet Take 1 tablet (20 mg) by mouth daily TAKE ONE TABLET BY MOUTH DAILY BEFORE MEAL 90 tablet 1     aspirin (ASA) 81 MG EC tablet Take 1 tablet (81 mg) by mouth daily (Patient not taking: Reported on 2/4/2022) 90 tablet 3     CALCIUM ANTACID 500 MG chewable tablet CHEW AND SWALLOW ONE TABLET BY MOUTH THREE TIMES A DAY (Patient not taking: Reported on 2/4/2022) 90 tablet 3     calcium carbonate-vitamin D (OS-MONI) 500-400 MG-UNIT tablet Take 1 tablet by mouth 2 times daily (Patient not taking: Reported on 8/12/2021) 60 tablet 11       No Known Allergies    Social History     Socioeconomic History     Marital status:      Spouse name: Not on file     Number of children: Not on file     Years of education: Not on file     Highest education level: Not on file   Occupational History     Not on file   Tobacco Use     Smoking status: Never Smoker     Smokeless tobacco: Never Used   Vaping Use     Vaping Use: Never used   Substance and Sexual Activity     Alcohol use: No     Drug use: No     Sexual activity: Not Currently   Other Topics Concern     Parent/sibling w/ CABG, MI or angioplasty before 65F 55M? Not Asked   Social History Narrative     Not on file     Social Determinants of Health     Financial Resource Strain: Not on file   Food Insecurity: Not on file   Transportation Needs: Not on file   Physical Activity: Not on file   Stress: Not on file   Social Connections: Not on file   Intimate Partner Violence: Not on file   Housing Stability: Not on file            Review of Systems:       Review of Systems   Constitutional: Negative for chills, fever and weight loss.   Musculoskeletal: Positive for back pain and neck pain. Negative for falls and joint pain.   Skin: Negative for rash.   Neurological: Negative for dizziness, tingling, sensory change, focal weakness, weakness and headaches.               Physical Exam:     Vitals:    02/18/22 1413 02/18/22 1414   BP: (!)  148/80 129/80   Pulse: 84    Resp: 16    Temp: 98.7  F (37.1  C)    TempSrc: Oral    SpO2: 98%    Weight: 64.4 kg (142 lb)        Physical Exam  Constitutional:       General: She is not in acute distress.     Appearance: Normal appearance.   Neck:      Comments: Tenderness over SCM bilaterally  Pulmonary:      Effort: Pulmonary effort is normal.   Musculoskeletal:      Cervical back: Normal range of motion. No rigidity or tenderness.      Comments: Tenderness over trapezius L>R, lower lumbar paraspinal muscles L>R   Lymphadenopathy:      Cervical: No cervical adenopathy.   Neurological:      Mental Status: She is alert.       OMT Procedure Note:         Body Region: Head, C-spine  Dysfunction: OA dysfunction, tenderness of cervical paraspinal muscles and SCM  Treatment: suboccipital release, soft tissue, stretching  Response: improved pain    Body Region: T spine  Dysfunction: tenderness and tightness over thoracic paraspinal muscles bilaterally  Treatment: soft tissue  Response: improved pain    Body Region: Upper extremities  Dysfunction: tight and painful trapezius L>R  Treatment: pressure point release, muscle energy  Response: improved pain    Body Region: L-spine  Dysfunction: tenderness over L>R lumbar paraspinal muscles  Treatment: hip rocking, soft tissue  Response: improved      Assessment and Plan     Lou was seen today for recheck.    Diagnoses and all orders for this visit:    Prediabetes  A1C slightly increased at 6.4%. recommend follow up visit to discuss prediabetes in depth. Consider metformin if pt is interested.  -     Hemoglobin A1c    Chronic bilateral low back pain without sciatica  -     OSTEOPATHIC MANIP,5-6 BODY REGN    Gastroesophageal reflux disease without esophagitis  -     pantoprazole (PROTONIX) 20 MG EC tablet; Take 1 tablet (20 mg) by mouth daily TAKE ONE TABLET BY MOUTH DAILY BEFORE MEAL    Somatic dysfunction of cervical region  -     OSTEOPATHIC MANIP,5-6 BODY REGN    Somatic  dysfunction of head region  -     OSTEOPATHIC MANIP,5-6 BODY REGN    Somatic dysfunction of thoracic region  -     OSTEOPATHIC MANIP,5-6 BODY REGN    Somatic dysfunction of lumbar region  -     OSTEOPATHIC MANIP,5-6 BODY REGN    Somatic dysfunction of upper extremity  -     OSTEOPATHIC MANIP,5-6 BODY REGN    Chronic upper back pain  -     OSTEOPATHIC MANIP,5-6 BODY REGN      After careful consideration that this was musculoskeletal in nature, and given that this has been interfering with the patient's quality of life and ADLs, after discussion and informed consent, we have together decided to address this concern with Osteopathic Manipulative Treatment.    The patient actively participated in OMT and was able to communicate both positive and negative feedback throughout. OMT completed without incident. Patient tolerated treatment well. Patient reported that ROM, function, and/or pain level were improved. Advised that pain is occasionally worse during the first 24 hours after treatment and that drinking more water and taking Tylenol or Ibuprofen often help. Patient to return in 2-4 week/s or as needed for repeat OMT.     With a combination of soft tissue, muscle energy, stretching and pressure point release the head, c-spine, t-spine, l-spine and upper extremities were treated.  The patient tolerated the treatment well and had moderate relief of symptoms--including decreased pain and spasm, increased ROM.    Next OMT visit-would recommend muscle energy targeting pelvis, lower extremities (adductors/abductors). Pt would benefit from pool therapy, acupuncture, or massage therapy as well.    Billing:  -ordered test (A1C)  -reviewed current and past A1C values  -2+ chronic conditions w/exacerbation    Hermelindo Greene, DO   PGY-3  [facility]

## 2022-02-18 ENCOUNTER — OFFICE VISIT (OUTPATIENT)
Dept: FAMILY MEDICINE | Facility: CLINIC | Age: 82
End: 2022-02-18
Payer: COMMERCIAL

## 2022-02-18 VITALS
BODY MASS INDEX: 25.97 KG/M2 | OXYGEN SATURATION: 98 % | HEART RATE: 84 BPM | SYSTOLIC BLOOD PRESSURE: 129 MMHG | DIASTOLIC BLOOD PRESSURE: 80 MMHG | TEMPERATURE: 98.7 F | WEIGHT: 142 LBS | RESPIRATION RATE: 16 BRPM

## 2022-02-18 DIAGNOSIS — K21.9 GASTROESOPHAGEAL REFLUX DISEASE WITHOUT ESOPHAGITIS: ICD-10-CM

## 2022-02-18 DIAGNOSIS — R73.03 PREDIABETES: ICD-10-CM

## 2022-02-18 DIAGNOSIS — M99.00 SOMATIC DYSFUNCTION OF HEAD REGION: ICD-10-CM

## 2022-02-18 DIAGNOSIS — M99.03 SOMATIC DYSFUNCTION OF LUMBAR REGION: ICD-10-CM

## 2022-02-18 DIAGNOSIS — M54.9 CHRONIC UPPER BACK PAIN: ICD-10-CM

## 2022-02-18 DIAGNOSIS — M54.50 CHRONIC BILATERAL LOW BACK PAIN WITHOUT SCIATICA: Primary | ICD-10-CM

## 2022-02-18 DIAGNOSIS — M99.01 SOMATIC DYSFUNCTION OF CERVICAL REGION: ICD-10-CM

## 2022-02-18 DIAGNOSIS — M99.02 SOMATIC DYSFUNCTION OF THORACIC REGION: ICD-10-CM

## 2022-02-18 DIAGNOSIS — M99.07 SOMATIC DYSFUNCTION OF UPPER EXTREMITY: ICD-10-CM

## 2022-02-18 DIAGNOSIS — G89.29 CHRONIC BILATERAL LOW BACK PAIN WITHOUT SCIATICA: Primary | ICD-10-CM

## 2022-02-18 DIAGNOSIS — G89.29 CHRONIC UPPER BACK PAIN: ICD-10-CM

## 2022-02-18 LAB — HBA1C MFR BLD: 6.4 % (ref 0–5.6)

## 2022-02-18 PROCEDURE — 36415 COLL VENOUS BLD VENIPUNCTURE: CPT | Performed by: STUDENT IN AN ORGANIZED HEALTH CARE EDUCATION/TRAINING PROGRAM

## 2022-02-18 PROCEDURE — 99214 OFFICE O/P EST MOD 30 MIN: CPT | Mod: 25 | Performed by: STUDENT IN AN ORGANIZED HEALTH CARE EDUCATION/TRAINING PROGRAM

## 2022-02-18 PROCEDURE — 98927 OSTEOPATH MANJ 5-6 REGIONS: CPT | Mod: GC | Performed by: STUDENT IN AN ORGANIZED HEALTH CARE EDUCATION/TRAINING PROGRAM

## 2022-02-18 PROCEDURE — 83036 HEMOGLOBIN GLYCOSYLATED A1C: CPT | Performed by: STUDENT IN AN ORGANIZED HEALTH CARE EDUCATION/TRAINING PROGRAM

## 2022-02-18 RX ORDER — PANTOPRAZOLE SODIUM 20 MG/1
20 TABLET, DELAYED RELEASE ORAL DAILY
Qty: 90 TABLET | Refills: 1 | Status: SHIPPED | OUTPATIENT
Start: 2022-02-18 | End: 2022-10-03

## 2022-02-18 ASSESSMENT — ENCOUNTER SYMPTOMS
BACK PAIN: 1
NECK PAIN: 1
CHILLS: 0
SENSORY CHANGE: 0
DIZZINESS: 0
HEADACHES: 0
WEIGHT LOSS: 0
FALLS: 0
WEAKNESS: 0
TINGLING: 0
FEVER: 0
FOCAL WEAKNESS: 0

## 2022-06-06 DIAGNOSIS — F33.1 MAJOR DEPRESSIVE DISORDER, RECURRENT EPISODE, MODERATE (H): ICD-10-CM

## 2022-06-06 DIAGNOSIS — M54.16 CHRONIC LUMBAR RADICULOPATHY: ICD-10-CM

## 2022-06-06 RX ORDER — DULOXETIN HYDROCHLORIDE 60 MG/1
60 CAPSULE, DELAYED RELEASE ORAL 2 TIMES DAILY
Qty: 180 CAPSULE | Refills: 1 | Status: SHIPPED | OUTPATIENT
Start: 2022-06-06 | End: 2022-10-27

## 2022-06-07 ENCOUNTER — OFFICE VISIT (OUTPATIENT)
Dept: FAMILY MEDICINE | Facility: CLINIC | Age: 82
End: 2022-06-07
Payer: COMMERCIAL

## 2022-06-07 VITALS
OXYGEN SATURATION: 98 % | HEART RATE: 81 BPM | DIASTOLIC BLOOD PRESSURE: 69 MMHG | BODY MASS INDEX: 26.96 KG/M2 | HEIGHT: 61 IN | WEIGHT: 142.8 LBS | SYSTOLIC BLOOD PRESSURE: 116 MMHG | TEMPERATURE: 98.5 F | RESPIRATION RATE: 16 BRPM

## 2022-06-07 DIAGNOSIS — M25.512 CHRONIC LEFT SHOULDER PAIN: Primary | ICD-10-CM

## 2022-06-07 DIAGNOSIS — G89.4 CHRONIC PAIN SYNDROME: ICD-10-CM

## 2022-06-07 DIAGNOSIS — M54.2 NECK PAIN: ICD-10-CM

## 2022-06-07 DIAGNOSIS — G89.29 CHRONIC LEFT SHOULDER PAIN: Primary | ICD-10-CM

## 2022-06-07 DIAGNOSIS — M85.851 OSTEOPENIA OF NECK OF RIGHT FEMUR: ICD-10-CM

## 2022-06-07 PROCEDURE — 99214 OFFICE O/P EST MOD 30 MIN: CPT | Mod: GC | Performed by: STUDENT IN AN ORGANIZED HEALTH CARE EDUCATION/TRAINING PROGRAM

## 2022-06-07 NOTE — PATIENT INSTRUCTIONS
Schedule OMT follow up with focus on neck/shoulder    Start taking duloxetine medicine 2 times per day    Return if not improving and we will plan to obtain imaging

## 2022-06-07 NOTE — PROGRESS NOTES
Assessment & Plan     Chronic left shoulder pain  Neck pain  Chronic pain syndrome  Osteopenia of neck of right femur  82-year-old female with history of chronic pain syndrome, severe arthritis symptoms, presenting with newer onset left neck and shoulder pain. No radiculopathy though pain distribution does raise suspicion for a cervical spine etiology. She has been prescribed duloxetine in the past, though has only been taking once daily -- recommend increasing to 60 mg BID which is max dosing. Recommend this and a trial of OMT targeting her neck/shoulder/arm and if not improving, would pursue cervical spine imaging. OMT was notably helpful for her back pain in the past. She does have a history of osteopenia on DEXA from 9/2020. Recommend cervical spine films next visit  - Increase duloxetine from 60 mg daily to BID  - Schedule OMT visit with focus on left trapezius/neck/arm  - Return within 1 month if not improving, anticipate imaging if this is the case     Healthcare maintenance  - Declined COVID19 vaccine booster     Return in about 4 weeks (around 7/5/2022) for Follow up pain.    Yu Wadsworth MD  [facility] NANO Blake is a 82 year old with history of chronic pain syndrome, severe arthritis who presents for the following health issues  accompanied by her granddaughter who did all interpreting. [employer]  was on the phone but did not provide any help with interpretation.     HPI     OMT last 2/18 focus on back, was helpful   Pain located in low back, shoulders bilateral.  Years of back pain  Shoulders are new- Tenderness over trapezius L>R, lower lumbar paraspinal muscles L>R   Upper back, chest, shoulders   When the pain comes it feels numb in neck   When it hurts its difficult to grab things with her hands   Constant pain, worsens with cool breeze, drinking cool water   Duloxetine prescribed in the past, states she is unsure if she is taking this  "medication, thinks she is -- showed picture and is taking but only once per day   R handed, R shoulder>left         Objective    /69   Pulse 81   Temp 98.5  F (36.9  C) (Oral)   Resp 16   Ht 1.56 m (5' 1.42\")   Wt 64.8 kg (142 lb 12.8 oz)   SpO2 98%   BMI 26.62 kg/m    Body mass index is 26.62 kg/m .  Physical Exam  Constitutional:       General: She is not in acute distress.     Appearance: She is well-developed. She is not diaphoretic.   Cardiovascular:      Rate and Rhythm: Normal rate.   Pulmonary:      Effort: Pulmonary effort is normal. No respiratory distress.   Musculoskeletal:         General: Tenderness (L trap > R) present. No swelling or deformity.   Skin:     General: Skin is warm.      Coloration: Skin is not jaundiced.      Findings: No erythema.   Neurological:      General: No focal deficit present.      Mental Status: She is alert.      Motor: No weakness.      Gait: Gait normal.        No results found for this or any previous visit (from the past 24 hour(s)).          "

## 2022-06-07 NOTE — NURSING NOTE
Due to patient being non-English speaking/uses sign language, an  was used for this visit. Only for face-to-face interpretation by an external agency, date and length of interpretation can be found on the scanned worksheet.     name: Misty  Agency: [employer]  Language: Nicole   Telephone number: [phone]  Type of interpretation: Telephone, spoken    Mirela Nieto CMA

## 2022-06-14 NOTE — PROGRESS NOTES
FLOWER Blake is a 82 year old female who presents today for   Chief Complaint   Patient presents with     RECHECK     OMT for back pain and bilateral shoulder pain.     An LogoGrab  was used for  this visit.     Chronic L shoulder pain  Neck pain  Osteopenia   -on duloxetine  -last had OMT for back 2/18 which was helpful    Neck pain  L trapezius pain  -L side   -a few months  -no injury   -numbness/tingling down L arm, feels tight  -hard to grab things in L hand,  strength weakness  -R handed  -headaches      Patient Active Problem List   Diagnosis     Health Care Home     Anxiety disorder     Chronic post-traumatic stress disorder     Chronic tension type headache     Cognitive disorder     Herniated disc     Limb pain     Lower back pain     Chronic bilateral low back pain without sciatica     Lumbar radiculopathy     Vitamin D deficiency     Major depressive disorder, recurrent episode, moderate (H)     H. pylori infection     Osteopenia     Subclinical hyperthyroidism     Gastroesophageal reflux disease without esophagitis     Prediabetes     Chronic pain syndrome     Chronic lumbar radiculopathy     Advance care planning     Dyspepsia       Current Outpatient Medications   Medication Sig Dispense Refill     acetaminophen (TYLENOL) 500 MG tablet Take 2 tablets (1,000 mg) by mouth every 8 hours as needed for mild pain 100 tablet 11     Calcium Carb-Cholecalciferol (OYSTER SHELL CALCIUM/D) 500-5 MG-MCG TABS        carboxymethylcellulose (REFRESH PLUS) 0.5 % SOLN ophthalmic solution Place 1 drop into both eyes 3 times daily as needed for dry eyes 30 mL 3     CARBOXYMETHYLCELLULOSE SODIUM 0.5 % ophthalmic solution        diclofenac (VOLTAREN) 1 % topical gel Use 4x/day on neck. 100 g 3     multivitamin w/minerals (THERA-VIT-M) tablet Take 1 tablet by mouth daily 90 tablet 3     pantoprazole (PROTONIX) 20 MG EC tablet Take 1 tablet (20 mg) by mouth daily TAKE ONE TABLET BY MOUTH DAILY BEFORE MEAL  90 tablet 1     CALCIUM ANTACID 500 MG chewable tablet CHEW AND SWALLOW ONE TABLET BY MOUTH THREE TIMES A DAY (Patient not taking: No sig reported) 90 tablet 3     calcium carbonate-vitamin D (OS-MONI) 500-400 MG-UNIT tablet Take 1 tablet by mouth 2 times daily (Patient not taking: No sig reported) 60 tablet 11     diclofenac (VOLTAREN) 1 % topical gel Apply 4 g topically 4 times daily as needed for moderate pain (back pain) (Patient not taking: No sig reported) 150 g 1     DULoxetine (CYMBALTA) 60 MG capsule Take 1 capsule (60 mg) by mouth 2 times daily (Patient not taking: No sig reported) 180 capsule 1     Lidocaine (LIDOCARE) 4 % Patch Place 1 patch onto the skin every 24 hours To prevent lidocaine toxicity, patient should be patch free for 12 hrs daily. (Patient not taking: No sig reported) 10 patch 1       No Known Allergies    Social History     Socioeconomic History     Marital status:      Spouse name: Not on file     Number of children: Not on file     Years of education: Not on file     Highest education level: Not on file   Occupational History     Not on file   Tobacco Use     Smoking status: Never Smoker     Smokeless tobacco: Never Used   Vaping Use     Vaping Use: Never used   Substance and Sexual Activity     Alcohol use: No     Drug use: No     Sexual activity: Not Currently   Other Topics Concern     Parent/sibling w/ CABG, MI or angioplasty before 65F 55M? Not Asked   Social History Narrative     Not on file     Social Determinants of Health     Financial Resource Strain: Not on file   Food Insecurity: Not on file   Transportation Needs: Not on file   Physical Activity: Not on file   Stress: Not on file   Social Connections: Not on file   Intimate Partner Violence: Not on file   Housing Stability: Not on file            Review of Systems:       ROS   ROS: 10 point ROS neg other than the symptoms noted above in the HPI.              Physical Exam:     Vitals:    06/17/22 1441   BP: 121/75  "  Pulse: 76   Resp: 16   Temp: 98.4  F (36.9  C)   TempSrc: Oral   SpO2: 97%   Weight: 64.5 kg (142 lb 3.2 oz)   Height: 1.56 m (5' 1.42\")       Physical Exam  Constitutional:       General: She is not in acute distress.     Appearance: She is not ill-appearing.   HENT:      Head: Normocephalic and atraumatic.   Eyes:      Extraocular Movements: Extraocular movements intact.      Conjunctiva/sclera: Conjunctivae normal.      Pupils: Pupils are equal, round, and reactive to light.   Pulmonary:      Effort: Pulmonary effort is normal.   Abdominal:      General: Abdomen is flat.   Musculoskeletal:      Cervical back: Normal range of motion and neck supple. No rigidity.      Comments: Able to ambulate to OMT without difficulty   Lymphadenopathy:      Cervical: No cervical adenopathy.   Neurological:      General: No focal deficit present.      Mental Status: She is alert and oriented to person, place, and time. Mental status is at baseline.      Cranial Nerves: No cranial nerve deficit.      Motor: No weakness.      Gait: Gait normal.       Head: OA rotated right  Neck: paraspinal tenderness b/l but L>R; tender point over C5 on left  Trapezius: tenderness, tightness of L trapezius muscle with decreased shoulder motion on L  Scapula: decreased ROM diffusely in L shoulder  Ribs: decreased motion of anterior ribs 6-10      OMT Procedure Note:      Body Region: Head  Dysfunction: OA rotated right, suboccipital muscle tightness/pain  Treatment: suboccipital release  Response: improved tension    Body Region: C spine  Dysfunction: paraspinal tenderness b/l but L>R; tender point over C5 on left  Treatment: counterstrain, soft tissue  Response: improved tenderness and tightness    Body Region: T and L spine  Dysfunction: paraspinal tenderness diffusely L>R  Treatment: soft tissue  Response: improved    Body Region: upper extremity/trapezius, scapula  Dysfunction: L trapezius tightness, pain, and restricted L shoulder motion; " decreased ROM diffusely in L shoulder  Treatment: pressure point release, trapezius stretching, muscle energy, scapular release  Response: improved pain and ROM    Body Region: ribs  Dysfunction: decreased motion of anterior ribs 6-10  Treatment: doming of the diaphragm  Response: improved ROM of anterior ribs    Assessment and Plan     Lou was seen today for recheck.    Diagnoses and all orders for this visit:    Chronic left shoulder pain  -     X-ray lt Shoulder G/E 3 vw; Future  -     OSTEOPATHIC MANIP,5-6 BODY REGN    Neck pain  -     X-ray Cervical spine 2-3 vws; Future  -     diclofenac (VOLTAREN) 1 % topical gel; Use 4x/day on neck.  -     OSTEOPATHIC MANIP,5-6 BODY REGN    Somatic dysfunction of head region  -     OSTEOPATHIC MANIP,5-6 BODY REGN    Somatic dysfunction of cervical region  -     OSTEOPATHIC MANIP,5-6 BODY REGN    Somatic dysfunction of thoracic region  -     OSTEOPATHIC MANIP,5-6 BODY REGN    Somatic dysfunction of lumbar region  -     OSTEOPATHIC MANIP,5-6 BODY REGN    Somatic dysfunction of upper extremity  -     OSTEOPATHIC MANIP,5-6 BODY REGN    Somatic dysfunction of rib cage region  -     OSTEOPATHIC MANIP,5-6 BODY REGN      Appropriate for OMT. Tolerated gentle techniques well with improvement in neck, trapezius and upper back pain on the left. Will also get XR of c-spine and L shoulder due to numbness/tingling and weakness with  strength. Advised to use tylenol PRN. Will start voltaren gel for neck pain. Recommend pool for exercise this summer too.    OMT completed without incident. Patient tolerated treatment well. Advised that pain is occasionally worse during the first 24 hours after treatment. Patient to return in 1-2 or as needed for repeat OMT.     COVID booster declined.    Hermelindo Greene, DO   PGY-3  [facility] Medicine  Pager [phone]

## 2022-06-17 ENCOUNTER — ANCILLARY PROCEDURE (OUTPATIENT)
Dept: GENERAL RADIOLOGY | Facility: CLINIC | Age: 82
End: 2022-06-17
Attending: FAMILY MEDICINE
Payer: COMMERCIAL

## 2022-06-17 ENCOUNTER — OFFICE VISIT (OUTPATIENT)
Dept: FAMILY MEDICINE | Facility: CLINIC | Age: 82
End: 2022-06-17

## 2022-06-17 VITALS
TEMPERATURE: 98.4 F | BODY MASS INDEX: 26.85 KG/M2 | HEIGHT: 61 IN | WEIGHT: 142.2 LBS | RESPIRATION RATE: 16 BRPM | DIASTOLIC BLOOD PRESSURE: 75 MMHG | HEART RATE: 76 BPM | OXYGEN SATURATION: 97 % | SYSTOLIC BLOOD PRESSURE: 121 MMHG

## 2022-06-17 DIAGNOSIS — M99.03 SOMATIC DYSFUNCTION OF LUMBAR REGION: ICD-10-CM

## 2022-06-17 DIAGNOSIS — M99.07 SOMATIC DYSFUNCTION OF UPPER EXTREMITY: ICD-10-CM

## 2022-06-17 DIAGNOSIS — M54.2 NECK PAIN: ICD-10-CM

## 2022-06-17 DIAGNOSIS — Z28.21 COVID-19 VACCINATION DECLINED: ICD-10-CM

## 2022-06-17 DIAGNOSIS — M99.00 SOMATIC DYSFUNCTION OF HEAD REGION: ICD-10-CM

## 2022-06-17 DIAGNOSIS — M99.01 SOMATIC DYSFUNCTION OF CERVICAL REGION: ICD-10-CM

## 2022-06-17 DIAGNOSIS — M25.512 CHRONIC LEFT SHOULDER PAIN: Primary | ICD-10-CM

## 2022-06-17 DIAGNOSIS — M99.02 SOMATIC DYSFUNCTION OF THORACIC REGION: ICD-10-CM

## 2022-06-17 DIAGNOSIS — G89.29 CHRONIC LEFT SHOULDER PAIN: Primary | ICD-10-CM

## 2022-06-17 DIAGNOSIS — M99.08 SOMATIC DYSFUNCTION OF RIB CAGE REGION: ICD-10-CM

## 2022-06-17 PROCEDURE — 99213 OFFICE O/P EST LOW 20 MIN: CPT | Mod: 25 | Performed by: STUDENT IN AN ORGANIZED HEALTH CARE EDUCATION/TRAINING PROGRAM

## 2022-06-17 PROCEDURE — 72040 X-RAY EXAM NECK SPINE 2-3 VW: CPT | Mod: 26 | Performed by: RADIOLOGY

## 2022-06-17 PROCEDURE — 98927 OSTEOPATH MANJ 5-6 REGIONS: CPT | Performed by: STUDENT IN AN ORGANIZED HEALTH CARE EDUCATION/TRAINING PROGRAM

## 2022-06-17 NOTE — PROGRESS NOTES
Preceptor Attestation:    I discussed the patient with the resident and evaluated the patient in person. I have verified the content of the note, which accurately reflects my assessment of the patient and the plan of care.   Supervising Physician:  Halina Mariee DO.

## 2022-06-17 NOTE — PATIENT INSTRUCTIONS
"What Is OMT (Osteopathic Manipulative Treatment)?    As part of their education, DOs (doctor of osteopathic medicine) receive special training in the musculoskeletal system (your nerves, bones and muscles).     OMT involves using the hands to diagnose, treat and prevent illness or injury.  Using OMT, your osteopathic physician will move your muscles and joints using techniques including stretching, gentle pressure and resistance.     OMT can help people of all ages and backgrounds. In addition to muscle or joint pain, it can be used to treat a variety of conditions such as asthma, sinus pain, migraines and carpal tunnel syndrome.     For more information, please visit [email or web address]    How to Schedule OMT :  Please make an appointment for \"OMT\" with the .  Please inform them you are scheduling for OMT with any DO provider. A list is provided below:     Halina Mariee, DO Juan José Murray, DO  Mya Fay, DO  Ericka Tracy, DO  Sandrita Regan, DO  Yulia Buckley, DO  Shannon Kellogg, DO  Tomas Martinez, DO    "

## 2022-06-20 DIAGNOSIS — M47.812 CERVICAL SPINE ARTHRITIS: ICD-10-CM

## 2022-06-20 DIAGNOSIS — M19.012 ARTHRITIS OF LEFT ACROMIOCLAVICULAR JOINT: Primary | ICD-10-CM

## 2022-07-11 NOTE — PROGRESS NOTES
"Thurs July 28 AC joint injection     Assessment & Plan     Arthritis of left acromioclavicular joint  - Scheduled AC joint injection at [facility] 7/28/22    Cervical spine arthritis with nerve pain  Chronic left shoulder pain with impingement  Cervical radiculopathy w/ left hand numbness (+subjective weakness)  - Physical Therapy Referral  - EMG  - Return to clinic (with Sports Med) for further discussion after EMG results have resulted      Billing:  Reviewed Dr. Greene's 6/17 note. Also reviewed XR cervical spine and XR shoulder 6/17 and independently interpreted.     Return in about 2 weeks (around 7/26/2022), or if symptoms worsen or fail to improve.    MD Tiffany Jackson MD  [facility]    Subjective     82 year old right hand-dominant female with chronic left shoulder pain who noted new weakness and numbness of left hand about five months ago. States she went to physical therapy which improved symptoms slightly, but she still endorses numbness from shoulder to hand and states \"I cannot hold anything. I drop everything.\" (She draws along her arm from her shoulder down to her hand to trace the path of numbness and states she also has pain that radiates that direction.) She was evaluated by Dr. Greene for these symptoms on 6/17 at which point x-ray left shoulder and x-rayc-spine were obtained and showed  showing moderate-severe degenerative changes left AC joint and mild cervical changes sheryl at C4-C6; respectively.     No significant headaches. Not acute onset. No significant change in weight.       CURRENT MEDICATIONS: She has a current medication list which includes the following prescription(s): acetaminophen, oyster shell calcium/d, carboxymethylcellulose, carboxymethylcellulose sodium, diclofenac, multivitamin w/minerals, pantoprazole, calcium antacid, calcium carbonate-vitamin d, diclofenac, duloxetine, and lidocaine.       EXAM:  /78   Pulse 83   Temp 98.2  F " "(36.8  C) (Oral)   Resp 16   Ht 1.56 m (5' 1.42\")   Wt 64.4 kg (142 lb)   SpO2 97%   BMI 26.47 kg/m      MUSCULOSKELETAL:    Neck:    Neck ROM intact  Spurlings negative  No midline spinal tenderness.   Trap and levator scapulae tenderness left>right.    Shoulder:   Cross arm test positive (left AC tenderness w/ cross arm)  Numbness in all sensory distributions left hand  Shoulder flexion reduced left arm, even w/ passive. (150 active, 170 passive)  Left arm abduction reduced   Supraspinatus/empty can test positive   Internal rotation-- can reach to back pocket only w/ left hand, can't push off  o'briens negative   Interna/external intact   subscap seems intact, but lacking internal rotation     Hand  Interosseous intact. Wrist extension strength intact.   Rock, paper, scissors intact.   Phalen doesn't change hand numbness- always numb   Tinel doesn't change symptoms either       "

## 2022-07-12 ENCOUNTER — OFFICE VISIT (OUTPATIENT)
Dept: FAMILY MEDICINE | Facility: CLINIC | Age: 82
End: 2022-07-12
Payer: COMMERCIAL

## 2022-07-12 VITALS
OXYGEN SATURATION: 97 % | RESPIRATION RATE: 16 BRPM | BODY MASS INDEX: 26.81 KG/M2 | DIASTOLIC BLOOD PRESSURE: 78 MMHG | WEIGHT: 142 LBS | HEART RATE: 83 BPM | HEIGHT: 61 IN | SYSTOLIC BLOOD PRESSURE: 129 MMHG | TEMPERATURE: 98.2 F

## 2022-07-12 DIAGNOSIS — M25.512 CHRONIC LEFT SHOULDER PAIN: ICD-10-CM

## 2022-07-12 DIAGNOSIS — M47.812 CERVICAL SPINE ARTHRITIS WITH NERVE PAIN: ICD-10-CM

## 2022-07-12 DIAGNOSIS — M19.012 ARTHRITIS OF LEFT ACROMIOCLAVICULAR JOINT: Primary | ICD-10-CM

## 2022-07-12 DIAGNOSIS — G89.29 CHRONIC LEFT SHOULDER PAIN: ICD-10-CM

## 2022-07-12 DIAGNOSIS — M54.12 CERVICAL RADICULOPATHY: ICD-10-CM

## 2022-07-12 DIAGNOSIS — M75.42 IMPINGEMENT SYNDROME, SHOULDER, LEFT: ICD-10-CM

## 2022-07-12 DIAGNOSIS — M79.2 CERVICAL SPINE ARTHRITIS WITH NERVE PAIN: ICD-10-CM

## 2022-07-12 DIAGNOSIS — R20.0 HAND NUMBNESS: ICD-10-CM

## 2022-07-12 PROCEDURE — 99214 OFFICE O/P EST MOD 30 MIN: CPT | Performed by: FAMILY MEDICINE

## 2022-07-12 NOTE — PATIENT INSTRUCTIONS
Patient Education   Here is the plan from today's visit    1. Arthritis of left acromioclavicular joint; shoulder pain  Dr Cabrera will call to help schedule injection of AC joint.   Placed referral for physical therapy.   [address] [place] [address]  4 p.m. on the 4th floor of [facility], Checkin 15 minutes early  Dr. Cabrera for ultrasound guided AC joint injection        2. Cervical spine arthritis with nerve pain  3. Numbness down to hand  Suspect neck problems are contributing to left hand numbness and subjective weakness.   EMG      Please call or return to clinic if your symptoms don't go away.    Thank you for coming to [facility] today.  Lab Testing:  **If you had lab testing today and your results are reassuring or normal they will be mailed to you or sent through [employer] within 7 days.   **If the lab tests need quick action we will call you with the results.  **If you are having labs done on a different day, please call [phone] to schedule at [facility] or [phone] for other [facility] locations. Labs do not offer walk-in appointments.  The phone number we will call with results is # [phone] (home) [phone] (work). If this is not the best number please call our clinic and change the number.  Medication Refills:  If you need any refills please call your pharmacy and they will contact us.   If you need to  your refill at a new pharmacy, please contact the new pharmacy directly. The new pharmacy will help you get your medications transferred faster.   Scheduling:  If you have any concerns about today's visit or wish to schedule another appointment please call our office during normal business hours [phone] (8-5:00 M-F)  If a referral was made to an [facility] specialty provider and you do not get a call from central scheduling, please refer to directions on your visit summary or call our office during normal business hours for assistance.    If a Mammogram was ordered for you at the [facility] call [phone] to schedule or change your appointment.  If you had an XRay/CT/Ultrasound/MRI ordered the number is [phone] to schedule or change your radiology appointment.   [facility] has limited ultrasound appointments available on Wednesdays, if you would like your ultrasound at [facility], please call [phone] to schedule.   Medical Concerns:  If you have urgent medical concerns please call [phone] at any time of the day.    Tiffany Cabrera MD

## 2022-10-02 DIAGNOSIS — K21.9 GASTROESOPHAGEAL REFLUX DISEASE WITHOUT ESOPHAGITIS: ICD-10-CM

## 2022-10-02 NOTE — TELEPHONE ENCOUNTER
"Request for medication refill:  Pantoprazole sodium 20 MG    Providers if patient needs an appointment and you are willing to give a one month supply please refill for one month and  send a letter/MyChart using \".SMILLIMITEDREFILL\" .smillimited and route chart to \"P SMI \" (Giving one month refill in non controlled medications is strongly recommended before denial)    If refill has been denied, meaning absolutely no refills without visit, please complete the smart phrase \".smirxrefuse\" and route it to the \"P SMI MED REFILLS\"  pool to inform the patient and the pharmacy.    Estrellita Olivera MA      "

## 2022-10-03 ENCOUNTER — TELEPHONE (OUTPATIENT)
Dept: FAMILY MEDICINE | Facility: CLINIC | Age: 82
End: 2022-10-03

## 2022-10-03 RX ORDER — PANTOPRAZOLE SODIUM 20 MG/1
20 TABLET, DELAYED RELEASE ORAL DAILY
Qty: 90 TABLET | Refills: 1 | Status: SHIPPED | OUTPATIENT
Start: 2022-10-03 | End: 2022-11-21

## 2022-10-14 ENCOUNTER — DOCUMENTATION ONLY (OUTPATIENT)
Dept: FAMILY MEDICINE | Facility: CLINIC | Age: 82
End: 2022-10-14

## 2022-10-14 NOTE — PROGRESS NOTES
"When opening a documentation only encounter, be sure to enter in \"Chief Complaint\" Forms and in \" Comments\" Title of form, description if needed.    Lou is a 82 year old  female  Form received via: Fax  Form now resides in: Provider Monica Blake RN      When opening a documentation only encounter, be sure to enter in \"Chief Complaint\" Forms and in \" Comments\" Title of form, description if needed.    Lou is a 82 year old  female  Form received via: Fax  Form now resides in:  to schedule appointments    Virginia Bean"

## 2022-10-27 ENCOUNTER — OFFICE VISIT (OUTPATIENT)
Dept: FAMILY MEDICINE | Facility: CLINIC | Age: 82
End: 2022-10-27
Payer: COMMERCIAL

## 2022-10-27 VITALS
OXYGEN SATURATION: 98 % | HEART RATE: 83 BPM | DIASTOLIC BLOOD PRESSURE: 81 MMHG | TEMPERATURE: 98.1 F | RESPIRATION RATE: 16 BRPM | SYSTOLIC BLOOD PRESSURE: 127 MMHG

## 2022-10-27 DIAGNOSIS — M54.16 CHRONIC LUMBAR RADICULOPATHY: ICD-10-CM

## 2022-10-27 DIAGNOSIS — H04.123 DRY EYES: ICD-10-CM

## 2022-10-27 DIAGNOSIS — M79.10 MYALGIA: ICD-10-CM

## 2022-10-27 DIAGNOSIS — R05.1 ACUTE COUGH: Primary | ICD-10-CM

## 2022-10-27 LAB
FLUAV AG SPEC QL IA: NEGATIVE
FLUBV AG SPEC QL IA: NEGATIVE

## 2022-10-27 PROCEDURE — U0003 INFECTIOUS AGENT DETECTION BY NUCLEIC ACID (DNA OR RNA); SEVERE ACUTE RESPIRATORY SYNDROME CORONAVIRUS 2 (SARS-COV-2) (CORONAVIRUS DISEASE [COVID-19]), AMPLIFIED PROBE TECHNIQUE, MAKING USE OF HIGH THROUGHPUT TECHNOLOGIES AS DESCRIBED BY CMS-2020-01-R: HCPCS | Performed by: STUDENT IN AN ORGANIZED HEALTH CARE EDUCATION/TRAINING PROGRAM

## 2022-10-27 PROCEDURE — U0005 INFEC AGEN DETEC AMPLI PROBE: HCPCS | Performed by: STUDENT IN AN ORGANIZED HEALTH CARE EDUCATION/TRAINING PROGRAM

## 2022-10-27 PROCEDURE — 99213 OFFICE O/P EST LOW 20 MIN: CPT | Mod: CS | Performed by: STUDENT IN AN ORGANIZED HEALTH CARE EDUCATION/TRAINING PROGRAM

## 2022-10-27 PROCEDURE — 87804 INFLUENZA ASSAY W/OPTIC: CPT | Performed by: STUDENT IN AN ORGANIZED HEALTH CARE EDUCATION/TRAINING PROGRAM

## 2022-10-27 RX ORDER — ACETAMINOPHEN 500 MG
1000 TABLET ORAL EVERY 8 HOURS PRN
Qty: 100 TABLET | Refills: 11 | Status: SHIPPED | OUTPATIENT
Start: 2022-10-27 | End: 2023-06-14

## 2022-10-27 RX ORDER — CARBOXYMETHYLCELLULOSE SODIUM 5 MG/ML
1 SOLUTION/ DROPS OPHTHALMIC 3 TIMES DAILY PRN
Qty: 30 ML | Refills: 3 | Status: SHIPPED | OUTPATIENT
Start: 2022-10-27 | End: 2022-11-21

## 2022-10-27 NOTE — PROGRESS NOTES
Assessment & Plan     Acute cough  Myalgia  Ongoing cough, runny nose, and myalgias for the past 5-6 days. No known sick contacts. Vaccinated against covid x2, no flu shot this season yet.   - Symptomatic; Unknown COVID-19 Virus (Coronavirus) by PCR Nose  - Influenza A & B Antigen - Clinic Collect    Dry eyes  - carboxymethylcellulose (REFRESH PLUS) 0.5 % SOLN ophthalmic solution; Place 1 drop into both eyes 3 times daily as needed for dry eyes    Chronic lumbar radiculopathy  - acetaminophen (TYLENOL) 500 MG tablet; Take 2 tablets (1,000 mg) by mouth every 8 hours as needed for mild pain    Emily Laureano MD  [facility] NANO Blake is a 82 year old, presenting for the following health issues:  Cough (Pt reports x few days dry cough followed by body aches. No fever) and Refill Request (carboxymethylcellulose (REFRESH PLUS) 0.5 % SOLN ophthalmic solution)      HPI     5-6 days of dry cough, sneezing. Intermittently felt febrile, but no temperature measures. Body aches started around the same time, feels different from chronic pain and lower back pain.     Has found that tylenol doesn't help much.       Review of Systems    ROS: 10 point ROS neg other than the symptoms noted above in the HPI.        Objective    /81   Pulse 83   Temp 98.1  F (36.7  C) (Oral)   Resp 16   SpO2 98%   There is no height or weight on file to calculate BMI.  Physical Exam  Vitals reviewed.   Constitutional:       Appearance: Normal appearance.   HENT:      Nose: Rhinorrhea present.      Mouth/Throat:      Mouth: Mucous membranes are moist.   Eyes:      Conjunctiva/sclera: Conjunctivae normal.   Cardiovascular:      Rate and Rhythm: Normal rate and regular rhythm.      Heart sounds: Normal heart sounds.   Pulmonary:      Effort: Pulmonary effort is normal.      Breath sounds: Normal breath sounds.   Musculoskeletal:         General: No swelling.   Skin:     General: Skin is warm and dry.    Neurological:      Mental Status: She is alert.   Psychiatric:         Mood and Affect: Mood normal.         Behavior: Behavior normal.         Thought Content: Thought content normal.         Judgment: Judgment normal.

## 2022-10-27 NOTE — PROGRESS NOTES
Preceptor Attestation:  Patient seen and evaluated in person. I discussed the patient with the resident. I have verified the content of the note, which accurately reflects my assessment of the patient and the plan of care.   Supervising Physician:  Ericka Tracy DO

## 2022-10-28 LAB — SARS-COV-2 RNA RESP QL NAA+PROBE: NEGATIVE

## 2022-11-21 ENCOUNTER — OFFICE VISIT (OUTPATIENT)
Dept: FAMILY MEDICINE | Facility: CLINIC | Age: 82
End: 2022-11-21
Payer: COMMERCIAL

## 2022-11-21 VITALS
SYSTOLIC BLOOD PRESSURE: 127 MMHG | RESPIRATION RATE: 18 BRPM | TEMPERATURE: 98.7 F | BODY MASS INDEX: 25.44 KG/M2 | DIASTOLIC BLOOD PRESSURE: 85 MMHG | WEIGHT: 149 LBS | HEART RATE: 83 BPM | OXYGEN SATURATION: 99 % | HEIGHT: 64 IN

## 2022-11-21 DIAGNOSIS — R09.89 CHEST CONGESTION: Primary | ICD-10-CM

## 2022-11-21 DIAGNOSIS — S39.012A STRAIN OF LUMBAR REGION, INITIAL ENCOUNTER: ICD-10-CM

## 2022-11-21 DIAGNOSIS — H04.123 DRY EYES: ICD-10-CM

## 2022-11-21 DIAGNOSIS — K21.9 GASTROESOPHAGEAL REFLUX DISEASE WITHOUT ESOPHAGITIS: ICD-10-CM

## 2022-11-21 DIAGNOSIS — M54.2 NECK PAIN: ICD-10-CM

## 2022-11-21 LAB
FLUAV AG SPEC QL IA: NEGATIVE
FLUBV AG SPEC QL IA: NEGATIVE

## 2022-11-21 PROCEDURE — 87804 INFLUENZA ASSAY W/OPTIC: CPT | Performed by: STUDENT IN AN ORGANIZED HEALTH CARE EDUCATION/TRAINING PROGRAM

## 2022-11-21 PROCEDURE — 99213 OFFICE O/P EST LOW 20 MIN: CPT | Mod: GC | Performed by: STUDENT IN AN ORGANIZED HEALTH CARE EDUCATION/TRAINING PROGRAM

## 2022-11-21 RX ORDER — LIDOCAINE 4 G/G
1 PATCH TOPICAL EVERY 24 HOURS
Qty: 10 PATCH | Refills: 1 | Status: SHIPPED | OUTPATIENT
Start: 2022-11-21 | End: 2023-03-17

## 2022-11-21 RX ORDER — CARBOXYMETHYLCELLULOSE SODIUM 5 MG/ML
1 SOLUTION/ DROPS OPHTHALMIC 3 TIMES DAILY PRN
Qty: 30 ML | Refills: 3 | Status: SHIPPED | OUTPATIENT
Start: 2022-11-21 | End: 2023-03-17

## 2022-11-21 RX ORDER — PANTOPRAZOLE SODIUM 20 MG/1
20 TABLET, DELAYED RELEASE ORAL DAILY
Qty: 90 TABLET | Refills: 1 | Status: SHIPPED | OUTPATIENT
Start: 2022-11-21 | End: 2023-03-17

## 2022-11-21 ASSESSMENT — PATIENT HEALTH QUESTIONNAIRE - PHQ9: SUM OF ALL RESPONSES TO PHQ QUESTIONS 1-9: 0

## 2022-11-21 NOTE — PROGRESS NOTES
Assessment & Plan     Chest congestion  Patient with 1 week of congestion, sneezing, body aches.  Does note that it is worse in her chest and head, but is specifically associated with sneezing, nonexertional.  Reports that about a month ago she had similar symptoms with negative COVID and flu.  Given the fact that her symptoms started about a week ago, did not get a COVID swab as she would be outside the window for paxlovid.  However did swab for flu as she would qualify for Tamiflu regardless of symptom onset given her age.  Flu swab was negative.      Suspect that the symptoms are secondary to a viral syndrome.  Lung exam is clear, low suspicion for pneumonia at this time.  Given strict return precautions if her symptoms were to worsen, or if she were to get better and then worsen again.  - Influenza A/B antigen    Dry eyes  Refill requested; filled  - carboxymethylcellulose (REFRESH PLUS) 0.5 % SOLN ophthalmic solution  Dispense: 30 mL; Refill: 3    Neck pain  Refill requested; filled  - diclofenac (VOLTAREN) 1 % topical gel  Dispense: 100 g; Refill: 3    Strain of lumbar region, initial encounter  Refill requested; filled  - Lidocaine (LIDOCARE) 4 % Patch  Dispense: 10 patch; Refill: 1    Gastroesophageal reflux disease without esophagitis  Refill requested; filled  - pantoprazole (PROTONIX) 20 MG EC tablet  Dispense: 90 tablet; Refill: 1      Return if symptoms worsen or fail to improve.    Cha Mcdowell MD  [facility] NANO Blake is a 82 year old accompanied by her daughter, presenting for the following health issues:  Pain (Soreness all over body)    [facility] Interpretor ID [id number]    HPI     Congestion and sneezing; same as previous visit, where she tested negative 10/27 (was 5-6 days since it started)     Here today because of being sore all over her body, which started around when her sneezing started    Did get better for a little bit, but when the weather get worse, the  "symptoms came back (1 week ago)    Worse in in head and chest; chest worse after a lot of sneezing and cough    Would like results called to her daughter, who is here today: Deb at [phone]        Objective    /85   Pulse 83   Temp 98.7  F (37.1  C)   Resp 18   Ht 1.626 m (5' 4\")   Wt 67.6 kg (149 lb)   SpO2 99%   BMI 25.58 kg/m    Body mass index is 25.58 kg/m .  Physical Exam  Vitals reviewed.   Constitutional:       General: She is not in acute distress.     Appearance: Normal appearance. She is not ill-appearing, toxic-appearing or diaphoretic.   HENT:      Head: Normocephalic and atraumatic.      Nose: Nose normal.      Mouth/Throat:      Mouth: Mucous membranes are moist.   Eyes:      Conjunctiva/sclera: Conjunctivae normal.   Cardiovascular:      Rate and Rhythm: Normal rate and regular rhythm.      Heart sounds: Normal heart sounds. No murmur heard.  Pulmonary:      Effort: Pulmonary effort is normal. No respiratory distress.      Breath sounds: Normal breath sounds. No wheezing or rales.   Chest:      Chest wall: No tenderness.   Skin:     General: Skin is warm and dry.      Capillary Refill: Capillary refill takes less than 2 seconds.   Neurological:      Mental Status: She is alert. Mental status is at baseline.   Psychiatric:         Mood and Affect: Mood normal.         Behavior: Behavior normal.         Thought Content: Thought content normal.         Judgment: Judgment normal.              "

## 2022-11-21 NOTE — PATIENT INSTRUCTIONS
Patient Education   Here is the plan from today's visit    1. Chest congestion  If it is positive, I will call Deb at [phone] to let her know the results of the swab and we will start you on tamiflu.    - Influenza A/B antigen    2. Dry eyes  - carboxymethylcellulose (REFRESH PLUS) 0.5 % SOLN ophthalmic solution; Place 1 drop into both eyes 3 times daily as needed for dry eyes  Dispense: 30 mL; Refill: 3    3. Neck pain  - diclofenac (VOLTAREN) 1 % topical gel; Use 4x/day on neck.  Dispense: 100 g; Refill: 3    4. Strain of lumbar region, initial encounter  - Lidocaine (LIDOCARE) 4 % Patch; Place 1 patch onto the skin every 24 hours To prevent lidocaine toxicity, patient should be patch free for 12 hrs daily.  Dispense: 10 patch; Refill: 1    5. Gastroesophageal reflux disease without esophagitis  - pantoprazole (PROTONIX) 20 MG EC tablet; Take 1 tablet (20 mg) by mouth daily TAKE ONE TABLET BY MOUTH DAILY BEFORE MEAL  Dispense: 90 tablet; Refill: 1      Please call or return to clinic if your symptoms don't go away.    Follow up plan  Return if symptoms worsen or fail to improve.    Thank you for coming to [facility] today.  Lab Testing:  **If you had lab testing today and your results are reassuring or normal they will be mailed to you or sent through [employer] within 7 days.   **If the lab tests need quick action we will call you with the results.  **If you are having labs done on a different day, please call [phone] to schedule at [facility] or [phone] for other [facility] Outpatient Lab locations. Labs do not offer walk-in appointments.  The phone number we will call with results is # [phone] (home) [phone] (work). If this is not the best number please call our clinic and change the number.  Medication Refills:  If you need any refills please call your pharmacy and they will contact us.   If you need to  your refill at a new pharmacy, please contact the new pharmacy  directly. The new pharmacy will help you get your medications transferred faster.   Scheduling:  If you have any concerns about today's visit or wish to schedule another appointment please call our office during normal business hours [phone] (8-5:00 M-F)  If a referral was made to an [facility] specialty provider and you do not get a call from central scheduling, please refer to directions on your visit summary or call our office during normal business hours for assistance.   If a Mammogram was ordered for you at the [facility] Center call [phone] to schedule or change your appointment.  If you had an XRay/CT/Ultrasound/MRI ordered the number is [phone] to schedule or change your radiology appointment.   [facility] has limited ultrasound appointments available on Wednesdays, if you would like your ultrasound at [facility], please call [phone] to schedule.   Medical Concerns:  If you have urgent medical concerns please call [phone] at any time of the day.    Cha Mcdowell MD

## 2022-11-22 ENCOUNTER — TELEPHONE (OUTPATIENT)
Dept: FAMILY MEDICINE | Facility: CLINIC | Age: 82
End: 2022-11-22

## 2022-11-22 NOTE — TELEPHONE ENCOUNTER
PRIOR AUTHORIZATION DENIED    Medication: Lidocaine (LIDOCARE) 4 % Patch - EPA DENIED    Denial Date: 11/22/2022    Denial Rational:       Appeal Information:

## 2023-02-02 ENCOUNTER — TRANSFERRED RECORDS (OUTPATIENT)
Dept: HEALTH INFORMATION MANAGEMENT | Facility: CLINIC | Age: 83
End: 2023-02-02
Payer: COMMERCIAL

## 2023-02-09 ENCOUNTER — DOCUMENTATION ONLY (OUTPATIENT)
Dept: FAMILY MEDICINE | Facility: CLINIC | Age: 83
End: 2023-02-09
Payer: COMMERCIAL

## 2023-03-07 ENCOUNTER — APPOINTMENT (OUTPATIENT)
Dept: GENERAL RADIOLOGY | Facility: CLINIC | Age: 83
End: 2023-03-07
Attending: EMERGENCY MEDICINE
Payer: COMMERCIAL

## 2023-03-07 ENCOUNTER — HOSPITAL ENCOUNTER (EMERGENCY)
Facility: CLINIC | Age: 83
Discharge: HOME | End: 2023-03-07
Attending: EMERGENCY MEDICINE | Admitting: EMERGENCY MEDICINE
Payer: COMMERCIAL

## 2023-03-07 VITALS
HEART RATE: 75 BPM | BODY MASS INDEX: 24.73 KG/M2 | SYSTOLIC BLOOD PRESSURE: 145 MMHG | TEMPERATURE: 98.6 F | OXYGEN SATURATION: 100 % | DIASTOLIC BLOOD PRESSURE: 77 MMHG | WEIGHT: 144.1 LBS | RESPIRATION RATE: 16 BRPM

## 2023-03-07 DIAGNOSIS — U07.1 INFECTION DUE TO 2019 NOVEL CORONAVIRUS: ICD-10-CM

## 2023-03-07 LAB
ALBUMIN SERPL BCG-MCNC: 3.9 G/DL (ref 3.5–5.2)
ALP SERPL-CCNC: 94 U/L (ref 35–104)
ALT SERPL W P-5'-P-CCNC: 32 U/L (ref 10–35)
ANION GAP SERPL CALCULATED.3IONS-SCNC: 11 MMOL/L (ref 7–15)
AST SERPL W P-5'-P-CCNC: 33 U/L (ref 10–35)
ATRIAL RATE - MUSE: 76 BPM
BASOPHILS # BLD AUTO: 0 10E3/UL (ref 0–0.2)
BASOPHILS NFR BLD AUTO: 0 %
BILIRUB SERPL-MCNC: <0.2 MG/DL
BUN SERPL-MCNC: 10.7 MG/DL (ref 8–23)
CALCIUM SERPL-MCNC: 8.7 MG/DL (ref 8.8–10.2)
CHLORIDE SERPL-SCNC: 98 MMOL/L (ref 98–107)
CREAT SERPL-MCNC: 0.51 MG/DL (ref 0.51–0.95)
D DIMER PPP FEU-MCNC: 0.33 UG/ML FEU (ref 0–0.5)
DEPRECATED HCO3 PLAS-SCNC: 21 MMOL/L (ref 22–29)
DIASTOLIC BLOOD PRESSURE - MUSE: NORMAL MMHG
EOSINOPHIL # BLD AUTO: 0.5 10E3/UL (ref 0–0.7)
EOSINOPHIL NFR BLD AUTO: 10 %
ERYTHROCYTE [DISTWIDTH] IN BLOOD BY AUTOMATED COUNT: 12.3 % (ref 10–15)
GFR SERPL CREATININE-BSD FRML MDRD: >90 ML/MIN/1.73M2
GLUCOSE SERPL-MCNC: 135 MG/DL (ref 70–99)
HCT VFR BLD AUTO: 37.8 % (ref 35–47)
HGB BLD-MCNC: 12.6 G/DL (ref 11.7–15.7)
HOLD SPECIMEN: NORMAL
IMM GRANULOCYTES # BLD: 0 10E3/UL
IMM GRANULOCYTES NFR BLD: 0 %
INTERPRETATION ECG - MUSE: NORMAL
LYMPHOCYTES # BLD AUTO: 2.9 10E3/UL (ref 0.8–5.3)
LYMPHOCYTES NFR BLD AUTO: 62 %
MCH RBC QN AUTO: 30.8 PG (ref 26.5–33)
MCHC RBC AUTO-ENTMCNC: 33.3 G/DL (ref 31.5–36.5)
MCV RBC AUTO: 92 FL (ref 78–100)
MONOCYTES # BLD AUTO: 0.3 10E3/UL (ref 0–1.3)
MONOCYTES NFR BLD AUTO: 6 %
NEUTROPHILS # BLD AUTO: 1 10E3/UL (ref 1.6–8.3)
NEUTROPHILS NFR BLD AUTO: 22 %
NRBC # BLD AUTO: 0 10E3/UL
NRBC BLD AUTO-RTO: 0 /100
NT-PROBNP SERPL-MCNC: <36 PG/ML (ref 0–1800)
P AXIS - MUSE: 59 DEGREES
PLATELET # BLD AUTO: 219 10E3/UL (ref 150–450)
POTASSIUM SERPL-SCNC: 4.1 MMOL/L (ref 3.4–5.3)
PR INTERVAL - MUSE: 170 MS
PROT SERPL-MCNC: 6.9 G/DL (ref 6.4–8.3)
QRS DURATION - MUSE: 66 MS
QT - MUSE: 390 MS
QTC - MUSE: 438 MS
R AXIS - MUSE: 4 DEGREES
RBC # BLD AUTO: 4.09 10E6/UL (ref 3.8–5.2)
SARS-COV-2 RNA RESP QL NAA+PROBE: POSITIVE
SODIUM SERPL-SCNC: 130 MMOL/L (ref 136–145)
SYSTOLIC BLOOD PRESSURE - MUSE: NORMAL MMHG
T AXIS - MUSE: 48 DEGREES
TROPONIN T SERPL HS-MCNC: <6 NG/L
VENTRICULAR RATE- MUSE: 76 BPM
WBC # BLD AUTO: 4.7 10E3/UL (ref 4–11)

## 2023-03-07 PROCEDURE — C9803 HOPD COVID-19 SPEC COLLECT: HCPCS | Performed by: EMERGENCY MEDICINE

## 2023-03-07 PROCEDURE — 99284 EMERGENCY DEPT VISIT MOD MDM: CPT | Mod: CS | Performed by: EMERGENCY MEDICINE

## 2023-03-07 PROCEDURE — 93010 ELECTROCARDIOGRAM REPORT: CPT | Performed by: EMERGENCY MEDICINE

## 2023-03-07 PROCEDURE — U0003 INFECTIOUS AGENT DETECTION BY NUCLEIC ACID (DNA OR RNA); SEVERE ACUTE RESPIRATORY SYNDROME CORONAVIRUS 2 (SARS-COV-2) (CORONAVIRUS DISEASE [COVID-19]), AMPLIFIED PROBE TECHNIQUE, MAKING USE OF HIGH THROUGHPUT TECHNOLOGIES AS DESCRIBED BY CMS-2020-01-R: HCPCS | Performed by: EMERGENCY MEDICINE

## 2023-03-07 PROCEDURE — 84484 ASSAY OF TROPONIN QUANT: CPT | Performed by: EMERGENCY MEDICINE

## 2023-03-07 PROCEDURE — 36415 COLL VENOUS BLD VENIPUNCTURE: CPT | Performed by: EMERGENCY MEDICINE

## 2023-03-07 PROCEDURE — 71046 X-RAY EXAM CHEST 2 VIEWS: CPT

## 2023-03-07 PROCEDURE — 99285 EMERGENCY DEPT VISIT HI MDM: CPT | Mod: CS,25 | Performed by: EMERGENCY MEDICINE

## 2023-03-07 PROCEDURE — 85025 COMPLETE CBC W/AUTO DIFF WBC: CPT | Performed by: EMERGENCY MEDICINE

## 2023-03-07 PROCEDURE — 85379 FIBRIN DEGRADATION QUANT: CPT | Performed by: EMERGENCY MEDICINE

## 2023-03-07 PROCEDURE — 250N000013 HC RX MED GY IP 250 OP 250 PS 637: Performed by: EMERGENCY MEDICINE

## 2023-03-07 PROCEDURE — 80053 COMPREHEN METABOLIC PANEL: CPT | Performed by: EMERGENCY MEDICINE

## 2023-03-07 PROCEDURE — 83880 ASSAY OF NATRIURETIC PEPTIDE: CPT | Performed by: EMERGENCY MEDICINE

## 2023-03-07 PROCEDURE — 93005 ELECTROCARDIOGRAM TRACING: CPT | Performed by: EMERGENCY MEDICINE

## 2023-03-07 RX ORDER — ACETAMINOPHEN 500 MG
1000 TABLET ORAL ONCE
Status: COMPLETED | OUTPATIENT
Start: 2023-03-07 | End: 2023-03-07

## 2023-03-07 RX ADMIN — ACETAMINOPHEN 1000 MG: 500 TABLET ORAL at 13:08

## 2023-03-07 ASSESSMENT — ACTIVITIES OF DAILY LIVING (ADL)
ADLS_ACUITY_SCORE: 35
ADLS_ACUITY_SCORE: 35

## 2023-03-07 NOTE — DISCHARGE INSTRUCTIONS
You tested positive for coronavirus.  Please isolate from others particularly if they high risk.  If you must be around others please wear high-quality mask.  Isolation days are days 0 through 5 (first day of symptom is day 5, so complete isolation on day 6 after symptoms started).  Remain masked in any spaces around other people till day 10.  Avoid any person who would be high risk of severe complications from COVID.  You can also utilize home antigen testing, in general you are considered infectious as long as you have a positive antigen test so you can utilize this to determine when you can be unmasked around others.    Please monitor your oxygen saturations.  If they fall below 90 please return for repeat evaluation.  If you develop shortness of breath or you get sicker with new or worsening symptoms please return to the emergency department for repeat evaluation.

## 2023-03-07 NOTE — ED PROVIDER NOTES
"    [facility] EMERGENCY DEPARTMENT ([facility])     March 7, 2023     History     Chief Complaint   Patient presents with     Cough     Cough, back pain, chills. For past few days.     Back Pain     Starts from upper back and radiates to chest and lower back     HPI  Lou Winston is a 83 year old female with a past medical history significant for H. pylori infection, cervical spine arthritis, GERD who presents to the Emergency Department for evaluation of cough and chest pain. Patient's daughter interprets [place] for her.  Patient declined an .  Patient states that for 3 days she has had a dry cough that started at the same time as chest pain.  She states that the chest pain is constant and exacerbated by coughing.  States that she also had shortness of breath on Saturday (3/4) which lasted for 2-3 days but is \"not so bad\" today.  Notes that she felt short of breath both laying down and standing up, and that she had chills and fatigue at the time of shortness of breath onset. No known sick contacts. No fevers. No known history of heart problems.     Patient did not test for Covid today prior to arrival. Patient does test positive for COVID-19 here in the ED.    In addition the patient reports some chronic issues bothering her today, including pain from her neck that travels down her back. She states that she has had this back pain for 2 years and has been evaluated for it and given lidocaine patches and pain medication, which typically provides temporary relief. She notes that the chronic back pain is exacerbated by her new cough. She also endorses chronic swelling to her right leg with associated pain shooting up her right leg when she steps on it, states that this lower extremity swelling and pain has also been ongoing for years and is not new or unusual. She has also been evaluated for chronic pain shooting up her upper extremities.      Past Medical History  Past Medical History:   Diagnosis " Date     Depressive disorder      Past Surgical History:   Procedure Laterality Date     NO HISTORY OF SURGERY       acetaminophen (TYLENOL) 500 MG tablet  nirmatrelvir and ritonavir (PAXLOVID) 300 mg/100 mg therapy pack  pantoprazole (PROTONIX) 20 MG EC tablet  Calcium Carb-Cholecalciferol (OYSTER SHELL CALCIUM) 500-10 MG-MCG TABS  Calcium Carb-Cholecalciferol (OYSTER SHELL CALCIUM/D) 500-5 MG-MCG TABS  carboxymethylcellulose (REFRESH PLUS) 0.5 % SOLN ophthalmic solution  diclofenac (VOLTAREN) 1 % topical gel  Lidocaine (LIDOCARE) 4 % Patch  multivitamin w/minerals (THERA-VIT-M) tablet      No Known Allergies  Family History  Family History   Family history unknown: Yes     Social History   Social History     Tobacco Use     Smoking status: Never     Smokeless tobacco: Never   Vaping Use     Vaping Use: Never used   Substance Use Topics     Alcohol use: No     Drug use: No      Past medical history, past surgical history, medications, allergies, family history, and social history were reviewed with the patient. No additional pertinent items.      A medically appropriate review of systems was performed with pertinent positives and negatives noted in the HPI, and all other systems negative.    Physical Exam   BP: 135/74  Pulse: 83  Temp: 98.5  F (36.9  C)  Resp: 16  Weight: 65.4 kg (144 lb 1.6 oz)  SpO2: 98 %  Physical Exam  General: awake, alert, NAD  Head: normal cephalic  HEENT: pupils equal, conjugate gaze in tact  Neck: Supple  CV: regular rate and rhythm without murmur  Lungs: clear to ascultation  Abd: soft, non-tender, no guarding, no peritoneal signs  EXT: lower extremities without swelling or edema, no calf tenderness bilaterally   neuro: awake, answers questions appropriately. No focal deficits noted        ED Course, Procedures, & Data     1:18 PM  The patient was seen and examined by Norbert Sanchez MD in Room [facility].    Procedures       ED Course Selections:        EKG Interpretation:      Interpreted by  Norbert Sanchez MD  Time reviewed: 11:52:02  Symptoms at time of EKG: cough, chest pain, back pain   Rhythm: normal sinus   Rate: 76 bpm  Axis: Normal  Ectopy: none  Conduction: normal  ST Segments/ T Waves: No ST-T wave changes and No acute ischemic changes  Q Waves: none  Comparison to prior: No old EKG available    Clinical Impression: normal EKG. No sign of acute ischemia.                           Results for orders placed or performed during the hospital encounter of 03/07/23   XR Chest 2 Views     Status: None    Narrative    XR CHEST 2 VIEWS   3/7/2023 3:28 PM     HISTORY: chest pain    COMPARISON: Chest radiograph 10/2/2019      Impression    IMPRESSION: No acute cardiopulmonary disease.    MISSY HAYNES MD         SYSTEM ID:  [id number]   Symptomatic COVID-19 Virus (Coronavirus) by PCR Nasopharyngeal     Status: Abnormal    Specimen: Nasopharyngeal; Swab   Result Value Ref Range    SARS CoV2 PCR Positive (A) Negative    Narrative    Testing was performed using the Xpert Xpress SARS-CoV-2 Assay on the Cepheid Gene-Xpert Instrument Systems. Additional information about this Emergency Use Authorization (EUA) assay can be found via the Lab Guide. This test should be ordered for the detection of SARS-CoV-2 in individuals who meet SARS-CoV-2 clinical and/or epidemiological criteria as well as from individuals without symptoms or other reasons to suspect COVID-19. Test performance for asymptomatic patients has only been established in anterior nasal swab specimens. This test is for in vitro diagnostic use under the FDA EUA for laboratories certified under CLIA to perform high complexity testing. This test has not been FDA cleared or approved. A negative result does not rule out the presence of PCR inhibitors in the specimen or target RNA concentration below the limit of detection for the assay. The possibility of a false negative should be considered if the patient's recent exposure or clinical presentation  suggests COVID-19. This test was validated by the [facility]. This laboratory is certified under the Clinical Laboratory Improvement Amendments (CLIA) as qualified to perform high complexity laboratory testing.     D dimer quantitative     Status: Normal   Result Value Ref Range    D-Dimer Quantitative 0.33 0.00 - 0.50 ug/mL FEU    Narrative    This D-dimer assay is intended for use in conjunction with a clinical pretest probability assessment model to exclude pulmonary embolism (PE) and deep venous thrombosis (DVT) in outpatients suspected of PE or DVT. The cut-off value is 0.50 ug/mL FEU.   Comprehensive metabolic panel     Status: Abnormal   Result Value Ref Range    Sodium 130 (L) 136 - 145 mmol/L    Potassium 4.1 3.4 - 5.3 mmol/L    Chloride 98 98 - 107 mmol/L    Carbon Dioxide (CO2) 21 (L) 22 - 29 mmol/L    Anion Gap 11 7 - 15 mmol/L    Urea Nitrogen 10.7 8.0 - 23.0 mg/dL    Creatinine 0.51 0.51 - 0.95 mg/dL    Calcium 8.7 (L) 8.8 - 10.2 mg/dL    Glucose 135 (H) 70 - 99 mg/dL    Alkaline Phosphatase 94 35 - 104 U/L    AST 33 10 - 35 U/L    ALT 32 10 - 35 U/L    Protein Total 6.9 6.4 - 8.3 g/dL    Albumin 3.9 3.5 - 5.2 g/dL    Bilirubin Total <0.2 <=1.2 mg/dL    GFR Estimate >90 >60 mL/min/1.73m2   Troponin T, High Sensitivity     Status: Normal   Result Value Ref Range    Troponin T, High Sensitivity <6 <=14 ng/L   Nt probnp inpatient (BNP)     Status: Normal   Result Value Ref Range    N terminal Pro BNP Inpatient <36 0 - 1,800 pg/mL   CBC with platelets and differential     Status: Abnormal   Result Value Ref Range    WBC Count 4.7 4.0 - 11.0 10e3/uL    RBC Count 4.09 3.80 - 5.20 10e6/uL    Hemoglobin 12.6 11.7 - 15.7 g/dL    Hematocrit 37.8 35.0 - 47.0 %    MCV 92 78 - 100 fL    MCH 30.8 26.5 - 33.0 pg    MCHC 33.3 31.5 - 36.5 g/dL    RDW 12.3 10.0 - 15.0 %    Platelet Count 219 150 - 450 10e3/uL    % Neutrophils 22 %    % Lymphocytes 62 %    % Monocytes 6 %    %  Eosinophils 10 %    % Basophils 0 %    % Immature Granulocytes 0 %    NRBCs per 100 WBC 0 <1 /100    Absolute Neutrophils 1.0 (L) 1.6 - 8.3 10e3/uL    Absolute Lymphocytes 2.9 0.8 - 5.3 10e3/uL    Absolute Monocytes 0.3 0.0 - 1.3 10e3/uL    Absolute Eosinophils 0.5 0.0 - 0.7 10e3/uL    Absolute Basophils 0.0 0.0 - 0.2 10e3/uL    Absolute Immature Granulocytes 0.0 <=0.4 10e3/uL    Absolute NRBCs 0.0 10e3/uL   Extra Tube     Status: None    Narrative    The following orders were created for panel order Extra Tube.  Procedure                               Abnormality         Status                     ---------                               -----------         ------                     Extra Red Top Tube[[id number]]                               Final result                 Please view results for these tests on the individual orders.   Extra Red Top Tube     Status: None   Result Value Ref Range    Hold Specimen [facility]    EKG 12 lead     Status: None   Result Value Ref Range    Systolic Blood Pressure  mmHg    Diastolic Blood Pressure  mmHg    Ventricular Rate 76 BPM    Atrial Rate 76 BPM    CA Interval 170 ms    QRS Duration 66 ms     ms    QTc 438 ms    P Axis 59 degrees    R AXIS 4 degrees    T Axis 48 degrees    Interpretation ECG       Sinus rhythm  Normal ECG  Unconfirmed report - interpretation of this ECG is computer generated - see medical record for final interpretation    Confirmed by - EMERGENCY ROOM, PHYSICIAN ([id number]),  OPAL BENZ ([id number]) on 3/7/2023 3:07:41 PM     CBC with platelets differential     Status: Abnormal    Narrative    The following orders were created for panel order CBC with platelets differential.  Procedure                               Abnormality         Status                     ---------                               -----------         ------                     CBC with platelets and d...[[id number]]  Abnormal            Final result                 Please view results  for these tests on the individual orders.     Medications   acetaminophen (TYLENOL) tablet 1,000 mg (1,000 mg Oral $Given 3/7/23 2203)     Labs Ordered and Resulted from Time of ED Arrival to Time of ED Departure   COVID-19 VIRUS (CORONAVIRUS) BY PCR - Abnormal       Result Value    SARS CoV2 PCR Positive (*)    COMPREHENSIVE METABOLIC PANEL - Abnormal    Sodium 130 (*)     Potassium 4.1      Chloride 98      Carbon Dioxide (CO2) 21 (*)     Anion Gap 11      Urea Nitrogen 10.7      Creatinine 0.51      Calcium 8.7 (*)     Glucose 135 (*)     Alkaline Phosphatase 94      AST 33      ALT 32      Protein Total 6.9      Albumin 3.9      Bilirubin Total <0.2      GFR Estimate >90     CBC WITH PLATELETS AND DIFFERENTIAL - Abnormal    WBC Count 4.7      RBC Count 4.09      Hemoglobin 12.6      Hematocrit 37.8      MCV 92      MCH 30.8      MCHC 33.3      RDW 12.3      Platelet Count 219      % Neutrophils 22      % Lymphocytes 62      % Monocytes 6      % Eosinophils 10      % Basophils 0      % Immature Granulocytes 0      NRBCs per 100 WBC 0      Absolute Neutrophils 1.0 (*)     Absolute Lymphocytes 2.9      Absolute Monocytes 0.3      Absolute Eosinophils 0.5      Absolute Basophils 0.0      Absolute Immature Granulocytes 0.0      Absolute NRBCs 0.0     D DIMER QUANTITATIVE - Normal    D-Dimer Quantitative 0.33     TROPONIN T, HIGH SENSITIVITY - Normal    Troponin T, High Sensitivity <6     NT PROBNP INPATIENT - Normal    N terminal Pro BNP Inpatient <36       XR Chest 2 Views   Final Result   IMPRESSION: No acute cardiopulmonary disease.      MISSY HAYNES MD            SYSTEM ID:  [id number]             Critical care was not performed.     Medical Decision Making  The patient's presentation was of moderate complexity (an acute illness with systemic symptoms).    The patient's evaluation involved:  review of external note(s) from 1 sources (see separate area of note for details)  ordering and/or review of 3+ test(s)  in this encounter (see separate area of note for details)  review of 2 test result(s) ordered prior to this encounter (see separate area of note for details)    The patient's management necessitated moderate risk (prescription drug management including medications given in the ED).      Assessment & Plan      Patient is an 83-year-old female who presents with cough and diffuse chest pain.  Patient is well-appearing, nontoxic.  Would consider some type of viral etiology including COVID, ACS, influenza or other viral URI.  Would also consider some type of CHF presentation or some other type of bacterial pneumonia.  Will obtain basic laboratory studies, chest x-ray, EKG and troponin.  On exam she is well-appearing, nontoxic, has a normal heart and lung exam.  Lower extremities appear normal without significant swelling or edema.    Initial evaluation will include EKG, trops, laboratory studies and chest x-ray.    Patient's EKG shows no signs of acute ischemia.  Her troponin is negative this is reassuring given the many day duration of symptoms.  Her BNP is negative.  D-dimer also negative which is reassuring against PE/DVT.  Chest x-ray without any acute cardiopulmonary disease.  No focal pneumonias, no evidence of CHF.  Patient was positive for COVID.  Will review medications and prescribe Paxlovid.    On reassessment patient continued to have normal oxygenation saturations.  Normal O2 sats, normal work of breathing.  Discussed COVID isolation, return precautions and monitoring O2 sats at home.  Patient and her daughter expressed understanding.    This part of the medical record was transcribed by Martha Bosch, Medical Scribe, from a dictation done by Norbert Sanchez MD.      I have reviewed the nursing notes. I have reviewed the findings, diagnosis, plan and need for follow up with the patient.    I have also reviewed and interpreted all laboratory and imaging studies that are available.     Discharge Medication List  as of 3/7/2023  4:40 PM      START taking these medications    Details   nirmatrelvir and ritonavir (PAXLOVID) 300 mg/100 mg therapy pack Take 3 tablets by mouth 2 times daily for 5 days, Disp-30 tablet, R-0, Local PrintDate of symptom onset: 3/4/23; Risk criteria met: Yes; Positive Covid-19 test: Yes; Weight >40 kg Yes; Renal fxn: normal;  Drug-Drug interactions reviewed & addressed: No             Final diagnoses:   Infection due to 2019 novel coronavirus     IMartha, am serving as a trained medical scribe to document services personally performed by Norbert Sanchez MD, based on the provider's statements to me.   Norbert BLANCO MD, was physically present and have reviewed and verified the accuracy of this note documented by Martha Bosch.    Norbert Sanchez MD  [facility] EMERGENCY DEPARTMENT  3/7/2023     Norbert Sanchez MD  03/08/23 1229

## 2023-03-07 NOTE — ED TRIAGE NOTES
Triage Assessment     Row Name 03/07/23 1146       Triage Assessment (Adult)    Airway WDL WDL       Respiratory WDL    Respiratory WDL WDL       Skin Circulation/Temperature WDL    Skin Circulation/Temperature WDL WDL       Peripheral/Neurovascular WDL    Peripheral Neurovascular WDL WDL       Cognitive/Neuro/Behavioral WDL    Cognitive/Neuro/Behavioral WDL WDL

## 2023-03-14 ENCOUNTER — DOCUMENTATION ONLY (OUTPATIENT)
Dept: FAMILY MEDICINE | Facility: CLINIC | Age: 83
End: 2023-03-14
Payer: COMMERCIAL

## 2023-03-14 NOTE — PROGRESS NOTES
"When opening a documentation only encounter, be sure to enter in \"Chief Complaint\" Forms and in \" Comments\" Title of form, description if needed.    Lou is a 83 year old  female  Form received via: Fax  Form now resides in: Provider Ready    Phuong Richmond MA                  "

## 2023-03-17 ENCOUNTER — OFFICE VISIT (OUTPATIENT)
Dept: FAMILY MEDICINE | Facility: CLINIC | Age: 83
End: 2023-03-17
Payer: COMMERCIAL

## 2023-03-17 VITALS
HEART RATE: 86 BPM | BODY MASS INDEX: 27.38 KG/M2 | TEMPERATURE: 97.9 F | OXYGEN SATURATION: 97 % | WEIGHT: 145 LBS | SYSTOLIC BLOOD PRESSURE: 138 MMHG | HEIGHT: 61 IN | DIASTOLIC BLOOD PRESSURE: 79 MMHG | RESPIRATION RATE: 12 BRPM

## 2023-03-17 DIAGNOSIS — R73.03 PREDIABETES: ICD-10-CM

## 2023-03-17 DIAGNOSIS — H04.123 DRY EYES: ICD-10-CM

## 2023-03-17 DIAGNOSIS — S46.001A INJURY OF RIGHT ROTATOR CUFF, INITIAL ENCOUNTER: ICD-10-CM

## 2023-03-17 DIAGNOSIS — S39.012A STRAIN OF LUMBAR REGION, INITIAL ENCOUNTER: ICD-10-CM

## 2023-03-17 DIAGNOSIS — F33.0 MILD EPISODE OF RECURRENT MAJOR DEPRESSIVE DISORDER (H): ICD-10-CM

## 2023-03-17 DIAGNOSIS — U07.1 INFECTION DUE TO 2019 NOVEL CORONAVIRUS: Primary | ICD-10-CM

## 2023-03-17 DIAGNOSIS — K21.9 GASTROESOPHAGEAL REFLUX DISEASE WITHOUT ESOPHAGITIS: ICD-10-CM

## 2023-03-17 DIAGNOSIS — M54.2 NECK PAIN: ICD-10-CM

## 2023-03-17 DIAGNOSIS — Z02.89 ENCOUNTER FOR COMPLETION OF FORM WITH PATIENT: ICD-10-CM

## 2023-03-17 DIAGNOSIS — G89.29 CHRONIC BILATERAL LOW BACK PAIN WITH RIGHT-SIDED SCIATICA: ICD-10-CM

## 2023-03-17 DIAGNOSIS — M54.41 CHRONIC BILATERAL LOW BACK PAIN WITH RIGHT-SIDED SCIATICA: ICD-10-CM

## 2023-03-17 DIAGNOSIS — Z00.00 PREVENTATIVE HEALTH CARE: ICD-10-CM

## 2023-03-17 LAB — HBA1C MFR BLD: 6.4 % (ref 0–5.6)

## 2023-03-17 PROCEDURE — 90471 IMMUNIZATION ADMIN: CPT | Performed by: STUDENT IN AN ORGANIZED HEALTH CARE EDUCATION/TRAINING PROGRAM

## 2023-03-17 PROCEDURE — 36415 COLL VENOUS BLD VENIPUNCTURE: CPT | Performed by: STUDENT IN AN ORGANIZED HEALTH CARE EDUCATION/TRAINING PROGRAM

## 2023-03-17 PROCEDURE — 90662 IIV NO PRSV INCREASED AG IM: CPT | Performed by: STUDENT IN AN ORGANIZED HEALTH CARE EDUCATION/TRAINING PROGRAM

## 2023-03-17 PROCEDURE — 99214 OFFICE O/P EST MOD 30 MIN: CPT | Mod: 25 | Performed by: STUDENT IN AN ORGANIZED HEALTH CARE EDUCATION/TRAINING PROGRAM

## 2023-03-17 PROCEDURE — 83036 HEMOGLOBIN GLYCOSYLATED A1C: CPT | Performed by: STUDENT IN AN ORGANIZED HEALTH CARE EDUCATION/TRAINING PROGRAM

## 2023-03-17 RX ORDER — CALCIUM CARBONATE/VITAMIN D3 500 MG-10
1 TABLET ORAL DAILY
Qty: 90 TABLET | Refills: 3 | Status: SHIPPED | OUTPATIENT
Start: 2023-03-17

## 2023-03-17 RX ORDER — LIDOCAINE 4 G/G
1 PATCH TOPICAL EVERY 24 HOURS
Qty: 10 PATCH | Refills: 1 | Status: SHIPPED | OUTPATIENT
Start: 2023-03-17 | End: 2023-09-22

## 2023-03-17 RX ORDER — FAMOTIDINE 40 MG/1
40 TABLET, FILM COATED ORAL DAILY
Qty: 90 TABLET | Refills: 3 | Status: SHIPPED | OUTPATIENT
Start: 2023-03-17 | End: 2023-06-14

## 2023-03-17 RX ORDER — PANTOPRAZOLE SODIUM 20 MG/1
20 TABLET, DELAYED RELEASE ORAL DAILY
Qty: 90 TABLET | Refills: 1 | Status: SHIPPED | OUTPATIENT
Start: 2023-03-17 | End: 2023-06-14

## 2023-03-17 RX ORDER — CARBOXYMETHYLCELLULOSE SODIUM 5 MG/ML
1 SOLUTION/ DROPS OPHTHALMIC 3 TIMES DAILY PRN
Qty: 30 ML | Refills: 3 | Status: SHIPPED | OUTPATIENT
Start: 2023-03-17 | End: 2024-04-24

## 2023-03-17 RX ORDER — MULTIPLE VITAMINS W/ MINERALS TAB 9MG-400MCG
1 TAB ORAL DAILY
Qty: 90 TABLET | Refills: 3 | Status: SHIPPED | OUTPATIENT
Start: 2023-03-17 | End: 2023-06-14

## 2023-03-17 ASSESSMENT — PAIN SCALES - GENERAL: PAINLEVEL: EXTREME PAIN (8)

## 2023-03-17 NOTE — PATIENT INSTRUCTIONS
Patient Education   Here is the plan from today's visit    1. Dry eyes - refill  - carboxymethylcellulose (REFRESH PLUS) 0.5 % SOLN ophthalmic solution; Place 1 drop into both eyes 3 times daily as needed for dry eyes  Dispense: 30 mL; Refill: 3    2. Gastroesophageal reflux disease without esophagitis  Let's try the pepcid (famotidine) before taking the pantoprazole and see if that helps your acid reflux    - pantoprazole (PROTONIX) 20 MG EC tablet; Take 1 tablet (20 mg) by mouth daily TAKE ONE TABLET BY MOUTH DAILY BEFORE MEAL  Dispense: 90 tablet; Refill: 1  - multivitamin w/minerals (THERA-VIT-M) tablet; Take 1 tablet by mouth daily  Dispense: 90 tablet; Refill: 3  - famotidine (PEPCID) 40 MG tablet; Take 1 tablet (40 mg) by mouth daily  Dispense: 90 tablet; Refill: 3    3. Preventative health care  refill  - Calcium Carb-Cholecalciferol (OYSTER SHELL CALCIUM) 500-10 MG-MCG TABS; Take 1 tablet by mouth daily  Dispense: 90 tablet; Refill: 3    5. Strain of lumbar region, initial encounter  Refill  - Lidocaine (LIDOCARE) 4 % Patch; Place 1 patch onto the skin every 24 hours To prevent lidocaine toxicity, patient should be patch free for 12 hrs daily.  Dispense: 10 patch; Refill: 1    6. Neck pain  Refill (I would use it on your shoulder too)  - diclofenac (VOLTAREN) 1 % topical gel; Use 4x/day on neck.  Dispense: 100 g; Refill: 3    7. Chronic bilateral low back pain with right-sided sciatica  8. Injury of right rotator cuff, initial encounter  - Physical Therapy Referral; Future    9. Prediabetes  - Hemoglobin A1c; Future      Please call or return to clinic if your symptoms don't go away.    Follow up plan  Return if symptoms worsen or fail to improve.    Thank you for coming to [facility] today.  Lab Testing:  **If you had lab testing today and your results are reassuring or normal they will be mailed to you or sent through [employer] within 7 days.   **If the lab tests need quick action we will call you with  the results.  **If you are having labs done on a different day, please call [phone] to schedule at [facility] or [phone] for other [facility] Outpatient Lab locations. Labs do not offer walk-in appointments.  The phone number we will call with results is # [phone] (home) [phone] (work). If this is not the best number please call our clinic and change the number.  Medication Refills:  If you need any refills please call your pharmacy and they will contact us.   If you need to  your refill at a new pharmacy, please contact the new pharmacy directly. The new pharmacy will help you get your medications transferred faster.   Scheduling:  If you have any concerns about today's visit or wish to schedule another appointment please call our office during normal business hours [phone] (8-5:00 M-F). If you can no longer make a scheduled visit, please cancel via [employer] or call us to cancel.   If a referral was made to an [facility] specialty provider and you do not get a call from central scheduling, please refer to directions on your visit summary or call our office during normal business hours for assistance.   If a Mammogram was ordered for you at the Breast Center call [phone] to schedule or change your appointment.  If you had an XRay/CT/Ultrasound/MRI ordered the number is [phone] to schedule or change your radiology appointment.   [facility] has limited ultrasound appointments available on Wednesdays, if you would like your ultrasound at [facility], please call [phone] to schedule.   Medical Concerns:  If you have urgent medical concerns please call [phone] at any time of the day.    Cha Mcdowell MD

## 2023-03-17 NOTE — PROGRESS NOTES
Form has been completed by provider.     Form sent out via: Fax to [facility] day [facility]  at Fax Number: [phone]  Patient informed: n/a  Output date: March 17, 2023    Virginia Bean      **Please close the encounter**

## 2023-03-17 NOTE — PROGRESS NOTES
"  Assessment & Plan     Infection due to 2019 novel coronavirus  ED follow up from 3/7 where she was diagnosed with COVID and sent home on Paxlovid, which she is no longer on. She is feeling improvement of her symptoms. Describing as just a \"cold\" currently. Lung CTAB. O2 Sat stable.    Encounter for completion of form with patient  We also filled out her adult day care form today. For future reference: dietary preference of wheat over white, but otherwise no large considerations; medication regimen she does not need assistance for; she may engage in structured exercise program.    Chronic bilateral low back pain with right-sided sciatica  Injury of right rotator cuff, initial encounter  Patients main complaint is bilateral leg pain that is sharp/stinging associated with back pain along with right shoulder pain.  She is particularly distressed that she does not have full range of motion.  On exam, symptoms are consistent with her lumbar strain (historically noted) with now sciatica leg pain on the right side.  Unable to reproduce her reported symptoms on the left.  Upper body consistent with rotator cuff injury with a positive empty can test on the right as well as limited range of motion secondary to pain to 90 degrees.  Negative drop test.  Referred to physical therapy.  Daughter and her note that she does not like going to physical therapy when it is cold outside (though her symptoms also worsen with the cold) -encouraged them to schedule for April when they call so that they have an appointment on the books that they will realistically be able to attend.  - Physical Therapy Referral    Gastroesophageal reflux disease without esophagitis  Patient requesting refill for her pantoprazole.  Today we discussed how PPIs can increase risk for nutritional deficiencies (calcium, magnesium) and how it would be preferential for her to be taking famotidine over Protonix.  Patient expresses understanding and agreement and is " interested in trialing Pepcid for her symptoms.  Encourage patient to please let us know if Pepcid does not adequately control her symptoms.  - pantoprazole (PROTONIX) 20 MG EC tablet  Dispense: 90 tablet; Refill: 1  - multivitamin w/minerals (THERA-VIT-M) tablet  Dispense: 90 tablet; Refill: 3  - famotidine (PEPCID) 40 MG tablet  Dispense: 90 tablet; Refill: 3    Mild episode of recurrent major depressive disorder (H)  Patient with a story of the major depressive disorder. PHQ9 of 5; likely falsely elevated 2/2 COVID infection.  Would consider doing a repeat PHQ-9 at next visit as I suspect that this problem is in remission rather than acute episode.    Prediabetes  Patient with a history of prediabetes that appeared to be slowly worsening over the years (5.9 -> 6.1 -> 6.4).  Last A1c was 6.4 2/18/2022.  We will repeat today.    A1c remains stable at 6.5 from lab check.  Called daughter as discussed during appointment.  Recommend recheck in a year.  - Hemoglobin A1c    Preventative health care  Refill requested; filled  - Calcium Carb-Cholecalciferol (OYSTER SHELL CALCIUM) 500-10 MG-MCG TABS  Dispense: 90 tablet; Refill: 3    Neck pain  Refill requested; filled  - diclofenac (VOLTAREN) 1 % topical gel  Dispense: 100 g; Refill: 3    Strain of lumbar region, initial encounter  Refill requested; filled  - Lidocaine (LIDOCARE) 4 % Patch  Dispense: 10 patch; Refill: 1    Dry eyes  Refill requested; filled  - carboxymethylcellulose (REFRESH PLUS) 0.5 % SOLN ophthalmic solution  Dispense: 30 mL; Refill: 3    Return if symptoms worsen or fail to improve.    Cha Mcdowell MD  [facility] NANO Blake is a 83 year old accompanied by her daughter, presenting for the following health issues:  Hospital F/U (Pt states that she is here for hospital follow up and back and leg pain. Has been in pain on and off for a couple years in her leg and back for a few months now and it has been getting  "worse)      HPI     Daughter interprets; filled out interpretor deferral form previously filled per daughter    Feels like COVID is getting better  Still having some difficulty with breathing and cough  But feels like its improving  \"Just a cold\"    Desires refills for medication  PPI  - only takes when she is feeling sick/reflux  - takes maybe every other day or every 2 days        Objective    /79 (BP Location: Left arm, Patient Position: Sitting, Cuff Size: Adult Regular)   Pulse 86   Temp 97.9  F (36.6  C) (Oral)   Resp 12   Ht 1.558 m (5' 1.34\")   Wt 65.8 kg (145 lb)   SpO2 97%   BMI 27.10 kg/m    Body mass index is 27.1 kg/m .  Physical Exam  Constitutional:       General: She is not in acute distress.     Appearance: Normal appearance. She is not ill-appearing, toxic-appearing or diaphoretic.   HENT:      Head: Normocephalic and atraumatic.   Eyes:      Conjunctiva/sclera: Conjunctivae normal.   Cardiovascular:      Rate and Rhythm: Normal rate and regular rhythm.      Heart sounds: Normal heart sounds.   Pulmonary:      Effort: Pulmonary effort is normal. No respiratory distress.      Breath sounds: Normal breath sounds. No stridor. No wheezing.   Abdominal:      General: Bowel sounds are normal.      Palpations: Abdomen is soft.      Tenderness: There is abdominal tenderness (epigastric). There is no guarding or rebound.   Musculoskeletal:      Right shoulder: Decreased range of motion (secondary to pain).      Left shoulder: Normal. Normal range of motion.      Cervical back: Normal range of motion.      Lumbar back: Positive left straight leg raise test. Negative right straight leg raise test.      Comments: Empty can test positive to right; no drop sign b/l   Skin:     General: Skin is warm.   Neurological:      Mental Status: She is alert.   Psychiatric:         Mood and Affect: Mood normal.         Behavior: Behavior normal.         Thought Content: Thought content normal.         " Judgment: Judgment normal.

## 2023-03-17 NOTE — PROGRESS NOTES
Preceptor Attestation:    I discussed the patient with the resident and evaluated the patient in person. I have verified the content of the note, which accurately reflects my assessment of the patient and the plan of care.   Supervising Physician:  Ronaldo Bose MD.

## 2023-03-19 ASSESSMENT — PATIENT HEALTH QUESTIONNAIRE - PHQ9: SUM OF ALL RESPONSES TO PHQ QUESTIONS 1-9: 5

## 2023-03-23 NOTE — PROGRESS NOTES
Vanessa from [facility] states their fax machine was down for a while and is wondering if this for can be re-faxed to same number. Thanks.

## 2023-03-23 NOTE — PROGRESS NOTES
Re faxed Medical report to [facility].    Candi Vera  Care Coordinator  [facility]  Phone:[phone]

## 2023-06-14 ENCOUNTER — OFFICE VISIT (OUTPATIENT)
Dept: FAMILY MEDICINE | Facility: CLINIC | Age: 83
End: 2023-06-14
Payer: COMMERCIAL

## 2023-06-14 VITALS
DIASTOLIC BLOOD PRESSURE: 79 MMHG | OXYGEN SATURATION: 98 % | WEIGHT: 145 LBS | TEMPERATURE: 98.2 F | RESPIRATION RATE: 16 BRPM | HEART RATE: 80 BPM | HEIGHT: 62 IN | SYSTOLIC BLOOD PRESSURE: 129 MMHG | BODY MASS INDEX: 26.68 KG/M2

## 2023-06-14 DIAGNOSIS — M54.16 CHRONIC LUMBAR RADICULOPATHY: ICD-10-CM

## 2023-06-14 DIAGNOSIS — K21.9 GASTROESOPHAGEAL REFLUX DISEASE WITHOUT ESOPHAGITIS: ICD-10-CM

## 2023-06-14 DIAGNOSIS — G89.29 CHRONIC RIGHT SHOULDER PAIN: Primary | ICD-10-CM

## 2023-06-14 DIAGNOSIS — M25.511 CHRONIC RIGHT SHOULDER PAIN: Primary | ICD-10-CM

## 2023-06-14 PROCEDURE — 99214 OFFICE O/P EST MOD 30 MIN: CPT | Mod: GC | Performed by: STUDENT IN AN ORGANIZED HEALTH CARE EDUCATION/TRAINING PROGRAM

## 2023-06-14 RX ORDER — ACETAMINOPHEN 500 MG
1000 TABLET ORAL EVERY 8 HOURS PRN
Qty: 100 TABLET | Refills: 11 | Status: SHIPPED | OUTPATIENT
Start: 2023-06-14 | End: 2023-09-22

## 2023-06-14 RX ORDER — FAMOTIDINE 40 MG/1
40 TABLET, FILM COATED ORAL DAILY
Qty: 90 TABLET | Refills: 3 | Status: SHIPPED | OUTPATIENT
Start: 2023-06-14 | End: 2023-09-22

## 2023-06-14 RX ORDER — MULTIPLE VITAMINS W/ MINERALS TAB 9MG-400MCG
1 TAB ORAL DAILY
Qty: 90 TABLET | Refills: 3 | Status: SHIPPED | OUTPATIENT
Start: 2023-06-14 | End: 2024-01-04

## 2023-06-14 RX ORDER — CARBOXYMETHYLCELLULOSE SODIUM 0.5 G/100ML
SOLUTION/ DROPS OPHTHALMIC
COMMUNITY
Start: 2023-03-17

## 2023-06-14 RX ORDER — MULTIVITAMIN
TABLET ORAL
COMMUNITY
Start: 2023-03-17 | End: 2023-09-22

## 2023-06-14 NOTE — PROGRESS NOTES
"  Assessment & Plan     Chronic right shoulder pain  Chronic lumbar radiculopathy  Differential diagnosis includes frozen shoulder, osteoarthritis, fracture, rotator cuff injury.  Less likely rotator cuff injury given the fact that she has difficulty with both passive and active range of motion.  No point tenderness pointing towards fracture.  However will obtain x-ray of shoulder to assess for fracture as well as arthritis.  If x-ray is unrevealing plan for physical therapy.  We will call daughter with results ([phone])  - X-ray rt Shoulder G/E 3 vw  - acetaminophen (TYLENOL) 500 MG tablet  Dispense: 100 tablet; Refill: 11    Gastroesophageal reflux disease without esophagitis  GERD is well controlled with famotidine.  Patient has stopped PPI.  Will refill today  - famotidine (PEPCID) 40 MG tablet  Dispense: 90 tablet; Refill: 3  - multivitamin w/minerals (THERA-VIT-M) tablet  Dispense: 90 tablet; Refill: 3    Return for Follow up results.    Cha Mcdowell MD  [facility] NANO Blake is a 83 year old, presenting for the following health issues:  Shoulder Pain (For 2 months, Pain scale can get up to 10. )        6/14/2023     4:29 PM   Additional Questions   Roomed by benji   Accompanied by rigoberto, daughter     HPI     Shoulder pain continues  Has not changed  More frequent  Didn't go to physical therapy;             Objective    /79   Pulse 80   Temp 98.2  F (36.8  C) (Oral)   Resp 16   Ht 1.575 m (5' 2\")   Wt 65.8 kg (145 lb)   SpO2 98%   BMI 26.52 kg/m    Body mass index is 26.52 kg/m .  Physical Exam  Vitals reviewed.   Constitutional:       General: She is not in acute distress.     Appearance: Normal appearance. She is not ill-appearing, toxic-appearing or diaphoretic.   HENT:      Head: Normocephalic and atraumatic.   Eyes:      Conjunctiva/sclera: Conjunctivae normal.   Cardiovascular:      Rate and Rhythm: Normal rate.   Pulmonary:      Effort: Pulmonary effort " is normal. No respiratory distress.   Musculoskeletal:      Cervical back: Normal. No rigidity or tenderness. Normal range of motion.      Comments: Passive and active ROM:  R abduction 90  R flexion 90   Limited by pain  + Neers  + Argueta   Neurological:      Mental Status: She is alert.   Psychiatric:         Mood and Affect: Mood normal.         Behavior: Behavior normal.         Thought Content: Thought content normal.         Judgment: Judgment normal.

## 2023-06-14 NOTE — PATIENT INSTRUCTIONS
Patient Education   Here is the plan from today's visit    Let's plan on getting an xray to check your shoulder; if everything looks normal, let's start you on physical therapy.     Follow up plan  Return for Follow up results.    Thank you for coming to [facility] today.  Medication Refills:  If you need any refills please call your pharmacy and they will contact us.   If you need to  your refill at a new pharmacy, please contact the new pharmacy directly. The new pharmacy will help you get your medications transferred faster.   Scheduling:  If you have any concerns about today's visit or wish to schedule another appointment please call our office during normal business hours [phone] (8-5:00 M-F). If you can no longer make a scheduled visit, please cancel via SLID or call us to cancel.   If a referral was made to an [facility] specialty provider and you do not get a call from central scheduling, please refer to directions on your visit summary or call our office during normal business hours for assistance.   If you had an XRay/CT/Ultrasound/MRI ordered the number is [phone] to schedule or change your radiology appointment.   Medical Concerns:  If you have urgent medical concerns please call [phone] at any time of the day.    Cha Mcdowell MD

## 2023-06-22 ENCOUNTER — ANCILLARY PROCEDURE (OUTPATIENT)
Dept: GENERAL RADIOLOGY | Facility: CLINIC | Age: 83
End: 2023-06-22
Attending: FAMILY MEDICINE
Payer: COMMERCIAL

## 2023-06-22 DIAGNOSIS — G89.29 CHRONIC RIGHT SHOULDER PAIN: ICD-10-CM

## 2023-06-22 DIAGNOSIS — M25.511 CHRONIC RIGHT SHOULDER PAIN: ICD-10-CM

## 2023-06-22 PROCEDURE — 73030 X-RAY EXAM OF SHOULDER: CPT | Mod: RT | Performed by: RADIOLOGY

## 2023-06-25 ENCOUNTER — TELEPHONE (OUTPATIENT)
Dept: FAMILY MEDICINE | Facility: CLINIC | Age: 83
End: 2023-06-25
Payer: COMMERCIAL

## 2023-06-25 DIAGNOSIS — G89.29 CHRONIC RIGHT SHOULDER PAIN: ICD-10-CM

## 2023-06-25 DIAGNOSIS — M25.511 CHRONIC RIGHT SHOULDER PAIN: ICD-10-CM

## 2023-06-25 DIAGNOSIS — M75.01 ADHESIVE CAPSULITIS OF RIGHT SHOULDER: Primary | ICD-10-CM

## 2023-06-25 NOTE — TELEPHONE ENCOUNTER
Called daughter Deb with results of XRAY.    Only mild degenerative changes. Suspect adhesive capsulitis given more acute worsening of her chronic pain. Though difficult to fully rule out OA. Will send PT referral.

## 2023-09-22 ENCOUNTER — OFFICE VISIT (OUTPATIENT)
Dept: FAMILY MEDICINE | Facility: CLINIC | Age: 83
End: 2023-09-22
Payer: COMMERCIAL

## 2023-09-22 ENCOUNTER — ANCILLARY PROCEDURE (OUTPATIENT)
Dept: GENERAL RADIOLOGY | Facility: CLINIC | Age: 83
End: 2023-09-22
Attending: FAMILY MEDICINE
Payer: COMMERCIAL

## 2023-09-22 VITALS
RESPIRATION RATE: 17 BRPM | HEIGHT: 62 IN | OXYGEN SATURATION: 99 % | BODY MASS INDEX: 26.68 KG/M2 | DIASTOLIC BLOOD PRESSURE: 80 MMHG | HEART RATE: 68 BPM | SYSTOLIC BLOOD PRESSURE: 145 MMHG | TEMPERATURE: 98.2 F | WEIGHT: 145 LBS

## 2023-09-22 DIAGNOSIS — K21.9 GASTROESOPHAGEAL REFLUX DISEASE WITHOUT ESOPHAGITIS: ICD-10-CM

## 2023-09-22 DIAGNOSIS — Z00.00 PREVENTATIVE HEALTH CARE: ICD-10-CM

## 2023-09-22 DIAGNOSIS — M25.511 CHRONIC RIGHT SHOULDER PAIN: Primary | ICD-10-CM

## 2023-09-22 DIAGNOSIS — M50.30 DDD (DEGENERATIVE DISC DISEASE), CERVICAL: ICD-10-CM

## 2023-09-22 DIAGNOSIS — M79.609 PARESTHESIA AND PAIN OF RIGHT EXTREMITY: ICD-10-CM

## 2023-09-22 DIAGNOSIS — M54.41 CHRONIC LOW BACK PAIN WITH RIGHT-SIDED SCIATICA, UNSPECIFIED BACK PAIN LATERALITY: ICD-10-CM

## 2023-09-22 DIAGNOSIS — G89.29 CHRONIC LOW BACK PAIN WITH RIGHT-SIDED SCIATICA, UNSPECIFIED BACK PAIN LATERALITY: ICD-10-CM

## 2023-09-22 DIAGNOSIS — G89.29 CHRONIC RIGHT SHOULDER PAIN: Primary | ICD-10-CM

## 2023-09-22 DIAGNOSIS — R20.2 PARESTHESIA: ICD-10-CM

## 2023-09-22 DIAGNOSIS — M54.2 NECK PAIN: ICD-10-CM

## 2023-09-22 DIAGNOSIS — R20.2 PARESTHESIA AND PAIN OF RIGHT EXTREMITY: ICD-10-CM

## 2023-09-22 PROCEDURE — 90662 IIV NO PRSV INCREASED AG IM: CPT | Performed by: STUDENT IN AN ORGANIZED HEALTH CARE EDUCATION/TRAINING PROGRAM

## 2023-09-22 PROCEDURE — 99213 OFFICE O/P EST LOW 20 MIN: CPT | Mod: 25 | Performed by: STUDENT IN AN ORGANIZED HEALTH CARE EDUCATION/TRAINING PROGRAM

## 2023-09-22 PROCEDURE — 72050 X-RAY EXAM NECK SPINE 4/5VWS: CPT | Mod: FY | Performed by: RADIOLOGY

## 2023-09-22 PROCEDURE — 90471 IMMUNIZATION ADMIN: CPT | Performed by: STUDENT IN AN ORGANIZED HEALTH CARE EDUCATION/TRAINING PROGRAM

## 2023-09-22 RX ORDER — CHOLECALCIFEROL (VITAMIN D3) 50 MCG
1 TABLET ORAL DAILY
Qty: 90 TABLET | Refills: 1 | Status: SHIPPED | OUTPATIENT
Start: 2023-09-22 | End: 2024-01-04

## 2023-09-22 RX ORDER — FAMOTIDINE 40 MG/1
40 TABLET, FILM COATED ORAL DAILY
Qty: 90 TABLET | Refills: 3 | Status: SHIPPED | OUTPATIENT
Start: 2023-09-22 | End: 2024-04-24

## 2023-09-22 RX ORDER — ACETAMINOPHEN 500 MG
1000 TABLET ORAL EVERY 8 HOURS PRN
Qty: 100 TABLET | Refills: 11 | Status: SHIPPED | OUTPATIENT
Start: 2023-09-22 | End: 2024-04-24

## 2023-09-22 RX ORDER — MULTIVITAMIN
1 TABLET ORAL DAILY
Qty: 90 TABLET | Refills: 1 | Status: SHIPPED | OUTPATIENT
Start: 2023-09-22

## 2023-09-22 RX ORDER — LIDOCAINE 4 G/G
1 PATCH TOPICAL EVERY 24 HOURS
Qty: 10 PATCH | Refills: 1 | Status: SHIPPED | OUTPATIENT
Start: 2023-09-22

## 2023-09-22 ASSESSMENT — PAIN SCALES - GENERAL: PAINLEVEL: EXTREME PAIN (8)

## 2023-09-22 NOTE — PROGRESS NOTES
Assessment & Plan     Chronic right shoulder pain  Neck pain  Paresthesia and pain of right extremity  Possible nerve or tendon impingement. Given limited ROM, adhesive capsulitis is also possible. Examine with XR to rule out impingement given C6 distribution of numbness/tingling over the ulnar side of arm. Call rigoberto with results of xray.  - Lidocaine (LIDOCARE) 4 % Patch; Place 1 patch onto the skin every 24 hours To prevent lidocaine toxicity, patient should be patch free for 12 hrs daily.  - acetaminophen (TYLENOL) 500 MG tablet; Take 2 tablets (1,000 mg) by mouth every 8 hours as needed for mild pain  - diclofenac (VOLTAREN) 1 % topical gel; Use 4x/day on neck.  - XR Cervical Spine G/E 4 Views; Future    Chronic low back pain with right sided sciatica, unspecified back pain laterality  Radiating pain and positive straight leg raise are concerning for sciatic pain in the right side. Refer to PT, treat pain as needed in the meantime.  - Lidocaine (LIDOCARE) 4 % Patch; Place 1 patch onto the skin every 24 hours To prevent lidocaine toxicity, patient should be patch free for 12 hrs daily.  - acetaminophen (TYLENOL) 500 MG tablet; Take 2 tablets (1,000 mg) by mouth every 8 hours as needed for mild pain  - diclofenac (VOLTAREN) 1 % topical gel; Use 4x/day on neck.    Preventative health care  Lou consented to flu vaccine. Refilling vitamins.  - INFLUENZA VACCINE 65+ (FLUZONE HD)  - Multiple Vitamin (MULTIVITAMIN) TABS; Take 1 tablet by mouth daily  - vitamin D3 (CHOLECALCIFEROL) 50 mcg (2000 units) tablet; Take 1 tablet (50 mcg) by mouth daily    Gastroesophageal reflux disease without esophagitis  GERD well controlled on famotidine, Lou no longer needs PPIs for pain. Refilling famotidine.  - famotidine (PEPCID) 40 MG tablet; Take 1 tablet (40 mg) by mouth daily      Nubia Walker MS3    Resident/Fellow Attestation   I, Cha Mcdowell MD, was present with the medical/OMARI student who participated in the service and  "in the documentation of the note.  I have verified the history and personally performed the physical exam and medical decision making.  I agree with the assessment and plan of care as documented in the note.      Cha Mcdowell MD  PGY3          Subjective   Lou is a 83 year old, presenting for the following health issues:  Shoulder Pain  Lou is coming in for 1 month of new pain (in addition to preexisting pain).  The pain is in two regions, the right shoulder/arm/neck, and the right hip/thigh/leg.  In the right arm, it extends from the shoulder (Lou says the back of the shoulder is the most tender point, and the pain all starts from there) and goes all the way down to the hands and fingers, and also radiates into the neck.  The pain in her right leg goes from the hip all the way down to the foot.  Lou describes pain in both regions as tingling.  She reports no inciting event or falls, minor improvement with tylenol, and increased pain/stiffness with exposure to cold.       Objective    BP (!) 145/80   Pulse 68   Temp 98.2  F (36.8  C)   Resp 17   Ht 1.575 m (5' 2\")   Wt 65.8 kg (145 lb)   SpO2 99%   BMI 26.52 kg/m    Body mass index is 26.52 kg/m .  Physical Exam  Constitutional:       Appearance: Normal appearance.   HENT:      Head: Normocephalic.   Eyes:      Conjunctiva/sclera: Conjunctivae normal.   Cardiovascular:      Rate and Rhythm: Normal rate and regular rhythm.      Pulses: Normal pulses.      Heart sounds: Normal heart sounds.   Pulmonary:      Effort: Pulmonary effort is normal.      Breath sounds: Normal breath sounds.   Musculoskeletal:         General: Tenderness present.      Right shoulder: Tenderness present. No swelling or deformity. Decreased range of motion.      Right upper arm: Tenderness present.      Right hip: Tenderness present.      Right upper leg: Tenderness present.      Right lower leg: Tenderness present.      Comments: RUE: Diffuse tenderness to palpation from " shoulder to hands. Tenderness is more pronounced down the lateral arm. Most tender at posterior shoulder. Active flexion limited to ~70 degrees, passive flexion limited to ~90 degrees.   LUE: Minimal tenderness to palpation, active/passive flexion reaches almost 180 degrees. Positive Radha test for posterior hip pain.  RLE: Positive straight leg raise test.   LLE: Negative for straight leg raise test.   Neurological:      General: No focal deficit present.      Mental Status: She is alert.   Psychiatric:         Mood and Affect: Mood normal.         Behavior: Behavior normal.

## 2023-09-22 NOTE — PATIENT INSTRUCTIONS
Patient Education   Here is the plan from today's visit    1. Strain of lumbar region, initial encounter  - Lidocaine (LIDOCARE) 4 % Patch; Place 1 patch onto the skin every 24 hours To prevent lidocaine toxicity, patient should be patch free for 12 hrs daily.  Dispense: 10 patch; Refill: 1  - acetaminophen (TYLENOL) 500 MG tablet; Take 2 tablets (1,000 mg) by mouth every 8 hours as needed for mild pain  Dispense: 100 tablet; Refill: 11  - diclofenac (VOLTAREN) 1 % topical gel; Use 4x/day on neck.  Dispense: 100 g; Refill: 3    2. Chronic lumbar radiculopathy  - Lidocaine (LIDOCARE) 4 % Patch; Place 1 patch onto the skin every 24 hours To prevent lidocaine toxicity, patient should be patch free for 12 hrs daily.  Dispense: 10 patch; Refill: 1  - acetaminophen (TYLENOL) 500 MG tablet; Take 2 tablets (1,000 mg) by mouth every 8 hours as needed for mild pain  Dispense: 100 tablet; Refill: 11  - diclofenac (VOLTAREN) 1 % topical gel; Use 4x/day on neck.  Dispense: 100 g; Refill: 3    3. Neck pain  - Lidocaine (LIDOCARE) 4 % Patch; Place 1 patch onto the skin every 24 hours To prevent lidocaine toxicity, patient should be patch free for 12 hrs daily.  Dispense: 10 patch; Refill: 1  - acetaminophen (TYLENOL) 500 MG tablet; Take 2 tablets (1,000 mg) by mouth every 8 hours as needed for mild pain  Dispense: 100 tablet; Refill: 11  - diclofenac (VOLTAREN) 1 % topical gel; Use 4x/day on neck.  Dispense: 100 g; Refill: 3    4. Preventative health care  - INFLUENZA VACCINE 65+ (FLUZONE HD)  - Multiple Vitamin (MULTIVITAMIN) TABS; Take 1 tablet by mouth daily  Dispense: 90 tablet; Refill: 1  - vitamin D3 (CHOLECALCIFEROL) 50 mcg (2000 units) tablet; Take 1 tablet (50 mcg) by mouth daily  Dispense: 90 tablet; Refill: 1    5. Gastroesophageal reflux disease without esophagitis  - famotidine (PEPCID) 40 MG tablet; Take 1 tablet (40 mg) by mouth daily  Dispense: 90 tablet; Refill: 3      Follow up plan  Return if symptoms worsen or  fail to improve.    Thank you for coming to [facility] today.  Medication Refills:  If you need any refills please call your pharmacy and they will contact us.   If you need to  your refill at a new pharmacy, please contact the new pharmacy directly. The new pharmacy will help you get your medications transferred faster.   Scheduling:  If you have any concerns about today's visit or wish to schedule another appointment please call our office during normal business hours [phone] (8-5:00 M-F). If you can no longer make a scheduled visit, please cancel via [employer] or call us to cancel.   Medical Concerns:  If you have urgent medical concerns please call [phone] at any time of the day.    Cha Mcdowell MD

## 2024-01-04 ENCOUNTER — OFFICE VISIT (OUTPATIENT)
Dept: FAMILY MEDICINE | Facility: CLINIC | Age: 84
End: 2024-01-04
Payer: COMMERCIAL

## 2024-01-04 VITALS
SYSTOLIC BLOOD PRESSURE: 121 MMHG | RESPIRATION RATE: 14 BRPM | WEIGHT: 146.8 LBS | OXYGEN SATURATION: 99 % | HEART RATE: 86 BPM | DIASTOLIC BLOOD PRESSURE: 79 MMHG | BODY MASS INDEX: 26.85 KG/M2

## 2024-01-04 DIAGNOSIS — Z00.00 PREVENTATIVE HEALTH CARE: ICD-10-CM

## 2024-01-04 DIAGNOSIS — Z78.9 NON-ENGLISH SPEAKING PATIENT: ICD-10-CM

## 2024-01-04 DIAGNOSIS — M54.12 CERVICAL RADICULOPATHY: ICD-10-CM

## 2024-01-04 DIAGNOSIS — M54.41 CHRONIC RIGHT-SIDED LOW BACK PAIN WITH RIGHT-SIDED SCIATICA: Primary | ICD-10-CM

## 2024-01-04 DIAGNOSIS — G89.29 CHRONIC RIGHT-SIDED LOW BACK PAIN WITH RIGHT-SIDED SCIATICA: Primary | ICD-10-CM

## 2024-01-04 DIAGNOSIS — Z02.89 ENCOUNTER FOR COMPLETION OF FORM WITH PATIENT: ICD-10-CM

## 2024-01-04 PROCEDURE — 99213 OFFICE O/P EST LOW 20 MIN: CPT | Mod: GC | Performed by: STUDENT IN AN ORGANIZED HEALTH CARE EDUCATION/TRAINING PROGRAM

## 2024-01-04 RX ORDER — CHOLECALCIFEROL (VITAMIN D3) 50 MCG
1 TABLET ORAL DAILY
Qty: 90 TABLET | Refills: 3 | Status: SHIPPED | OUTPATIENT
Start: 2024-01-04

## 2024-01-04 RX ORDER — MULTIVITAMIN
1 TABLET ORAL DAILY
Qty: 90 TABLET | Refills: 1 | Status: CANCELLED | OUTPATIENT
Start: 2024-01-04

## 2024-01-04 RX ORDER — MULTIPLE VITAMINS W/ MINERALS TAB 9MG-400MCG
1 TAB ORAL DAILY
Qty: 90 TABLET | Refills: 3 | Status: SHIPPED | OUTPATIENT
Start: 2024-01-04

## 2024-01-04 ASSESSMENT — PATIENT HEALTH QUESTIONNAIRE - PHQ9: SUM OF ALL RESPONSES TO PHQ QUESTIONS 1-9: 2

## 2024-01-04 NOTE — PATIENT INSTRUCTIONS
Lou, for your back pain that goes down your legs and your neck pain that goes down your arms, I need you to go to physical therapy and give that a try for a couple of weeks. These symptoms are consistent with sciatica (the back and legs) and cervical radiculopathy (a pinched nerve of the neck down the arms). I saw you had an appointment in October, but forgot and then no other appointments were made (I have a feeling they were planning on making the follow up appointments at the first one). So, we will start over with you making your first appointment again. Here are the numbers: please call [phone] for [facility], [phone] for [facility] and [phone] for [facility].    Please enlist your children, your phone, or whatever other methods to help you remember the appointment! If you do not get better after a couple weeks of physical therapy, then we look at other options. So, I want to see you again in 6-8 weeks!    Also, for your blood pressure, I want you to have your adult day care center write down what the numbers are for your blood pressure. With those number, I will decide if we need to start a medication or do a closer monitoring with 24 hour blood pressure monitoring. I will see you in 3-4 weeks for that!

## 2024-01-04 NOTE — PROGRESS NOTES
Assessment & Plan     Chronic right-sided low back pain with right-sided sciatica  Cervical radiculopathy  Today we discussed my clinical suspicion of sciatica as the cause of her low back pain and leg pain.  We also discussed my concern for a cervical radiculopathy that is originating at the neck base of the neck x-ray that we did last visit.  We discussed that may initial recommendation is physical therapy.  We discussed the depth why she was unable to PT.  Based off her conversation, I suspect that she had forgotten her first appointment, and due to her difficulty navigating the healthcare system due to language barrier among others, it was difficult for her to reschedule these appointments.  Today our plan is to provide numbers to reschedule PT, as well as engaging her children to help with rescheduling as well as remembering to go to the appointments.  However, she continues to have difficulty with scheduling and going to physical therapy we will plan to discuss at her next appointment and possibly pivot or augment with OMT here at [facility].  If she still has difficulty with improvement of her symptoms, would consider possibility of injections and referral.    Reported elevated blood pressure  Patient states that her adult  have been taking her blood pressure.  She is not sure what the blood pressure numbers are, but she states that they are elevated.  Unfortunately without these numbers, is difficult for me to know what the next step is.  Asked patient to the Center write down her numbers, and bring them back to me in 3 to 4 weeks.  At that time, depending on numbers would consider either medication and blood work or 24-hour ambulatory monitoring.    Non-English speaking patient  Patient requires an  to complete the visit or speaks English as a second language.  Not speaking the primary language of the healthcare system is a social determinant of health that impacts how they interact with  the medical system.    Encounter for completion of form with patient  Adult Day Care Form: For future reference: dietary preference of wheat over white, but otherwise no large considerations; medication regimen she does not need assistance for; she may engage in structured exercise program.     Preventative health care  - multivitamin w/minerals (THERA-VIT-M) tablet  Dispense: 90 tablet; Refill: 3  - vitamin D3 (CHOLECALCIFEROL) 50 mcg (2000 units) tablet  Dispense: 90 tablet; Refill: 3        Return in about 4 weeks (around 2/1/2024) for Follow up BP concerns + progress with PT.    Cha Mcdowell MD  [facility] NANO Blake is a 84 year old, presenting for the following health issues:  RECHECK (Back pain)        1/4/2024     9:09 AM   Additional Questions   Roomed by garland   Accompanied by self       HPI   - Has not seen physical therapist, made an appointment, and after that when she called them they didn't answer the phone  - Made an appointment but forgot  - still having arm, neck, back pain    Needs adult day care form filled today    Adult day care noting that her blood pressures are high, which makes her worried because her head hurts    Objective    /79 (BP Location: Left arm, Patient Position: Sitting, Cuff Size: Adult Large)   Pulse 86   Resp 14   Wt 66.6 kg (146 lb 12.8 oz)   SpO2 99%   BMI 26.85 kg/m    Body mass index is 26.85 kg/m .  Physical Exam  Constitutional:       General: She is not in acute distress.     Appearance: Normal appearance. She is not ill-appearing, toxic-appearing or diaphoretic.   HENT:      Head: Normocephalic and atraumatic.   Eyes:      Conjunctiva/sclera: Conjunctivae normal.   Cardiovascular:      Rate and Rhythm: Normal rate.   Pulmonary:      Effort: Pulmonary effort is normal. No respiratory distress.   Neurological:      Mental Status: She is alert.   Psychiatric:         Mood and Affect: Mood normal.         Behavior: Behavior  normal.         Thought Content: Thought content normal.         Judgment: Judgment normal.

## 2024-01-04 NOTE — PROGRESS NOTES
Preceptor Attestation:    I discussed the patient with the resident and evaluated the patient in person. I have verified the content of the note, which accurately reflects my assessment of the patient and the plan of care.   Supervising Physician:  Emilie Harvey MD.

## 2024-01-26 ENCOUNTER — DOCUMENTATION ONLY (OUTPATIENT)
Dept: FAMILY MEDICINE | Facility: CLINIC | Age: 84
End: 2024-01-26
Payer: COMMERCIAL

## 2024-01-26 NOTE — PROGRESS NOTES
"When opening a documentation only encounter, be sure to enter in \"Chief Complaint\" Forms and in \" Comments\" Title of form, description if needed.    Lou is a 84 year old  female  Form received via: Fax  Form now resides in: Provider Ready    Yulia Lai MA        Form has been completed by provider.     Form sent out via: Fax to [facility] at Fax Number: [phone]  Patient informed: N/A  Output date: January 30, 2024    Fady Brown MA      **Please close the encounter**        "

## 2024-02-21 ENCOUNTER — THERAPY VISIT (OUTPATIENT)
Dept: PHYSICAL THERAPY | Facility: CLINIC | Age: 84
End: 2024-02-21
Attending: FAMILY MEDICINE
Payer: COMMERCIAL

## 2024-02-21 DIAGNOSIS — M50.30 DDD (DEGENERATIVE DISC DISEASE), CERVICAL: ICD-10-CM

## 2024-02-21 DIAGNOSIS — G89.29 CHRONIC LOW BACK PAIN WITH RIGHT-SIDED SCIATICA, UNSPECIFIED BACK PAIN LATERALITY: ICD-10-CM

## 2024-02-21 DIAGNOSIS — M54.2 NECK PAIN: Primary | ICD-10-CM

## 2024-02-21 DIAGNOSIS — M54.41 CHRONIC LOW BACK PAIN WITH RIGHT-SIDED SCIATICA, UNSPECIFIED BACK PAIN LATERALITY: ICD-10-CM

## 2024-02-21 DIAGNOSIS — M79.609 PARESTHESIA AND PAIN OF RIGHT EXTREMITY: ICD-10-CM

## 2024-02-21 DIAGNOSIS — M25.511 CHRONIC RIGHT SHOULDER PAIN: ICD-10-CM

## 2024-02-21 DIAGNOSIS — G89.29 CHRONIC RIGHT SHOULDER PAIN: ICD-10-CM

## 2024-02-21 DIAGNOSIS — R20.2 PARESTHESIA AND PAIN OF RIGHT EXTREMITY: ICD-10-CM

## 2024-02-21 PROCEDURE — 97161 PT EVAL LOW COMPLEX 20 MIN: CPT | Mod: GP

## 2024-02-21 PROCEDURE — 97112 NEUROMUSCULAR REEDUCATION: CPT | Mod: GP

## 2024-02-21 PROCEDURE — 97110 THERAPEUTIC EXERCISES: CPT | Mod: GP

## 2024-02-21 NOTE — PROGRESS NOTES
PHYSICAL THERAPY EVALUATION  Type of Visit: Evaluation    See electronic medical record for Abuse and Falls Screening details.    Subjective       Presenting condition or subjective complaint:    Pt is complaining of chronic neck, right shoulder, and back pain.  Currently her right shoulder pain is the biggest complaint.  She has had neck and shoulder pain for about 1 year, and has had back pain for as long as she can remember.  She was having pain on just the right side of her neck but not it wrap around.  She has pain all the way down her right arm.  She takes OTC analgesics to manage.  She feels like nerves in her back tense up and it goes down her right leg.    Date of onset: 02/21/23 (neck pain, back pain is chronic (years))    Relevant medical history:     Dates & types of surgery:      Prior diagnostic imaging/testing results:     XR  Prior therapy history for the same diagnosis, illness or injury:        Prior Level of Function  Transfers: Independent  Ambulation: Independent  ADL: Independent  IADL:  IND    Living Environment  Social support:     Type of home:   apartment  Stairs to enter the home:       elevator, but it goes down sometimes.  They are on 18th floor.  Ramp:     Stairs inside the home:         Help at home:   lives with daughter  Equipment owned:   SPC, used to use but  is too weak now    Employment:     none  Hobbies/Interests:   walking, spending time with family; hard to enjoy due to pain    Patient goals for therapy:   to feel relief from her pain and be able to do things that she wants to do    Pain assessment: 10/10 at worst in the right side of neck and in to shoulder     Objective      Cognitive Status Examination  Orientation: Oriented to person, place and time   Level of Consciousness: Alert  Follows Commands and Answers Questions: 100% of the time  Personal Safety and Judgement: Intact  Memory: Intact    OBSERVATION: Pleasant female in NAD.  INTEGUMENTARY:  not observed due to  traditional attire  POSTURE: Sitting Posture: Rounded shoulders, Forward head, Thoracic kyphosis increased  PALPATION: TTP of upper traps, levator scap, occipitals, R deltoids  RANGE OF MOTION:   (Degrees) Left AROM Right AROM    Cervical Flexion 75%    Cervical Extension 25%    Cervical Side bend 75% 50%*    Cervical Rotation 100% 75%     Left AROM Left PROM Right AROM Right PROM   Shoulder Flexion       Shoulder Extension/IR       Shoulder Abduction       Shoulder ER       Pain:   End Feel:   STRENGTH:   Pain: - none + mild ++ moderate +++ severe  Strength Scale: 0-5/5 Left Right   Shoulder Flexion 4 3+   Shoulder Abduction 4 3+   Shoulder Internal Rotation 4+ 3+   Shoulder External Rotation 4+ 3+   Elbow Flexion 4+ 4   Elbow Extension 4+ 4     TRANSFERS: Independent    GAIT:   Level of [place]: Independent  Assistive Device(s): None  Gait Deviations: Antalgic  Base of support increased  Stride length decreased  Guerline decreased  Gait Distance: 150 ft  Stairs: NT      Assessment & Plan   CLINICAL IMPRESSIONS  Medical Diagnosis: Chronic right shoulder pain (M25.511, G89.29), Chronic low back pain with right-sided sciatica, unspecified back pain laterality (M54.41, G89.29), Neck pain (M54.2), Paresthesia and pain of right extremity (M79.609, R20.2), DDD (degenerative disc disease), cervical (M50.30)    Treatment Diagnosis: Neck, shoulder, and back pain with impaired muscle performance   Impression/Assessment: Patient is a 84 year old female with right-sided neck/shoulder and low back complaints.  The following significant findings have been identified: Pain, Decreased ROM/flexibility, Decreased joint mobility, Decreased strength, Impaired gait, Impaired muscle performance, Decreased activity tolerance, and Impaired posture. These impairments interfere with their ability to perform self care tasks, household chores, household mobility, and community mobility as compared to previous level of function.      Clinical Decision Making (Complexity):  Clinical Presentation: Stable/Uncomplicated  Clinical Presentation Rationale: based on medical and personal factors listed in PT evaluation  Clinical Decision Making (Complexity): Low complexity    PLAN OF CARE  Treatment Interventions:  Interventions: Gait Training, Manual Therapy, Neuromuscular Re-education, Therapeutic Activity, Therapeutic Exercise    Long Term Goals     PT Goal 1  Goal Identifier: Shoulder ROM  Goal Description: Pt will be able to reach overhead with 2 lbs without pain in order to grasp objects in a cabinet for improved function and QOL.  Goal Progress: ongoing  Target Date: 05/20/24  PT Goal 2  Goal Identifier: Walking  Goal Description: Pt will be able to walk for 20 minutes without pain to improve mobility and QOL.  Goal Progress: ongoing  Target Date: 05/20/24      Frequency of Treatment: 1x/week  Duration of Treatment: 90 days    Recommended Referrals to Other Professionals:   Education Assessment:   Learner/Method: Patient;Family;Demonstration;Pictures/Video  Education Comments: HEP, POC    Risks and benefits of evaluation/treatment have been explained.   Patient/Family/caregiver agrees with Plan of Care.     Evaluation Time:     PT Eval, Low Complexity Minutes (32601): 18   Present: Yes: Language: Oromo, ID Number/Identifier: daughter     Signing Clinician: Yonny Johnson, PT      [facility]                                                                                   OUTPATIENT PHYSICAL THERAPY      PLAN OF TREATMENT FOR OUTPATIENT REHABILITATION   Patient's Last Name, First Name, Lou Amezcua YOB: 1940   Provider's Name   [facility]   Medical Record No.  [id number]     Onset Date: 02/21/23 (neck pain, back pain is chronic (years))  Start of Care Date: 02/21/24     Medical Diagnosis:  Chronic right shoulder pain (M25.511, G89.29), Chronic low  back pain with right-sided sciatica, unspecified back pain laterality (M54.41, G89.29), Neck pain (M54.2), Paresthesia and pain of right extremity (M79.609, R20.2), DDD (degenerative disc disease), cervical (M50.30)      PT Treatment Diagnosis:  Neck, shoulder, and back pain with impaired muscle performance Plan of Treatment  Frequency/Duration: 1x/week/ 90 days    Certification date from 02/21/24 to 05/20/24         See note for plan of treatment details and functional goals     Yonny Johnson, PT                         I CERTIFY THE NEED FOR THESE SERVICES FURNISHED UNDER        THIS PLAN OF TREATMENT AND WHILE UNDER MY CARE     (Physician attestation of this document indicates review and certification of the therapy plan).              Referring Provider:  Mya Fay    Initial Assessment  See Epic Evaluation- Start of Care Date: 02/21/24

## 2024-03-22 ENCOUNTER — THERAPY VISIT (OUTPATIENT)
Dept: PHYSICAL THERAPY | Facility: CLINIC | Age: 84
End: 2024-03-22
Payer: COMMERCIAL

## 2024-03-22 DIAGNOSIS — G89.29 CHRONIC RIGHT SHOULDER PAIN: Primary | ICD-10-CM

## 2024-03-22 DIAGNOSIS — R20.2 PARESTHESIA AND PAIN OF RIGHT EXTREMITY: ICD-10-CM

## 2024-03-22 DIAGNOSIS — M54.41 CHRONIC LOW BACK PAIN WITH RIGHT-SIDED SCIATICA, UNSPECIFIED BACK PAIN LATERALITY: ICD-10-CM

## 2024-03-22 DIAGNOSIS — M54.2 NECK PAIN: ICD-10-CM

## 2024-03-22 DIAGNOSIS — M79.609 PARESTHESIA AND PAIN OF RIGHT EXTREMITY: ICD-10-CM

## 2024-03-22 DIAGNOSIS — M25.511 CHRONIC RIGHT SHOULDER PAIN: Primary | ICD-10-CM

## 2024-03-22 DIAGNOSIS — G89.29 CHRONIC LOW BACK PAIN WITH RIGHT-SIDED SCIATICA, UNSPECIFIED BACK PAIN LATERALITY: ICD-10-CM

## 2024-03-22 DIAGNOSIS — M50.30 DDD (DEGENERATIVE DISC DISEASE), CERVICAL: ICD-10-CM

## 2024-03-22 PROCEDURE — 97112 NEUROMUSCULAR REEDUCATION: CPT | Mod: GP

## 2024-03-22 PROCEDURE — 97110 THERAPEUTIC EXERCISES: CPT | Mod: GP

## 2024-04-12 ENCOUNTER — THERAPY VISIT (OUTPATIENT)
Dept: PHYSICAL THERAPY | Facility: CLINIC | Age: 84
End: 2024-04-12
Payer: COMMERCIAL

## 2024-04-12 DIAGNOSIS — M25.511 CHRONIC RIGHT SHOULDER PAIN: Primary | ICD-10-CM

## 2024-04-12 DIAGNOSIS — G89.29 CHRONIC RIGHT SHOULDER PAIN: Primary | ICD-10-CM

## 2024-04-12 PROCEDURE — 97112 NEUROMUSCULAR REEDUCATION: CPT | Mod: GP

## 2024-04-12 PROCEDURE — 97140 MANUAL THERAPY 1/> REGIONS: CPT | Mod: GP

## 2024-04-24 ENCOUNTER — OFFICE VISIT (OUTPATIENT)
Dept: FAMILY MEDICINE | Facility: CLINIC | Age: 84
End: 2024-04-24
Payer: COMMERCIAL

## 2024-04-24 VITALS
HEART RATE: 76 BPM | TEMPERATURE: 98.3 F | WEIGHT: 145 LBS | DIASTOLIC BLOOD PRESSURE: 78 MMHG | OXYGEN SATURATION: 98 % | RESPIRATION RATE: 14 BRPM | BODY MASS INDEX: 26.68 KG/M2 | HEIGHT: 62 IN | SYSTOLIC BLOOD PRESSURE: 126 MMHG

## 2024-04-24 DIAGNOSIS — M54.2 NECK PAIN: ICD-10-CM

## 2024-04-24 DIAGNOSIS — F33.1 MAJOR DEPRESSIVE DISORDER, RECURRENT EPISODE, MODERATE (H): Primary | ICD-10-CM

## 2024-04-24 DIAGNOSIS — M25.511 CHRONIC RIGHT SHOULDER PAIN: ICD-10-CM

## 2024-04-24 DIAGNOSIS — H04.123 DRY EYES: ICD-10-CM

## 2024-04-24 DIAGNOSIS — K21.9 GASTROESOPHAGEAL REFLUX DISEASE WITHOUT ESOPHAGITIS: ICD-10-CM

## 2024-04-24 DIAGNOSIS — G89.29 CHRONIC LOW BACK PAIN WITH RIGHT-SIDED SCIATICA, UNSPECIFIED BACK PAIN LATERALITY: ICD-10-CM

## 2024-04-24 DIAGNOSIS — R06.83 SNORING: ICD-10-CM

## 2024-04-24 DIAGNOSIS — M54.41 CHRONIC LOW BACK PAIN WITH RIGHT-SIDED SCIATICA, UNSPECIFIED BACK PAIN LATERALITY: ICD-10-CM

## 2024-04-24 DIAGNOSIS — G89.29 CHRONIC RIGHT SHOULDER PAIN: ICD-10-CM

## 2024-04-24 DIAGNOSIS — R40.0 DAYTIME SLEEPINESS: ICD-10-CM

## 2024-04-24 LAB — HBA1C MFR BLD: 6.4 % (ref 0–5.6)

## 2024-04-24 PROCEDURE — 99214 OFFICE O/P EST MOD 30 MIN: CPT | Mod: GC | Performed by: STUDENT IN AN ORGANIZED HEALTH CARE EDUCATION/TRAINING PROGRAM

## 2024-04-24 PROCEDURE — 36415 COLL VENOUS BLD VENIPUNCTURE: CPT | Performed by: STUDENT IN AN ORGANIZED HEALTH CARE EDUCATION/TRAINING PROGRAM

## 2024-04-24 PROCEDURE — 83036 HEMOGLOBIN GLYCOSYLATED A1C: CPT | Performed by: STUDENT IN AN ORGANIZED HEALTH CARE EDUCATION/TRAINING PROGRAM

## 2024-04-24 RX ORDER — CARBOXYMETHYLCELLULOSE SODIUM 5 MG/ML
1 SOLUTION/ DROPS OPHTHALMIC 3 TIMES DAILY PRN
Qty: 30 ML | Refills: 3 | Status: SHIPPED | OUTPATIENT
Start: 2024-04-24

## 2024-04-24 RX ORDER — ACETAMINOPHEN 500 MG
1000 TABLET ORAL EVERY 8 HOURS PRN
Qty: 100 TABLET | Refills: 11 | Status: SHIPPED | OUTPATIENT
Start: 2024-04-24

## 2024-04-24 NOTE — PROGRESS NOTES
"  Assessment & Plan     Gastroesophageal reflux disease without esophagitis  Prilosec not controlling symptoms. Escalated to omeprazole twice daily, 20 mg with instructions to move up to 40mg twice daily if still uncontrolled. If not controlled with further escalation, would send to GI for management/endoscopy.   - omeprazole (PRILOSEC) 20 MG DR capsule  Dispense: 240 capsule; Refill: 2    Daytime sleepiness  Snoring  Major depressive disorder, recurrent episode, moderate (H)  Symptoms and HPI suggest MARY likely, will refer for sleep study. Would likely be a good candidate for CPAP therapy with stop-bang score of 4 if sleep study indicates. Fatigue is not correlated to any activity, no associated shortness of breath or swelling - less likely heart failure. Will collect TSH with history of subclinical hypothyroidism 3 years ago and mood effect with fatigue.  - Adult Sleep Eval & Management Referral  - Hemoglobin A1c      Chronic right shoulder pain  Refills requested today. Condition stable. Provided.     - acetaminophen (TYLENOL) 500 MG tablet  Dispense: 100 tablet; Refill: 11    Chronic low back pain with right-sided sciatica, unspecified back pain laterality  Refills requested today. Condition stable. Provided.     - acetaminophen (TYLENOL) 500 MG tablet  Dispense: 100 tablet; Refill: 11    Neck pain  Refills requested today. Condition stable. Provided.     - acetaminophen (TYLENOL) 500 MG tablet  Dispense: 100 tablet; Refill: 11    Dry eyes  Refills requested today. Condition stable. Provided.     - carboxymethylcellulose (REFRESH PLUS) 0.5 % SOLN ophthalmic solution  Dispense: 30 mL; Refill: 3          BMI  Estimated body mass index is 26.52 kg/m  as calculated from the following:    Height as of this encounter: 1.575 m (5' 2\").    Weight as of this encounter: 65.8 kg (145 lb).   Not discussed this visit.          No follow-ups on file.    Romeo Blake is a 84 year old, presenting for the following health " "issues:  Follow Up (Pt wants to test for diabetes ,pt also has some heart burn.)    HPI     Daughter Deb served as     More fatigued  - all throughout the day, at night too  - wakes up tired  - snores at night  - stops breathing at night  - feeling down because she's tired  - neck 38 cm      GERD  - worse  - pepcid isn't working  - triggers include oily foods  - all the way from her throat to her stomach     Tylenol  - takes for neck and back pain, works well    STOP BANG    1. Snoring: Do you snore loudly (louder than talking or loud enough to be heard through closed doors)?    Yes  2. Tired: Do you often feel tired, fatigued, or sleepy during daytime?   yes    3. Observed: Has anyone observed you stop breathing during your sleep?   yes    4. Blood pressure: Do you have or are you being treated for high blood pressure?   yes    5. BMI: BMI more than 35 kg/m2?   no    6. Age: Age over 50 yr old?   yes    7. Neck circumference: Neck circumference greater than 40 cm?   no    8. Gender: Gender male?       STOP-BANG Total Score:  4                Objective    /78   Pulse 76   Temp 98.3  F (36.8  C)   Resp 14   Ht 1.575 m (5' 2\")   Wt 65.8 kg (145 lb)   SpO2 98%   BMI 26.52 kg/m    Body mass index is 26.52 kg/m .  Physical Exam   Constitutional: No acute distress, sitting comfortably  Eyes: EOMI, no eye discharge, no icterus, injection or swelling.  Ears, nose, mouth, throat: Normocephalic, no nasal drainage, speaks clearly, no lip cracking.   Neck: Normal range of motion  CV: Extremeties well perfused  Respiratory: No audible wheezing, strido, cough, or other respiratory distress. Speaking in full sentences.  GI: Nondistended abdomen  MSK: Normal digits, moving extremeties well, symmetric strength visible throughout.,  neck circumference 38cm  Skin: No visible rashes, bruising or other skin lesions.   Neuro: AOx3  Psych: Cooperative, mood, thought and judgement appropriate to situation.      "     Signed Electronically by: Shannon Kellogg DO

## 2024-04-24 NOTE — LETTER
April 24, 2024      Loulaxmi Winston  [address]  [facility] [address]        Dear ,    We are writing to inform you of your test results.    Your A1C falls into the level of Pre-Diabetes, though at your age this is a result that I would not do anything about. No medications. It's always a good idea to watch what you are eating and drinking and the sugar content in each. We tolerate a higher level of blood sugar in our older folks.     Resulted Orders   Hemoglobin A1c   Result Value Ref Range    Hemoglobin A1C 6.4 (H) 0.0 - 5.6 %      Comment:      Normal <5.7%   Prediabetes 5.7-6.4%    Diabetes 6.5% or higher     Note: Adopted from ADA consensus guidelines.       If you have any questions or concerns, please call the clinic at the number listed above.       Sincerely,      Alexa Biggs MD

## 2024-04-24 NOTE — PATIENT INSTRUCTIONS
It was great to see you today! Thanks for coming to [facility]!      Here is the plan from today's visit    You'll get a call in the next two days for a sleep study  Take omeprazole twice daily, ok to go up to 40mg (two pills) twice daily if not controlling symptoms. Return in 2 months if not controlled.       Thank you for coming to [facility] today.  Lab Testing:  **If you had lab testing today and your results are reassuring or normal they will be mailed to you or sent through [facility] within 7 days.   **If the lab tests need quick action we will call you with the results.  **If you are having labs done on a different day, please call [phone] to schedule at [facility] or [phone] for other [facility]patient [facility] locations. Labs do not offer walk-in appointments.  The phone number we will call with results is # [phone] (home) . If this is not the best number please call our clinic and change the number.    Medication Refills:  If you need any refills please call your pharmacy and they will contact us.   If you need to  your refill at a new pharmacy, please contact the new pharmacy directly. The new pharmacy will help you get your medications transferred faster.       Scheduling, Urgent Medical Concerns (24 hours a day!), or ultrasound [phone] (8-5:00 M-F)    Referrals: If a referral was made to an [facility] specialty provider and you do not get a call from central scheduling, please refer to directions on your visit summary or call our office during normal business hours for assistance.     If a Mammogram was ordered for you at the Breast Center call [phone] to schedule or change your appointment.  If you had an XRay/CT/Ultrasound/MRI ordered the number is [phone] to schedule or change your radiology appointment.       Shannon Kellogg, DO  Pronouns: she/her/hers  Resident Physician  [facility] - [facility]/ [facility]     Department of Family Medicine and Community [facility]

## 2024-08-09 ENCOUNTER — OFFICE VISIT (OUTPATIENT)
Dept: FAMILY MEDICINE | Facility: CLINIC | Age: 84
End: 2024-08-09
Payer: COMMERCIAL

## 2024-08-09 VITALS
HEIGHT: 61 IN | HEART RATE: 72 BPM | DIASTOLIC BLOOD PRESSURE: 76 MMHG | WEIGHT: 144 LBS | TEMPERATURE: 98.7 F | OXYGEN SATURATION: 99 % | BODY MASS INDEX: 27.19 KG/M2 | RESPIRATION RATE: 15 BRPM | SYSTOLIC BLOOD PRESSURE: 118 MMHG

## 2024-08-09 DIAGNOSIS — G89.29 CHRONIC LOW BACK PAIN WITH RIGHT-SIDED SCIATICA, UNSPECIFIED BACK PAIN LATERALITY: ICD-10-CM

## 2024-08-09 DIAGNOSIS — M62.830 BACK MUSCLE SPASM: Primary | ICD-10-CM

## 2024-08-09 DIAGNOSIS — K21.9 GASTROESOPHAGEAL REFLUX DISEASE WITHOUT ESOPHAGITIS: ICD-10-CM

## 2024-08-09 DIAGNOSIS — G89.29 CHRONIC RIGHT SHOULDER PAIN: ICD-10-CM

## 2024-08-09 DIAGNOSIS — M25.511 CHRONIC RIGHT SHOULDER PAIN: ICD-10-CM

## 2024-08-09 DIAGNOSIS — M54.2 NECK PAIN: ICD-10-CM

## 2024-08-09 DIAGNOSIS — M54.41 CHRONIC LOW BACK PAIN WITH RIGHT-SIDED SCIATICA, UNSPECIFIED BACK PAIN LATERALITY: ICD-10-CM

## 2024-08-09 PROCEDURE — 99214 OFFICE O/P EST MOD 30 MIN: CPT | Mod: GC

## 2024-08-09 RX ORDER — MULTIVITAMIN
1 TABLET ORAL DAILY
Qty: 90 TABLET | Refills: 1 | Status: CANCELLED | OUTPATIENT
Start: 2024-08-09

## 2024-08-09 RX ORDER — CALCIUM CARBONATE/VITAMIN D3 500 MG-10
1 TABLET ORAL DAILY
Qty: 90 TABLET | Refills: 3 | Status: CANCELLED | OUTPATIENT
Start: 2024-08-09

## 2024-08-09 RX ORDER — LIDOCAINE 4 G/G
1 PATCH TOPICAL EVERY 24 HOURS
Qty: 10 PATCH | Refills: 1 | Status: CANCELLED | OUTPATIENT
Start: 2024-08-09

## 2024-08-09 RX ORDER — FAMOTIDINE 40 MG/1
40 TABLET, FILM COATED ORAL 2 TIMES DAILY
Qty: 240 TABLET | Refills: 1 | Status: SHIPPED | OUTPATIENT
Start: 2024-08-09

## 2024-08-09 RX ORDER — MULTIPLE VITAMINS W/ MINERALS TAB 9MG-400MCG
1 TAB ORAL DAILY
Qty: 90 TABLET | Refills: 3 | Status: CANCELLED | OUTPATIENT
Start: 2024-08-09

## 2024-08-09 RX ORDER — CHOLECALCIFEROL (VITAMIN D3) 50 MCG
1 TABLET ORAL DAILY
Qty: 90 TABLET | Refills: 3 | Status: CANCELLED | OUTPATIENT
Start: 2024-08-09

## 2024-08-09 RX ORDER — CARBOXYMETHYLCELLULOSE SODIUM 5 MG/ML
1 SOLUTION/ DROPS OPHTHALMIC 3 TIMES DAILY PRN
Qty: 30 ML | Refills: 3 | Status: CANCELLED | OUTPATIENT
Start: 2024-08-09

## 2024-08-09 RX ORDER — ACETAMINOPHEN 500 MG
1000 TABLET ORAL EVERY 8 HOURS PRN
Qty: 100 TABLET | Refills: 11 | Status: CANCELLED | OUTPATIENT
Start: 2024-08-09

## 2024-08-09 ASSESSMENT — PATIENT HEALTH QUESTIONNAIRE - PHQ9
SUM OF ALL RESPONSES TO PHQ QUESTIONS 1-9: 23
10. IF YOU CHECKED OFF ANY PROBLEMS, HOW DIFFICULT HAVE THESE PROBLEMS MADE IT FOR YOU TO DO YOUR WORK, TAKE CARE OF THINGS AT HOME, OR GET ALONG WITH OTHER PEOPLE: VERY DIFFICULT
SUM OF ALL RESPONSES TO PHQ QUESTIONS 1-9: 23

## 2024-08-09 NOTE — PROGRESS NOTES
Preceptor Attestation:   I discussed the patient with the resident. I have verified the content of the note, which accurately reflects my assessment of the patient and the plan of care.   Supervising Physician:  Emilie Harvey MD.

## 2024-08-09 NOTE — PROGRESS NOTES
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 Also has right sided muscle spasm in lumbar area that is tender to palpation. Has been to PT for her sciatica previously, but mostly focused on right shoulder and neck pain. Has also received 4 corticosteroid injections into lumbar back and states that the first one was helpful, but the subsequent injections were not. Intermittently has groin pain, so it is very likely she has arthritic changes in her hip. Could consider more imaging in the future, but did not pursue today as this would not .  She is interested in going to physical therapy specifically for her sciatica -referral placed today. Demonstrated a few stretches today. Also discussed pain management with Tylenol and Voltaren gel. Would avoid oral NSAIDs given her significant GERD. If no improvement with PT, patient will return to clinic and would further discuss pain management referral for possible nerve block.  - Physical Therapy  Referral; Future  - diclofenac (VOLTAREN) 1 % topical gel; Use 4x/day on neck.    Gastroesophageal reflux disease without esophagitis  Did not like omeprazole because it made her feel nauseous. Requested to switch back to pepcid.  - famotidine (PEPCID) 40 MG tablet; Take 1 tablet (40 mg) by mouth 2 times daily    Return if symptoms worsen or fail to improve.    Romeo Blake is a 84 year old, presenting for the following health issues:  Back Pain, Musculoskeletal Problem (Hip and back pain), and Medication Problem (Pt states that the heartburn medication is not working and would like to go back to the previous one. )      8/9/2024     3:15 PM   Additional Questions   Roomed by Desmond   Accompanied by Daughter - Deb         8/9/2024    Information  "   services provided? 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  Physical Exam  Vitals reviewed.   Constitutional:       General: She is not in acute distress.     Appearance: Normal appearance. She is not ill-appearing.   HENT:      Head: Normocephalic and atraumatic.   Eyes:      General: No scleral icterus.     Conjunctiva/sclera: Conjunctivae normal.   Cardiovascular:      Rate and Rhythm: Normal rate.   Pulmonary:      Effort: Pulmonary effort is normal.   Musculoskeletal:      Comments: Large muscle spasm of right paraspinal muscle in thoracic and lumbar areas - tender to palpation  No bony tenderness, no SI joint tenderness     Skin:     General: Skin is warm and dry.   Neurological:      General: No focal deficit present.      Mental Status: She is alert.     Signed Electronically by: Olivier Salinas MD  "

## 2024-11-08 ENCOUNTER — OFFICE VISIT (OUTPATIENT)
Dept: FAMILY MEDICINE | Facility: CLINIC | Age: 84
End: 2024-11-08
Payer: COMMERCIAL

## 2024-11-08 VITALS
HEIGHT: 64 IN | BODY MASS INDEX: 24.82 KG/M2 | OXYGEN SATURATION: 100 % | RESPIRATION RATE: 12 BRPM | HEART RATE: 78 BPM | WEIGHT: 145.4 LBS | DIASTOLIC BLOOD PRESSURE: 79 MMHG | TEMPERATURE: 98.7 F | SYSTOLIC BLOOD PRESSURE: 145 MMHG

## 2024-11-08 DIAGNOSIS — Z23 NEEDS FLU SHOT: ICD-10-CM

## 2024-11-08 DIAGNOSIS — G89.29 CHRONIC NONINTRACTABLE HEADACHE, UNSPECIFIED HEADACHE TYPE: Primary | ICD-10-CM

## 2024-11-08 DIAGNOSIS — R73.03 PREDIABETES: ICD-10-CM

## 2024-11-08 DIAGNOSIS — M35.00 SICCA, UNSPECIFIED TYPE (H): ICD-10-CM

## 2024-11-08 DIAGNOSIS — R03.0 ELEVATED BLOOD PRESSURE READING WITHOUT DIAGNOSIS OF HYPERTENSION: ICD-10-CM

## 2024-11-08 DIAGNOSIS — E78.2 MIXED HYPERLIPIDEMIA: ICD-10-CM

## 2024-11-08 DIAGNOSIS — R40.0 DAYTIME SLEEPINESS: ICD-10-CM

## 2024-11-08 DIAGNOSIS — F33.1 MAJOR DEPRESSIVE DISORDER, RECURRENT EPISODE, MODERATE (H): ICD-10-CM

## 2024-11-08 DIAGNOSIS — E50.7 XEROPHTHALMIA: ICD-10-CM

## 2024-11-08 DIAGNOSIS — R51.9 CHRONIC NONINTRACTABLE HEADACHE, UNSPECIFIED HEADACHE TYPE: Primary | ICD-10-CM

## 2024-11-08 LAB
BASOPHILS # BLD AUTO: 0.1 10E3/UL (ref 0–0.2)
BASOPHILS NFR BLD AUTO: 1 %
EOSINOPHIL # BLD AUTO: 0.4 10E3/UL (ref 0–0.7)
EOSINOPHIL NFR BLD AUTO: 8 %
ERYTHROCYTE [DISTWIDTH] IN BLOOD BY AUTOMATED COUNT: 12.4 % (ref 10–15)
EST. AVERAGE GLUCOSE BLD GHB EST-MCNC: 137 MG/DL
HBA1C MFR BLD: 6.4 % (ref 0–5.6)
HCT VFR BLD AUTO: 38.7 % (ref 35–47)
HGB BLD-MCNC: 12.4 G/DL (ref 11.7–15.7)
IMM GRANULOCYTES # BLD: 0 10E3/UL
IMM GRANULOCYTES NFR BLD: 0 %
LYMPHOCYTES # BLD AUTO: 3.2 10E3/UL (ref 0.8–5.3)
LYMPHOCYTES NFR BLD AUTO: 59 %
MCH RBC QN AUTO: 30.7 PG (ref 26.5–33)
MCHC RBC AUTO-ENTMCNC: 32 G/DL (ref 31.5–36.5)
MCV RBC AUTO: 96 FL (ref 78–100)
MONOCYTES # BLD AUTO: 0.4 10E3/UL (ref 0–1.3)
MONOCYTES NFR BLD AUTO: 8 %
NEUTROPHILS # BLD AUTO: 1.3 10E3/UL (ref 1.6–8.3)
NEUTROPHILS NFR BLD AUTO: 24 %
NRBC # BLD AUTO: 0 10E3/UL
NRBC BLD AUTO-RTO: 0 /100
PLATELET # BLD AUTO: 201 10E3/UL (ref 150–450)
RBC # BLD AUTO: 4.04 10E6/UL (ref 3.8–5.2)
WBC # BLD AUTO: 5.4 10E3/UL (ref 4–11)

## 2024-11-08 RX ORDER — CARBOXYMETHYLCELLULOSE SODIUM 5 MG/ML
1 SOLUTION/ DROPS OPHTHALMIC 3 TIMES DAILY PRN
Qty: 70 EACH | Refills: 11 | Status: SHIPPED | OUTPATIENT
Start: 2024-11-08

## 2024-11-08 ASSESSMENT — PATIENT HEALTH QUESTIONNAIRE - PHQ9
10. IF YOU CHECKED OFF ANY PROBLEMS, HOW DIFFICULT HAVE THESE PROBLEMS MADE IT FOR YOU TO DO YOUR WORK, TAKE CARE OF THINGS AT HOME, OR GET ALONG WITH OTHER PEOPLE: NOT DIFFICULT AT ALL
SUM OF ALL RESPONSES TO PHQ QUESTIONS 1-9: 0
SUM OF ALL RESPONSES TO PHQ QUESTIONS 1-9: 0

## 2024-11-08 NOTE — LETTER
November 13, 2024      Lou Winston  [address]  [facility] [address]        Dear Lou,    Thank you for getting your care at [facility]. Please see below for your test results.    Your blood work shows prediabetes, high cholesterol. Otherwise results are within normal limits.   Resulted Orders   Hemoglobin A1c   Result Value Ref Range    Estimated Average Glucose 137 (H) <117 mg/dL    Hemoglobin A1C 6.4 (H) 0.0 - 5.6 %      Comment:      Normal <5.7%   Prediabetes 5.7-6.4%    Diabetes 6.5% or higher     Note: Adopted from ADA consensus guidelines.   Lipid panel   Result Value Ref Range    Cholesterol 130 <200 mg/dL    Triglycerides 127 <150 mg/dL    Direct Measure HDL 48 (L) >=50 mg/dL    LDL Cholesterol Calculated 57 <100 mg/dL    Non HDL Cholesterol 82 <130 mg/dL    Patient Fasting > 8hrs? Unknown     Narrative    Cholesterol  Desirable: < 200 mg/dL  Borderline High: 200 - 239 mg/dL  High: >= 240 mg/dL    Triglycerides  Normal: < 150 mg/dL  Borderline High: 150 - 199 mg/dL  High: 200-499 mg/dL  Very High: >= 500 mg/dL    Direct Measure HDL  Female: >= 50 mg/dL   Male: >= 40 mg/dL    LDL Cholesterol  Desirable: < 100 mg/dL  Above Desirable: 100 - 129 mg/dL   Borderline High: 130 - 159 mg/dL   High:  160 - 189 mg/dL   Very High: >= 190 mg/dL    Non HDL Cholesterol  Desirable: < 130 mg/dL  Above Desirable: 130 - 159 mg/dL  Borderline High: 160 - 189 mg/dL  High: 190 - 219 mg/dL  Very High: >= 220 mg/dL   Comprehensive metabolic panel   Result Value Ref Range    Sodium 139 135 - 145 mmol/L    Potassium 4.0 3.4 - 5.3 mmol/L    Carbon Dioxide (CO2) 24 22 - 29 mmol/L    Anion Gap 11 7 - 15 mmol/L    Urea Nitrogen 7.8 (L) 8.0 - 23.0 mg/dL    Creatinine 0.61 0.51 - 0.95 mg/dL      Comment:      This is a corrected result. Previous result was 0.08 mg/dL on 11/9/2024 at  4:53 AM CST    GFR Estimate 89 >60 mL/min/1.73m2      Comment:      eGFR calculated using 2021 CKD-EPI equation.   This is a  corrected result. Previous result was >90 mL/min/1.73m2 on 11/9/2024 at  4:53 AM CST    Calcium 9.1 8.8 - 10.4 mg/dL      Comment:      Reference intervals for this test were updated on 7/16/2024 to reflect our healthy population more accurately. There may be differences in the flagging of prior results with similar values performed with this method. Those prior results can be interpreted in the context of the updated reference intervals.    Chloride 104 98 - 107 mmol/L    Glucose 108 (H) 70 - 99 mg/dL    Alkaline Phosphatase 82 40 - 150 U/L    AST 30 0 - 45 U/L    ALT 29 0 - 50 U/L    Protein Total 7.0 6.4 - 8.3 g/dL    Albumin 4.0 3.5 - 5.2 g/dL    Bilirubin Total 0.2 <=1.2 mg/dL    Patient Fasting > 8hrs? Unknown    CBC with platelets and differential   Result Value Ref Range    WBC Count 5.4 4.0 - 11.0 10e3/uL    RBC Count 4.04 3.80 - 5.20 10e6/uL    Hemoglobin 12.4 11.7 - 15.7 g/dL    Hematocrit 38.7 35.0 - 47.0 %    MCV 96 78 - 100 fL    MCH 30.7 26.5 - 33.0 pg    MCHC 32.0 31.5 - 36.5 g/dL    RDW 12.4 10.0 - 15.0 %    Platelet Count 201 150 - 450 10e3/uL    % Neutrophils 24 %    % Lymphocytes 59 %    % Monocytes 8 %    % Eosinophils 8 %    % Basophils 1 %    % Immature Granulocytes 0 %    NRBCs per 100 WBC 0 <1 /100    Absolute Neutrophils 1.3 (L) 1.6 - 8.3 10e3/uL    Absolute Lymphocytes 3.2 0.8 - 5.3 10e3/uL    Absolute Monocytes 0.4 0.0 - 1.3 10e3/uL    Absolute Eosinophils 0.4 0.0 - 0.7 10e3/uL    Absolute Basophils 0.1 0.0 - 0.2 10e3/uL    Absolute Immature Granulocytes 0.0 <=0.4 10e3/uL    Absolute NRBCs 0.0 10e3/uL    Narrative    Tech Comments  Name of Tech: NENO NOEL  Laboratory Phone : [phone]  What is Abnormal : LYMPHOCYTOSIS  Provider Follow Up Needed : NO  If Yes, Provider Contact Name : NO  If Yes, Provider Phone/Pager : NO    *FYI: Dr. Biggs received preliminary results.           Vitamin B12   Result Value Ref Range    Vitamin B12 537 232 - 1,245 pg/mL       Please call the clinic for  a clinic appointment to further reveiw these results.    Sincerely,    Alexa Biggs MD

## 2024-11-08 NOTE — PROGRESS NOTES
Assessment & Plan     Chronic nonintractable headache, unspecified headache type  Patient has ongoing headaches for the past two weeks that start in the temporal region and radiate to her forehead and are associated with ringing/thumping in her ears and some vision changes. Different than her previous tension headaches. Not severe enough for her to take any medications. Differential diagnosis includes migraines, hypertension related headaches, and tension headaches. Most likely due to migraines given sound and vision changes. Unlikely related to HTN as her BP is only mildly elevated. Unlikely caused by tension headaches based on the location of the pain. Recommend patient use acetaminophen for the pain and follow up in 1 month. No red flags indicating imaging is needed at this time.     Elevated blood pressure reading without diagnosis of hypertension  BP elevated at 145/79 today. Reports decreased appetite and headaches for the past two weeks with some vision changes but BP not to hypertensive emergency level. Given that she has no history of hypertension, recommended patient get a BP cuff at home and write down measurements for the next month then follow up to re-evaluate.     Prediabetes  Patient due to for A1c check which was 6.4 today. Encouraged patient to continue lifestyle modifications.   - Hemoglobin A1c    Dry mouth, unspecified type  Patient reports dry mouth and increased thirst which doesn't appear to be related to her diabetes as her A1c is 6.4 today. Also notices diminished taste but no changes in smell. Unclear origin of dry mouth and decreased taste. Will screen for B12 deficiency and check for zinc deficiency at next visit as these can both cause dry mouth and decreased taste. Can also consider ENT referral if symptoms don't resolve.   - Vitamin B12    Major depressive disorder, recurrent episode, moderate (H)  Stable, no concerns today.   - CBC with platelets differential  - Comprehensive  metabolic panel    Mixed hyperlipidemia  Due for labs today.   - Lipid panel    Daytime sleepiness  Due for labs today.   - TSH    Xerophthalmia  Patient reports dry eyes and prefers single dose eye drop containers. Refilled prescription.   - carboxymethylcellulose PF (REFRESH PLUS) 0.5 % ophthalmic solution; Place 1 drop into both eyes 3 times daily as needed for dry eyes.    Needs flu shot  - INFLUENZA HIGH DOSE, TRIVALENT, PF (FLUZONE)      Return in about 4 weeks (around 12/6/2024) for Follow up.    Subjective   Lou is a 84 year old, presenting for the following health issues:  Diabetes (Follow up)      11/8/2024     2:57 PM   Additional Questions   Roomed by garland   Accompanied by daughter         11/8/2024    Information    services provided? No        HPI     BP  - elevated today but not since 9/22/23  - no new stressors, fatigue and not eating as much for the last two weeks  - has also noticed ringing/thumping in ears and having a hard time falling asleep  - small headaches for the past few days, starts at ears and wraps around forehead, makes it hard for her to see, L > R harder to hear   - doesn't feel like its bad enough to take medications but has tylenol at home  - a little similar of headaches  - no abdominal pain, yes heartburn, no constipation/diarrhea    Diabetes  - A1c today is 6.4 which is unchanged from previous  - continue with the good work    Dry mouth and lips  - mouth is very dry  - feeling thirst all the time  - also notices dry eyes, prefers old eye drops compared to new ones that make her eyes burn, liked the ones that were single doses which looks like it was the prescription from 2023        Review of Systems  Constitutional, HEENT, cardiovascular, pulmonary, gi and gu systems are negative, except as otherwise noted.    Answers submitted by the patient for this visit:  Patient Health Questionnaire (Submitted on 11/8/2024)  If you checked off any problems, how  "difficult have these problems made it for you to do your work, take care of things at home, or get along with other people?: Not difficult at all  PHQ9 TOTAL SCORE: 0      Objective    BP (!) 145/79   Pulse 78   Temp 98.7  F (37.1  C) (Oral)   Resp 12   Ht 1.626 m (5' 4\")   Wt 66 kg (145 lb 6.4 oz)   SpO2 100%   BMI 24.96 kg/m    Body mass index is 24.96 kg/m .  Physical Exam  Constitutional:       Appearance: Normal appearance.   HENT:      Head: Normocephalic and atraumatic.      Right Ear: External ear normal.      Left Ear: External ear normal.   Eyes:      Extraocular Movements: Extraocular movements intact.      Conjunctiva/sclera: Conjunctivae normal.   Cardiovascular:      Rate and Rhythm: Normal rate.   Pulmonary:      Effort: Pulmonary effort is normal.   Musculoskeletal:      Cervical back: Normal range of motion.   Neurological:      General: No focal deficit present.      Mental Status: She is alert.   Psychiatric:         Mood and Affect: Mood normal.         Thought Content: Thought content normal.       Results for orders placed or performed in visit on 11/08/24 (from the past 24 hours)   Hemoglobin A1c   Result Value Ref Range    Estimated Average Glucose 137 (H) <117 mg/dL    Hemoglobin A1C 6.4 (H) 0.0 - 5.6 %   CBC with platelets differential    Narrative    The following orders were created for panel order CBC with platelets differential.  Procedure                               Abnormality         Status                     ---------                               -----------         ------                     CBC with platelets and d...[[id number]]                      In process                   Please view results for these tests on the individual orders.       Shamika Rainey, MS3  [employer]  [employer]  November 8, 2024 3:38 PM       Signed Electronically by: Alexa Biggs MD    "

## 2024-11-09 LAB
CHOLEST SERPL-MCNC: 130 MG/DL
FASTING STATUS PATIENT QL REPORTED: ABNORMAL
HDLC SERPL-MCNC: 48 MG/DL
LDLC SERPL CALC-MCNC: 57 MG/DL
NONHDLC SERPL-MCNC: 82 MG/DL
TRIGL SERPL-MCNC: 127 MG/DL
TSH SERPL DL<=0.005 MIU/L-ACNC: 0.62 UIU/ML (ref 0.3–4.2)
VIT B12 SERPL-MCNC: 537 PG/ML (ref 232–1245)

## 2024-11-12 LAB
ALBUMIN SERPL BCG-MCNC: 4 G/DL (ref 3.5–5.2)
ALP SERPL-CCNC: 82 U/L (ref 40–150)
ALT SERPL W P-5'-P-CCNC: 29 U/L (ref 0–50)
ANION GAP SERPL CALCULATED.3IONS-SCNC: 11 MMOL/L (ref 7–15)
AST SERPL W P-5'-P-CCNC: 30 U/L (ref 0–45)
BILIRUB SERPL-MCNC: 0.2 MG/DL
BUN SERPL-MCNC: 7.8 MG/DL (ref 8–23)
CALCIUM SERPL-MCNC: 9.1 MG/DL (ref 8.8–10.4)
CHLORIDE SERPL-SCNC: 104 MMOL/L (ref 98–107)
CREAT SERPL-MCNC: 0.61 MG/DL (ref 0.51–0.95)
EGFRCR SERPLBLD CKD-EPI 2021: 89 ML/MIN/1.73M2
FASTING STATUS PATIENT QL REPORTED: ABNORMAL
GLUCOSE SERPL-MCNC: 108 MG/DL (ref 70–99)
HCO3 SERPL-SCNC: 24 MMOL/L (ref 22–29)
POTASSIUM SERPL-SCNC: 4 MMOL/L (ref 3.4–5.3)
PROT SERPL-MCNC: 7 G/DL (ref 6.4–8.3)
SODIUM SERPL-SCNC: 139 MMOL/L (ref 135–145)

## 2025-01-03 ENCOUNTER — TRANSFERRED RECORDS (OUTPATIENT)
Dept: MULTI SPECIALTY CLINIC | Facility: CLINIC | Age: 85
End: 2025-01-03

## 2025-01-03 LAB — RETINOPATHY: NORMAL

## 2025-01-10 ENCOUNTER — OFFICE VISIT (OUTPATIENT)
Dept: FAMILY MEDICINE | Facility: CLINIC | Age: 85
End: 2025-01-10
Payer: COMMERCIAL

## 2025-01-10 VITALS
RESPIRATION RATE: 14 BRPM | SYSTOLIC BLOOD PRESSURE: 119 MMHG | HEART RATE: 78 BPM | WEIGHT: 146.6 LBS | BODY MASS INDEX: 26.98 KG/M2 | TEMPERATURE: 97.8 F | DIASTOLIC BLOOD PRESSURE: 75 MMHG | HEIGHT: 62 IN | OXYGEN SATURATION: 97 %

## 2025-01-10 DIAGNOSIS — G89.29 CHRONIC LOW BACK PAIN WITH RIGHT-SIDED SCIATICA, UNSPECIFIED BACK PAIN LATERALITY: ICD-10-CM

## 2025-01-10 DIAGNOSIS — R03.0 ELEVATED BLOOD PRESSURE READING WITHOUT DIAGNOSIS OF HYPERTENSION: Primary | ICD-10-CM

## 2025-01-10 DIAGNOSIS — M54.41 CHRONIC LOW BACK PAIN WITH RIGHT-SIDED SCIATICA, UNSPECIFIED BACK PAIN LATERALITY: ICD-10-CM

## 2025-01-10 DIAGNOSIS — R73.03 PREDIABETES: ICD-10-CM

## 2025-01-10 DIAGNOSIS — K21.9 GASTROESOPHAGEAL REFLUX DISEASE WITHOUT ESOPHAGITIS: ICD-10-CM

## 2025-01-10 RX ORDER — FAMOTIDINE 40 MG/1
40 TABLET, FILM COATED ORAL 2 TIMES DAILY PRN
Qty: 240 TABLET | Refills: 1 | Status: SHIPPED | OUTPATIENT
Start: 2025-01-10

## 2025-01-10 RX ORDER — ACETAMINOPHEN 500 MG
1000 TABLET ORAL EVERY 8 HOURS PRN
Qty: 100 TABLET | Refills: 11 | Status: SHIPPED | OUTPATIENT
Start: 2025-01-10

## 2025-01-10 NOTE — PATIENT INSTRUCTIONS
Patient Education   Here is the plan from today's visit    1. Elevated blood pressure reading without diagnosis of hypertension (Primary)  - Home Blood Pressure Monitor Order for DME - ONLY FOR DME    2. Chronic low back pain with right-sided sciatica, unspecified back pain laterality  - acetaminophen (TYLENOL) 500 MG tablet; Take 2 tablets (1,000 mg) by mouth every 8 hours as needed for mild pain.  Dispense: 100 tablet; Refill: 11    Physical therapy   If you don't hear from a representative within 2 business days, please call [phone].       3. Gastroesophageal reflux disease without esophagitis  - famotidine (PEPCID) 40 MG tablet; Take 1 tablet (40 mg) by mouth 2 times daily as needed for heartburn.  Dispense: 240 tablet; Refill: 1    4. Prediabetes         Please call or return to clinic if your symptoms don't go away.    Follow up plan  Return for Follow up 1-2 month .    Thank you for coming to [facility] today.  Lab Testing:  **If you had lab testing today and your results are reassuring or normal they will be mailed to you or sent through [employer] within 7 days.   **If the lab tests need quick action we will call you with the results.  **If you are having labs done on a different day, please call [phone] to schedule at [facility] or [phone] for other [facility] locations. Labs do not offer walk-in appointments.  The phone number we will call with results is # [phone] (home) [phone] (work). If this is not the best number please call our clinic and change the number.  Medication Refills:  If you need any refills please call your pharmacy and they will contact us.   If you need to  your refill at a new pharmacy, please contact the new pharmacy directly. The new pharmacy will help you get your medications transferred faster.   Scheduling:  If you have any concerns about today's visit or wish to schedule another appointment please call our office during  normal business hours [phone] (8-5:00 M-F). If you can no longer make a scheduled visit, please cancel via [employer] or call us to cancel.   If a referral was made to an [facility] specialty provider and you do not get a call from central scheduling, please refer to directions on your visit summary or call our office during normal business hours for assistance.   If a Mammogram was ordered for you at [facility] call [phone] to schedule or change your appointment.  If you had an XRay/CT/Ultrasound/MRI ordered the number is [phone] to schedule or change your radiology appointment.   [facility] has limited ultrasound appointments available on Wednesdays, if you would like your ultrasound at [facility], please call [phone] to schedule.   Medical Concerns:  If you have urgent medical concerns please call [phone] at any time of the day.    Augusto Kaminski MD    DME order for Home Blood Pressure Monitor successfully sent to [facility] ([phone]).

## 2025-01-10 NOTE — PROGRESS NOTES
"  Assessment & Plan     Elevated blood pressure reading without diagnosis of hypertension  Elevated blood pressure at previous visit. Was told to monitor blood pressure at home but did not have a blood pressure monitor. /75 today. Asymptomatic. Recommend monitor blood pressure at home. BP monitor ordered.   - Home Blood Pressure Monitor Order for DME - ONLY FOR DME    Prediabetes  Most recent A1c stable at 6.4%. Encouraged patient to continue lifestyle modifications.     Chronic low back pain with right-sided sciatica, unspecified back pain laterality  PT referral given last visit. Did not hear anything about scheduling an appt. Scheduling phone number given with AVS. Refills given.   - acetaminophen (TYLENOL) 500 MG tablet  Dispense: 100 tablet; Refill: 11    Gastroesophageal reflux disease without esophagitis  Refills given.   - famotidine (PEPCID) 40 MG tablet  Dispense: 240 tablet; Refill: 1      BMI  Estimated body mass index is 26.81 kg/m  as calculated from the following:    Height as of this encounter: 1.575 m (5' 2\").    Weight as of this encounter: 66.5 kg (146 lb 9.6 oz).       Return for Follow up 1-2 month .    Romeo Blake is a 85 year old, presenting for the following health issues:  Follow Up (DM) and Pain (Back pain and Right leg )        1/10/2025     8:37 AM   Additional Questions   Roomed by Ian         1/10/2025    Information    services provided? Yes   Language Oromo   Type of interpretation provided Face-to-face    name Lynette Cobb     HPI       Prediabtes   11/8: 6.4  Dieting, home cook meals, no sugary beverages   Tyring to excerise but has not gone much due to cold weather    Elevated BP:   BP at home, unsure   Did not get a blood pressure cuff   119/75 today  No symptoms       Low back pain:  Chronic   Competed Injection 4x and PT upper back in the past   last visit PT given, but has not schedule, need phone number   Voltaren gel- helps, no " "refills needed         Review of Systems  Constitutional, HEENT, cardiovascular, pulmonary, gi and gu systems are negative, except as otherwise noted.      Objective    /75   Pulse 78   Temp 97.8  F (36.6  C) (Temporal)   Resp 14   Ht 1.575 m (5' 2\")   Wt 66.5 kg (146 lb 9.6 oz)   SpO2 97%   BMI 26.81 kg/m    Body mass index is 26.81 kg/m .  Physical Exam   GENERAL: alert and no distress  RESP: lungs clear to auscultation - no rales, rhonchi or wheezes  CV: regular rate and rhythm, normal S1 S2, no S3 or S4, no murmur, click or rub, no peripheral edema  ABDOMEN: soft, nontender, no hepatosplenomegaly, no masses and bowel sounds normal  MS: Tenderness to palpation along lumbar area (RT>>LT)   NEURO: mentation intact and speech normal  PSYCH: mentation appears normal, affect normal/bright          Signed Electronically by: Augusto Kaminski MD    "

## 2025-04-11 NOTE — MR AVS SNAPSHOT
After Visit Summary   10/31/2017    Lou Winston    MRN: [id number]           Patient Information     Date Of Birth          1940        Visit Information        Provider Department      10/31/2017 3:40 PM Phuong Palma MD [facility]        Today's Diagnoses     Tinnitus of left ear    -  1      Care Instructions    Here is the plan from today's visit    1. Tinnitus of left ear  - OTOLARYNGOLOGY REFERRAL - INTERNAL    Thank you for coming to [facility] today.  Lab Testing:  **If you had lab testing today and your results are reassuring or normal they will be mailed to you or sent through [employer] within 7 days.   **If the lab tests need quick action we will call you with the results.  The phone number we will call with results is # [phone] (home) . If this is not the best number please call our clinic and change the number.  Medication Refills:  If you need any refills please call your pharmacy and they will contact us.   If you need to  your refill at a new pharmacy, please contact the new pharmacy directly. The new pharmacy will help you get your medications transferred faster.   Scheduling:  If you have any concerns about today's visit or wish to schedule another appointment please call our office during normal business hours [phone] (8-5:00 M-F)  If a referral was made to a [facility] and you don't get a call from central scheduling please call [phone].  If a Mammogram was ordered for you at [facility] call [phone] to schedule or change your appointment.  If you had an XRay/CT/Ultrasound/MRI ordered the number is [phone] to schedule or change your radiology appointment.   Medical Concerns:  If you have urgent medical concerns please call [phone] at any time of the day.            Follow-ups after your visit        Additional Services     OTOLARYNGOLOGY REFERRAL - INTERNAL       Your provider  has referred you to: [facility]: [facility] (Otolaryngology) - [place] [phone]  [email or web address]    Please be aware that coverage of these services is subject to the terms and limitations of your health insurance plan.  Call member services at your health plan with any benefit or coverage questions.      Please bring the following with you to your appointment:    (1) Any X-Rays, CTs or MRIs which have been performed.  Contact the facility where they were done to arrange for  prior to your scheduled appointment.   (2) List of current medications  (3) This referral request   (4) Any documents/labs given to you for this referral                  Who to contact     Please call your clinic at [phone] to:    Ask questions about your health    Make or cancel appointments    Discuss your medicines    Learn about your test results    Speak to your doctor   If you have compliments or concerns about an experience at your clinic, or if you wish to file a complaint, please contact [facility] Patient Relations at [phone] or email us at [email or web address]         Additional Information About Your Visit        [employer] Information     [employer] is an electronic gateway that provides easy, online access to your medical records. With [employer], you can request a clinic appointment, read your test results, renew a prescription or communicate with your care team.     To sign up for [employer] visit the website at [email or web address]   You will be asked to enter the access code listed below, as well as some personal information. Please follow the directions to create your username and password.     Your access code is: [email or web address]  Expires: 2018  5:05 PM     Your access code will  in 90 days. If you need help or a new code, please contact your [facility] or call  [phone] for assistance.        Care EveryWhere ID     This is your Care EveryWhere ID. This could be used by other organizations to access your [facility] medical records  [id number]        Your Vitals Were     Pulse Temperature Respirations Pulse Oximetry BMI (Body Mass Index)       82 98  F (36.7  C) (Oral) 20 98% 27.78 kg/m2        Blood Pressure from Last 3 Encounters:   10/31/17 138/83   10/23/17 134/81   09/29/17 117/79    Weight from Last 3 Encounters:   10/31/17 147 lb (66.7 kg)   10/23/17 147 lb 12.8 oz (67 kg)   09/29/17 146 lb 6.4 oz (66.4 kg)              We Performed the Following     OTOLARYNGOLOGY REFERRAL - INTERNAL        Primary Care Provider Office Phone # Fax #    Herminia Esteban [phone]       [facility] [address] [place] [address]        Equal Access to Services     MAHI LYNCH : Hadii aad ku hadasho Soomaali, waaxda luqadaha, qaybta kaalmada adeegyada, waxay idiin haydawnan saul gonzalez . So [facility] [phone].    ATENCIÓN: Si habla español, tiene a castano disposición servicios gratuitos de asistencia lingüística. Llame al [phone].    We comply with applicable federal civil rights laws and [place] laws. We do not discriminate on the basis of race, color, national origin, age, disability, sex, sexual orientation, or gender identity.            Thank you!     Thank you for choosing [facility]  for your care. Our goal is always to provide you with excellent care. Hearing back from our patients is one way we can continue to improve our services. Please take a few minutes to complete the written survey that you may receive in the mail after your visit with us. Thank you!             Your Updated Medication List - Protect others around you: Learn how to safely use, store and throw away your medicines at www.disposemymeds.org.          This list is accurate as of: 10/31/17  5:05 PM.  Always use your most recent  med list.                   Brand Name Dispense Instructions for use Diagnosis    acetaminophen 500 MG tablet    TYLENOL    100 tablet    Take 2 tablets (1,000 mg) by mouth every 8 hours as needed for mild pain    Chronic lumbar radiculopathy       carbamide peroxide 6.5 % otic solution    DEBROX    30 mL    Place 5-10 drops into both ears 2 times daily    Bilateral impacted cerumen       carboxymethylcellulose 0.5 % Soln ophthalmic solution    REFRESH PLUS    1 Bottle    Place 1 drop into both eyes 3 times daily as needed for dry eyes    Dry eyes       cholecalciferol 1000 UNIT tablet    vitamin D3    100 tablet    Take 1 tablet (1,000 Units) by mouth daily    Vitamin D deficiency       diclofenac 1 % Gel topical gel    VOLTAREN    100 g    Apply 4 grams to lower back four times daily using enclosed dosing card.    Chronic lumbar radiculopathy       DULoxetine 60 MG EC capsule    CYMBALTA    120 capsule    Take 1 capsule (60 mg) by mouth 2 times daily    Major depressive disorder, recurrent episode, moderate (H), Chronic lumbar radiculopathy       gabapentin 100 MG capsule    NEURONTIN    180 capsule    TAKE TWO CAPSULES BY MOUTH AT BEDTIME    Chronic pain syndrome       ketotifen 0.025 % Soln ophthalmic solution    KP KETOTIFEN FUMARATE    1 Bottle    Place 1 drop into both eyes 2 times daily    Seasonal allergic conjunctivitis       loperamide 2 MG tablet    IMODIUM A-D    30 tablet    Take 2 tabs (4 mg) after first loose stool, and then take one tab (2 mg) after each diarrheal stool.  Max of 8 tabs (16 mg) per day.    Viral gastroenteritis       pantoprazole 40 MG EC tablet    PROTONIX    30 tablet    TAKE ONE TABLET BY MOUTH EVERY DAY 30-60 MINUTES BEFORE A MEAL    Gastroesophageal reflux disease without esophagitis       UNKNOWN TO PATIENT       Lumbago          present

## 2025-05-07 ENCOUNTER — RESULTS FOLLOW-UP (OUTPATIENT)
Dept: FAMILY MEDICINE | Facility: CLINIC | Age: 85
End: 2025-05-07
Payer: COMMERCIAL

## 2025-06-17 ENCOUNTER — OFFICE VISIT (OUTPATIENT)
Dept: FAMILY MEDICINE | Facility: CLINIC | Age: 85
End: 2025-06-17
Payer: COMMERCIAL

## 2025-06-17 VITALS
TEMPERATURE: 98.2 F | SYSTOLIC BLOOD PRESSURE: 145 MMHG | RESPIRATION RATE: 14 BRPM | HEART RATE: 89 BPM | WEIGHT: 144.7 LBS | DIASTOLIC BLOOD PRESSURE: 85 MMHG | BODY MASS INDEX: 24.7 KG/M2 | HEIGHT: 64 IN | OXYGEN SATURATION: 96 %

## 2025-06-17 DIAGNOSIS — R03.0 ELEVATED BLOOD PRESSURE READING WITHOUT DIAGNOSIS OF HYPERTENSION: Primary | ICD-10-CM

## 2025-06-17 DIAGNOSIS — E11.9 TYPE 2 DIABETES MELLITUS WITHOUT COMPLICATION, WITHOUT LONG-TERM CURRENT USE OF INSULIN (H): ICD-10-CM

## 2025-06-17 PROCEDURE — 3079F DIAST BP 80-89 MM HG: CPT

## 2025-06-17 PROCEDURE — 99214 OFFICE O/P EST MOD 30 MIN: CPT | Mod: GC

## 2025-06-17 PROCEDURE — 3077F SYST BP >= 140 MM HG: CPT

## 2025-06-17 NOTE — PROGRESS NOTES
"  Assessment & Plan     Elevated blood pressure reading without diagnosis of hypertension  Patient reports SBP yesterday to be around 130's. She has not been checking her BP consistently and denies any symptoms. Discuss with patient my concern for hypertension and the consideration to starting a blood pressure medication. She will like to monitor her blood pressure more closely to see if her numbers are better at home. Recommend checking her blood pressure at home daily, around the the same time each day, and during times without physical exertion. She is to write those blood pressure into a notebook and follow up in one week for BP check.     Type 2 diabetes mellitus without complication, without long-term current use of insulin (H)  Most recent A1c elevated at 6.6. Patient was recently stable at 6.4 but now meets the criteria for diabetes. Discuss consideration for starting metformin to help improve her BG levels. Patient would like to continue life style changes with dieting and exercising. Provided education material for dieting, metformin, and diabetes. Plan to follow up A1c in 3 months.       Romeo Blake is a 85 year old, presenting for the following health issues:  Follow Up        6/17/2025     4:28 PM   Additional Questions   Roomed by Ian         6/17/2025    Information    services provided? Yes   Language Oromo   Type of interpretation provided Face-to-face    name Lynette     FLOWER        Follow up:     Elevated BP   145/85  Home BP around 130 yesterday   Does not check consistently   No symptoms       Diabetes:   6.6 last visit   No abdominal, increase thirst, urinary freq or urgency,  nausea or vominting   No confusion          Review of Systems  Constitutional, HEENT, cardiovascular, pulmonary, gi and gu systems are negative, except as otherwise noted.      Objective    BP (!) 145/85   Pulse 89   Temp 98.2  F (36.8  C) (Temporal)   Resp 14   Ht 1.626 m (5' 4\")  "  Wt 65.6 kg (144 lb 11.2 oz)   SpO2 96%   BMI 24.84 kg/m    Body mass index is 24.84 kg/m .  Physical Exam   GENERAL: alert and no distress  RESP: lungs clear to auscultation - no rales, rhonchi or wheezes  CV: regular rate and rhythm, normal S1 S2, no S3 or S4, no murmur, click or rub, no peripheral edema  ABDOMEN: soft, nontender, no hepatosplenomegaly, no masses and bowel sounds normal  NEURO:  mentation intact and speech normal  PSYCH: mentation appears normal, affect normal/bright    Office Visit on 05/06/2025   Component Date Value Ref Range Status    Estimated Average Glucose 05/06/2025 143 (H)  <117 mg/dL Final    Hemoglobin A1C 05/06/2025 6.6 (H)  0.0 - 5.6 % Final    Normal <5.7%   Prediabetes 5.7-6.4%    Diabetes 6.5% or higher     Note: Adopted from ADA consensus guidelines.           Signed Electronically by: Augusto Kaminski MD

## 2025-06-17 NOTE — PATIENT INSTRUCTIONS
Patient Education   Here is the plan from today's visit    1. Elevated blood pressure reading without diagnosis of hypertension (Primary)  Continue to monitor blood pressure at home. Follow up with reading in 1-2 weeks.     2. Type 2 diabetes mellitus without complication, without long-term current use of insulin (H)  Try life style modifications with dieting and exercising. Will plan to repeat labs in 3 months.         Please call or return to clinic if your symptoms don't go away.    Follow up plan  No follow-ups on file.    Thank you for coming to [facility] today.  Lab Testing:  **If you had lab testing today and your results are reassuring or normal they will be mailed to you or sent through [facility] within 7 days.   **If the lab tests need quick action we will call you with the results.  **If you are having labs done on a different day, please call [phone] to schedule at [facility] or [phone] for other [facility] Outpatient Lab locations. Labs do not offer walk-in appointments.  The phone number we will call with results is # [phone] (home) [phone] (work). If this is not the best number please call our clinic and change the number.  Medication Refills:  If you need any refills please call your pharmacy and they will contact us.   If you need to  your refill at a new pharmacy, please contact the new pharmacy directly. The new pharmacy will help you get your medications transferred faster.   Scheduling:  If you have any concerns about today's visit or wish to schedule another appointment please call our office during normal business hours [phone] (8-5:00 M-F). If you can no longer make a scheduled visit, please cancel via [facility] or call us to cancel.   If a referral was made to an [facility] specialty provider and you do not get a call from central scheduling, please refer to directions on your visit summary or call our office during normal business hours for  assistance.   If a Mammogram was ordered for you at the [facility] call [phone] to schedule or change your appointment.  If you had an XRay/CT/Ultrasound/MRI ordered the number is [phone] to schedule or change your radiology appointment.   [facility] has limited ultrasound appointments available on Wednesdays, if you would like your ultrasound at [facility], please call [phone] to schedule.   Medical Concerns:  If you have urgent medical concerns please call [phone] at any time of the day.    Augusto Kaminski MD

## 2025-07-03 ENCOUNTER — OFFICE VISIT (OUTPATIENT)
Dept: FAMILY MEDICINE | Facility: CLINIC | Age: 85
End: 2025-07-03
Payer: COMMERCIAL

## 2025-07-03 VITALS
TEMPERATURE: 97.8 F | SYSTOLIC BLOOD PRESSURE: 132 MMHG | WEIGHT: 141.5 LBS | RESPIRATION RATE: 16 BRPM | HEIGHT: 64 IN | HEART RATE: 85 BPM | DIASTOLIC BLOOD PRESSURE: 83 MMHG | BODY MASS INDEX: 24.16 KG/M2 | OXYGEN SATURATION: 99 %

## 2025-07-03 DIAGNOSIS — G89.29 CHRONIC BILATERAL LOW BACK PAIN WITHOUT SCIATICA: ICD-10-CM

## 2025-07-03 DIAGNOSIS — R03.0 ELEVATED BLOOD PRESSURE READING WITHOUT DIAGNOSIS OF HYPERTENSION: Primary | ICD-10-CM

## 2025-07-03 DIAGNOSIS — F33.1 MAJOR DEPRESSIVE DISORDER, RECURRENT EPISODE, MODERATE (H): ICD-10-CM

## 2025-07-03 DIAGNOSIS — M54.50 CHRONIC BILATERAL LOW BACK PAIN WITHOUT SCIATICA: ICD-10-CM

## 2025-07-03 DIAGNOSIS — E11.9 TYPE 2 DIABETES MELLITUS WITHOUT COMPLICATION, WITHOUT LONG-TERM CURRENT USE OF INSULIN (H): ICD-10-CM

## 2025-07-03 PROCEDURE — 99213 OFFICE O/P EST LOW 20 MIN: CPT | Mod: GC

## 2025-07-03 PROCEDURE — 1125F AMNT PAIN NOTED PAIN PRSNT: CPT

## 2025-07-03 PROCEDURE — 3079F DIAST BP 80-89 MM HG: CPT

## 2025-07-03 PROCEDURE — 3075F SYST BP GE 130 - 139MM HG: CPT

## 2025-07-03 ASSESSMENT — PATIENT HEALTH QUESTIONNAIRE - PHQ9
SUM OF ALL RESPONSES TO PHQ QUESTIONS 1-9: 8
10. IF YOU CHECKED OFF ANY PROBLEMS, HOW DIFFICULT HAVE THESE PROBLEMS MADE IT FOR YOU TO DO YOUR WORK, TAKE CARE OF THINGS AT HOME, OR GET ALONG WITH OTHER PEOPLE: SOMEWHAT DIFFICULT
SUM OF ALL RESPONSES TO PHQ QUESTIONS 1-9: 8

## 2025-07-03 ASSESSMENT — PAIN SCALES - GENERAL: PAINLEVEL_OUTOF10: SEVERE PAIN (8)

## 2025-07-03 NOTE — PROGRESS NOTES
Assessment & Plan     Elevated blood pressure reading without diagnosis of hypertension  Elevated blood pressure at previous visits. /83 today. Patient tracked BP daily for the past 2 weeks. Notable for /85 on 6/19 and 157/82 on 6/16. Otherwise BP readings have been at goal. Discussed continuing to monitor blood pressure at home every 1-2x per week. Continue to exercise daily and dash diet.     Chronic bilateral low back pain without sciatica  PT referral given last year. Patient said that she did not go because it was winter. Placed new PT referral and advised patient to call and schedule with phone number in AVS. Patient declined need for any medication refills.   - Physical Therapy  Referral    Type 2 Diabetes  Most recent A1c 6.6. Discussed continued lifestyle management (daily exercise, no white bread). Follow up A1c in 2 months.       Major depressive disorder  PHQ9 score of 8. Reports mood has been stable. Patient denies any concerns. Denies any thoughts of hurting self or plans to hurt herself. Denies any assistance needed at this time.       Follow-up  Follow up A1c in 2 months. If elevated from 6.6, discuss metformin.     Romeo Blake is a 85 year old, presenting for the following health issues:  RECHECK (Diabetes - foot exam /Low back pain and runs down her right leg)        7/3/2025    10:14 AM   Additional Questions   Roomed by Pearl   Accompanied by kai         7/3/2025    Information    services provided? Yes   Language Eritrean   Type of interpretation provided Face-to-face    name Johan Brown    Agency [employer]     HPI        Elevated BP   Home BP- bought a home list   Did you get BP cuff- yes  Check BP randomly throughout the day 120-145 for two weeks   Dieting and exercise daily - 2-3 block walking  Low salt  No smoking or alcohol     Chronic low back pain  PT given in the past- no too cold at that time  Want PT referral  "      Diabetes:   Doing daily walks and working on better diet.       Review of Systems  Constitutional, HEENT, cardiovascular, pulmonary, gi and gu systems are negative, except as otherwise noted.      Objective    /83   Pulse 85   Temp 97.8  F (36.6  C) (Skin)   Resp 16   Ht 1.626 m (5' 4\")   Wt 64.2 kg (141 lb 8 oz)   SpO2 99%   BMI 24.29 kg/m    Body mass index is 24.29 kg/m .  Physical Exam  Vitals reviewed.   Constitutional:       General: She is not in acute distress.     Appearance: Normal appearance.   Cardiovascular:      Rate and Rhythm: Normal rate and regular rhythm.      Pulses: Normal pulses.   Pulmonary:      Effort: Pulmonary effort is normal.      Breath sounds: Normal breath sounds.   Musculoskeletal:         General: No swelling or tenderness.      Comments: Unable to reproduce lumbar back pain.    Feet:      Right foot:      Skin integrity: Skin integrity normal.      Left foot:      Skin integrity: Skin integrity normal.   Neurological:      Mental Status: She is alert. Mental status is at baseline.          Patient's self recorded blood pressures from 2025-2025 were within normal limits with the exception of the followin2025 - 145/85  2025 - 157/82  2025 - 139/75        Mya Ram, MS3    I was present with the medical student who participated in the service and in the documentation of this note. I have verified the history and personally performed the physical exam and medical decision making, and have verified the content of the note, which accurately reflects my assessment of the patient and the plan of care.   Augusto Kaminski MD     Signed Electronically by: Augusto Kaminski MD    Answers submitted by the patient for this visit:  Patient Health Questionnaire (Submitted on 7/3/2025)  If you checked off any problems, how difficult have these problems made it for you to do your work, take care of things at home, or get along with other " people?: Somewhat difficult  PHQ9 TOTAL SCORE: 8

## 2025-07-03 NOTE — PROGRESS NOTES
Preceptor Attestation:    I discussed the patient with the resident and evaluated the patient in person. I have verified the content of the note, which accurately reflects my assessment of the patient and the plan of care.   Supervising Physician:  Adelfo Boss MD, MD.

## 2025-07-03 NOTE — PATIENT INSTRUCTIONS
Patient Education   Here is the plan from today's visit    1. Elevated blood pressure reading without diagnosis of hypertension (Primary)  Continue to monitor blood pressure at home.    2. Chronic bilateral low back pain without sciatica  - Physical Therapy  Referral; Future    3. Type 2 diabetes  Follow up in 2 months for repeat labs      4. Major depressive disorder  Please let us know if you need any additional assistance or resources           Please call or return to clinic if your symptoms don't go away.    Follow up plan  No follow-ups on file.    Thank you for coming to [facility] today.  Lab Testing:  **If you had lab testing today and your results are reassuring or normal they will be mailed to you or sent through [employer] within 7 days.   **If the lab tests need quick action we will call you with the results.  **If you are having labs done on a different day, please call [phone] to schedule at [facility] or [phone] for other [facility] Outpatient Lab locations. Labs do not offer walk-in appointments.  The phone number we will call with results is # [phone] (home) [phone] (work). If this is not the best number please call our clinic and change the number.  Medication Refills:  If you need any refills please call your pharmacy and they will contact us.   If you need to  your refill at a new pharmacy, please contact the new pharmacy directly. The new pharmacy will help you get your medications transferred faster.   Scheduling:  If you have any concerns about today's visit or wish to schedule another appointment please call our office during normal business hours [phone] (8-5:00 M-F). If you can no longer make a scheduled visit, please cancel via [employer] or call us to cancel.   If a referral was made to an [facility] specialty provider and you do not get a call from central scheduling, please refer to directions on your visit summary or call our  office during normal business hours for assistance.   If a Mammogram was ordered for you at the [facility] call [phone] to schedule or change your appointment.  If you had an XRay/CT/Ultrasound/MRI ordered the number is [phone] to schedule or change your radiology appointment.   [facility] has limited ultrasound appointments available on Wednesdays, if you would like your ultrasound at [facility], please call [phone] to schedule.   Medical Concerns:  If you have urgent medical concerns please call [phone] at any time of the day.    Augusto Kaminski MD

## 2025-08-06 ENCOUNTER — THERAPY VISIT (OUTPATIENT)
Dept: PHYSICAL THERAPY | Facility: CLINIC | Age: 85
End: 2025-08-06
Attending: FAMILY MEDICINE
Payer: COMMERCIAL

## 2025-08-06 DIAGNOSIS — M54.50 CHRONIC BILATERAL LOW BACK PAIN WITHOUT SCIATICA: Primary | ICD-10-CM

## 2025-08-06 DIAGNOSIS — G89.29 CHRONIC BILATERAL LOW BACK PAIN WITHOUT SCIATICA: Primary | ICD-10-CM

## 2025-08-06 PROCEDURE — T1013 SIGN LANG/ORAL INTERPRETER: HCPCS

## 2025-08-06 PROCEDURE — 97530 THERAPEUTIC ACTIVITIES: CPT | Mod: GP

## 2025-08-06 PROCEDURE — 97162 PT EVAL MOD COMPLEX 30 MIN: CPT | Mod: GP

## 2025-08-06 PROCEDURE — 97112 NEUROMUSCULAR REEDUCATION: CPT | Mod: GP

## 2025-08-13 ENCOUNTER — THERAPY VISIT (OUTPATIENT)
Dept: PHYSICAL THERAPY | Facility: CLINIC | Age: 85
End: 2025-08-13
Attending: FAMILY MEDICINE
Payer: COMMERCIAL

## 2025-08-13 DIAGNOSIS — M54.50 CHRONIC BILATERAL LOW BACK PAIN WITHOUT SCIATICA: Primary | ICD-10-CM

## 2025-08-13 DIAGNOSIS — G89.29 CHRONIC BILATERAL LOW BACK PAIN WITHOUT SCIATICA: Primary | ICD-10-CM

## 2025-08-13 PROCEDURE — 97112 NEUROMUSCULAR REEDUCATION: CPT | Mod: GP

## (undated) RX ORDER — REGADENOSON 0.08 MG/ML
INJECTION, SOLUTION INTRAVENOUS
Status: DISPENSED
Start: 2021-03-09